# Patient Record
Sex: FEMALE | Race: WHITE | Employment: OTHER | URBAN - METROPOLITAN AREA
[De-identification: names, ages, dates, MRNs, and addresses within clinical notes are randomized per-mention and may not be internally consistent; named-entity substitution may affect disease eponyms.]

---

## 2017-07-12 ENCOUNTER — TELEPHONE (OUTPATIENT)
Dept: ORTHOPEDIC SURGERY | Age: 77
End: 2017-07-12

## 2017-07-12 ENCOUNTER — OFFICE VISIT (OUTPATIENT)
Dept: ORTHOPEDIC SURGERY | Age: 77
End: 2017-07-12

## 2017-07-12 VITALS
DIASTOLIC BLOOD PRESSURE: 63 MMHG | SYSTOLIC BLOOD PRESSURE: 109 MMHG | BODY MASS INDEX: 24.84 KG/M2 | HEIGHT: 62 IN | HEART RATE: 77 BPM | RESPIRATION RATE: 16 BRPM | WEIGHT: 135 LBS

## 2017-07-12 DIAGNOSIS — R22.31 MASS OF RIGHT FOREARM: ICD-10-CM

## 2017-07-12 PROCEDURE — 99203 OFFICE O/P NEW LOW 30 MIN: CPT | Performed by: ORTHOPAEDIC SURGERY

## 2017-07-12 PROCEDURE — 1090F PRES/ABSN URINE INCON ASSESS: CPT | Performed by: ORTHOPAEDIC SURGERY

## 2017-07-12 PROCEDURE — 4040F PNEUMOC VAC/ADMIN/RCVD: CPT | Performed by: ORTHOPAEDIC SURGERY

## 2017-07-12 PROCEDURE — G8420 CALC BMI NORM PARAMETERS: HCPCS | Performed by: ORTHOPAEDIC SURGERY

## 2017-07-12 PROCEDURE — G8427 DOCREV CUR MEDS BY ELIG CLIN: HCPCS | Performed by: ORTHOPAEDIC SURGERY

## 2017-07-12 PROCEDURE — 1036F TOBACCO NON-USER: CPT | Performed by: ORTHOPAEDIC SURGERY

## 2017-07-12 RX ORDER — OMEPRAZOLE 40 MG/1
CAPSULE, DELAYED RELEASE ORAL
COMMUNITY
Start: 2017-06-08 | End: 2018-09-06 | Stop reason: ALTCHOICE

## 2017-07-12 RX ORDER — PROPRANOLOL HYDROCHLORIDE 120 MG/1
CAPSULE, EXTENDED RELEASE ORAL
Status: ON HOLD | COMMUNITY
Start: 2017-06-01 | End: 2019-01-01

## 2017-07-25 ENCOUNTER — HOSPITAL ENCOUNTER (OUTPATIENT)
Dept: SURGERY | Age: 77
Discharge: HOME | End: 2017-07-24
Attending: ORTHOPAEDIC SURGERY | Admitting: ORTHOPAEDIC SURGERY

## 2017-08-04 ENCOUNTER — OFFICE VISIT (OUTPATIENT)
Dept: ENT CLINIC | Age: 77
End: 2017-08-04

## 2017-08-04 VITALS
BODY MASS INDEX: 25.25 KG/M2 | WEIGHT: 137.2 LBS | HEIGHT: 62 IN | DIASTOLIC BLOOD PRESSURE: 61 MMHG | SYSTOLIC BLOOD PRESSURE: 99 MMHG | HEART RATE: 94 BPM

## 2017-08-04 DIAGNOSIS — H69.83 EUSTACHIAN TUBE DYSFUNCTION, BILATERAL: ICD-10-CM

## 2017-08-04 DIAGNOSIS — H65.22 CHRONIC SEROUS OTITIS MEDIA, LEFT EAR: Primary | ICD-10-CM

## 2017-08-04 DIAGNOSIS — H91.93 BILATERAL HEARING LOSS, UNSPECIFIED HEARING LOSS TYPE: Chronic | ICD-10-CM

## 2017-08-04 PROCEDURE — 1123F ACP DISCUSS/DSCN MKR DOCD: CPT | Performed by: OTOLARYNGOLOGY

## 2017-08-04 PROCEDURE — 4040F PNEUMOC VAC/ADMIN/RCVD: CPT | Performed by: OTOLARYNGOLOGY

## 2017-08-04 PROCEDURE — 99203 OFFICE O/P NEW LOW 30 MIN: CPT | Performed by: OTOLARYNGOLOGY

## 2017-08-04 PROCEDURE — 1036F TOBACCO NON-USER: CPT | Performed by: OTOLARYNGOLOGY

## 2017-08-04 PROCEDURE — 1090F PRES/ABSN URINE INCON ASSESS: CPT | Performed by: OTOLARYNGOLOGY

## 2017-08-04 PROCEDURE — G8419 CALC BMI OUT NRM PARAM NOF/U: HCPCS | Performed by: OTOLARYNGOLOGY

## 2017-08-04 PROCEDURE — G8427 DOCREV CUR MEDS BY ELIG CLIN: HCPCS | Performed by: OTOLARYNGOLOGY

## 2017-08-04 PROCEDURE — G8400 PT W/DXA NO RESULTS DOC: HCPCS | Performed by: OTOLARYNGOLOGY

## 2017-08-04 RX ORDER — FLUTICASONE PROPIONATE 50 MCG
SPRAY, SUSPENSION (ML) NASAL
Qty: 1 BOTTLE | Refills: 2 | Status: SHIPPED | OUTPATIENT
Start: 2017-08-04 | End: 2018-09-06 | Stop reason: ALTCHOICE

## 2017-08-04 RX ORDER — METHYLPREDNISOLONE 4 MG/1
TABLET ORAL
Qty: 1 KIT | Refills: 0 | Status: SHIPPED | OUTPATIENT
Start: 2017-08-04 | End: 2017-08-10

## 2017-08-10 ENCOUNTER — OFFICE VISIT (OUTPATIENT)
Dept: ENT CLINIC | Age: 77
End: 2017-08-10

## 2017-08-10 DIAGNOSIS — H90.6 MIXED CONDUCTIVE AND SENSORINEURAL HEARING LOSS OF BOTH EARS: Primary | ICD-10-CM

## 2017-08-10 PROCEDURE — 1036F TOBACCO NON-USER: CPT | Performed by: AUDIOLOGIST

## 2017-08-10 PROCEDURE — 92557 COMPREHENSIVE HEARING TEST: CPT | Performed by: AUDIOLOGIST

## 2017-08-10 PROCEDURE — 92550 TYMPANOMETRY & REFLEX THRESH: CPT | Performed by: AUDIOLOGIST

## 2017-08-22 ENCOUNTER — OFFICE VISIT (OUTPATIENT)
Dept: ENT CLINIC | Age: 77
End: 2017-08-22

## 2017-08-22 VITALS
HEIGHT: 62 IN | BODY MASS INDEX: 25.47 KG/M2 | DIASTOLIC BLOOD PRESSURE: 83 MMHG | SYSTOLIC BLOOD PRESSURE: 139 MMHG | WEIGHT: 138.4 LBS | HEART RATE: 89 BPM

## 2017-08-22 DIAGNOSIS — H69.83 EUSTACHIAN TUBE DYSFUNCTION, BILATERAL: ICD-10-CM

## 2017-08-22 DIAGNOSIS — H90.6 MIXED CONDUCTIVE AND SENSORINEURAL HEARING LOSS OF BOTH EARS: Primary | ICD-10-CM

## 2017-08-22 DIAGNOSIS — H65.22 CHRONIC SEROUS OTITIS MEDIA, LEFT EAR: ICD-10-CM

## 2017-08-22 PROCEDURE — 69433 CREATE EARDRUM OPENING: CPT | Performed by: OTOLARYNGOLOGY

## 2017-08-22 PROCEDURE — 1036F TOBACCO NON-USER: CPT | Performed by: OTOLARYNGOLOGY

## 2017-08-22 RX ORDER — CIPROFLOXACIN AND DEXAMETHASONE 3; 1 MG/ML; MG/ML
4 SUSPENSION/ DROPS AURICULAR (OTIC) 2 TIMES DAILY
Qty: 1.5 ML | Refills: 0 | COMMUNITY
Start: 2017-08-22 | End: 2017-08-26

## 2017-11-09 ENCOUNTER — TELEPHONE (OUTPATIENT)
Dept: ENT CLINIC | Age: 77
End: 2017-11-09

## 2017-11-09 NOTE — TELEPHONE ENCOUNTER
Patient's daughter left a message on 11/7/17 requesting an appt for her. She said that both her ears are infected and she is in pain. Call back number is [phone]. Patient No Showed for appts on 9/27/17 and 10/24/17.

## 2017-11-10 NOTE — TELEPHONE ENCOUNTER
Patient has No Showed twice. 10/24/2017  09/27/2017    Last Refill: 8/4/2017    Faxed Pharmacy request back to [facility] with attached note that patient needs appointment.  Per Linda Dewitt

## 2017-11-11 RX ORDER — FLUTICASONE PROPIONATE 50 MCG
SPRAY, SUSPENSION (ML) NASAL
Qty: 1 BOTTLE | Refills: 2 | OUTPATIENT
Start: 2017-11-11

## 2017-11-11 NOTE — TELEPHONE ENCOUNTER
Since the fluticasone was prescribed for eustachian tube dysfunction and since a PE tube was inserted, she does not need this medication any longer for that indication. And since she has no-showed twice, she is no longer under my care until she makes a return appointment.

## 2017-11-13 ENCOUNTER — OFFICE VISIT (OUTPATIENT)
Dept: ENT CLINIC | Age: 77
End: 2017-11-13

## 2017-11-13 VITALS
HEART RATE: 86 BPM | SYSTOLIC BLOOD PRESSURE: 114 MMHG | WEIGHT: 141.4 LBS | DIASTOLIC BLOOD PRESSURE: 67 MMHG | HEIGHT: 62 IN | BODY MASS INDEX: 26.02 KG/M2

## 2017-11-13 DIAGNOSIS — H65.22 CHRONIC SEROUS OTITIS MEDIA, LEFT EAR: Chronic | ICD-10-CM

## 2017-11-13 DIAGNOSIS — H92.02 LEFT EAR PAIN: Primary | ICD-10-CM

## 2017-11-13 DIAGNOSIS — H90.6 MIXED CONDUCTIVE AND SENSORINEURAL HEARING LOSS OF BOTH EARS: ICD-10-CM

## 2017-11-13 DIAGNOSIS — H69.82 ETD (EUSTACHIAN TUBE DYSFUNCTION), LEFT: ICD-10-CM

## 2017-11-13 PROBLEM — H92.03 OTALGIA OF BOTH EARS: Status: ACTIVE | Noted: 2017-11-13

## 2017-11-13 PROCEDURE — 1123F ACP DISCUSS/DSCN MKR DOCD: CPT | Performed by: OTOLARYNGOLOGY

## 2017-11-13 PROCEDURE — G8427 DOCREV CUR MEDS BY ELIG CLIN: HCPCS | Performed by: OTOLARYNGOLOGY

## 2017-11-13 PROCEDURE — G8400 PT W/DXA NO RESULTS DOC: HCPCS | Performed by: OTOLARYNGOLOGY

## 2017-11-13 PROCEDURE — G8484 FLU IMMUNIZE NO ADMIN: HCPCS | Performed by: OTOLARYNGOLOGY

## 2017-11-13 PROCEDURE — 1090F PRES/ABSN URINE INCON ASSESS: CPT | Performed by: OTOLARYNGOLOGY

## 2017-11-13 PROCEDURE — 4040F PNEUMOC VAC/ADMIN/RCVD: CPT | Performed by: OTOLARYNGOLOGY

## 2017-11-13 PROCEDURE — 1036F TOBACCO NON-USER: CPT | Performed by: OTOLARYNGOLOGY

## 2017-11-13 PROCEDURE — 99214 OFFICE O/P EST MOD 30 MIN: CPT | Performed by: OTOLARYNGOLOGY

## 2017-11-13 PROCEDURE — G8417 CALC BMI ABV UP PARAM F/U: HCPCS | Performed by: OTOLARYNGOLOGY

## 2017-11-13 NOTE — PROGRESS NOTES
and all orders for this visit:    Left ear pain  Comments:  probable external otitis, resolving. Orders:  -     Internal Referral to Audiology at [facility]    Mixed conductive and sensorineural hearing loss of both ears  -     Internal Referral to Audiology at [facility]    Chronic serous otitis media, left ear  Comments:  Treated with PE tube. Orders:  -     Internal Referral to Audiology at [facility]    ETD (Eustachian tube dysfunction), left  Comments:  Treated with PE tubes. Orders:  -     Internal Referral to Audiology at [facility]         RECOMMENDATIONS / PLAN:    1. Audiogram.  The patient or her daughter will be called and informed of the results and whether FU appt is needed. 2. Right PE tube, will consider. 3. Return in about 6 months (around 5/13/2018) for recheck/follow-up, and sooner if condition worsens.

## 2017-11-20 ENCOUNTER — TELEPHONE (OUTPATIENT)
Dept: ENT CLINIC | Age: 77
End: 2017-11-20

## 2017-11-20 NOTE — TELEPHONE ENCOUNTER
Called to remind patient of appt for hearing test tomorrow. She cancelled the appt and does not want to reschedule at this time.

## 2018-01-01 ENCOUNTER — ANTI-COAG VISIT (OUTPATIENT)
Dept: PHARMACY | Age: 78
End: 2018-01-01
Payer: MEDICARE

## 2018-01-01 ENCOUNTER — TELEPHONE (OUTPATIENT)
Dept: PHARMACY | Age: 78
End: 2018-01-01

## 2018-01-01 DIAGNOSIS — I48.91 ATRIAL FIBRILLATION, UNSPECIFIED TYPE (HCC): ICD-10-CM

## 2018-01-01 LAB — INTERNATIONAL NORMALIZATION RATIO, POC: 2.3

## 2018-01-01 PROCEDURE — 85610 PROTHROMBIN TIME: CPT

## 2018-01-01 PROCEDURE — 99211 OFF/OP EST MAY X REQ PHY/QHP: CPT

## 2018-01-01 RX ORDER — WARFARIN SODIUM 4 MG/1
TABLET ORAL
Qty: 90 TABLET | Refills: 0 | Status: ON HOLD | OUTPATIENT
Start: 2018-01-01 | End: 2019-01-01

## 2018-03-14 ENCOUNTER — HOSPITAL ENCOUNTER (OUTPATIENT)
Dept: PHYSICAL THERAPY | Age: 78
Discharge: HOME | End: 2018-03-31
Admitting: INTERNAL MEDICINE

## 2018-04-01 ENCOUNTER — HOSPITAL ENCOUNTER (OUTPATIENT)
Dept: OTHER | Age: 78
Discharge: HOME | End: 2018-04-30
Attending: INTERNAL MEDICINE | Admitting: INTERNAL MEDICINE

## 2018-05-08 ENCOUNTER — HOSPITAL ENCOUNTER (OUTPATIENT)
Dept: MRI IMAGING | Age: 78
Discharge: HOME | End: 2018-05-08
Attending: INTERNAL MEDICINE | Admitting: INTERNAL MEDICINE

## 2018-05-08 DIAGNOSIS — D49.6 NEOPLASM OF BRAIN (HCC): ICD-10-CM

## 2018-05-08 DIAGNOSIS — D49.6 BRAIN NEOPLASM (HCC): ICD-10-CM

## 2018-08-17 NOTE — PROGRESS NOTES
[facility]   Electrophysiology Consultation   Date: 8/20/2018  Reason for Consultation: Atrial fibrillation   Consult Requesting Physician: Shayla Koenig    CC: Atrial fibrillation   HPI: Azar Gavin is a 69 y.o. female referred for new onset atrial fibrillation. She has PMH of HTN, COPD and dementia. EKG performed on 6/11/2018 showed atrial fibrillation with controlled ventricular rate. Pt arrives today with her daughter. She was at PCP office to have a pre operative physical prior to cataract surgery and was found to have abnormal EKG. Per pt, she was unaware that she was in an abnormal heart rhythm. Pt reports fatigue and SOB which she associates with COPD. She reports this has been progressive. She is on propranolol. CHADSVASC 4, she was recently started on coumadin for anticoagulation. EKG today shows atrial fibrillation. Pts daughter is concerned about discoloration of pts BLE, per pt she has pain in her legs at bedtime. Patient denies lightheadedness, dizziness, chest pain, palpitations, orthopnea, edema, presyncope or syncope. Per pt she gets SOB carrying her heavy cat or walking up and down stairs. Past Medical History:   Diagnosis Date    Arthritis     BACK    Asthma     Hypertension     Migraine         Past Surgical History:   Procedure Laterality Date    ECTOPIC PREGNANCY SURGERY      FOOT SURGERY Right 4/9/13    HAMMERTOE CORRECTION 2ND TOE RIGHT FOOT    KIDNEY STONE SURGERY      TUMOR REMOVAL      fatty  arm       Allergies   Allergen Reactions    Pcn [Penicillins] Hives     CAN TAKE KEFLEX    Procaine Hcl      Inc heart rate       Social History:   reports that she has quit smoking. She has never used smokeless tobacco. She reports that she does not drink alcohol. Family History:  family history includes Cancer in her father; Heart Disease in her mother. Review of System:  All other systems reviewed except for that noted above.  Pertinent negatives and

## 2018-08-20 ENCOUNTER — OFFICE VISIT (OUTPATIENT)
Dept: CARDIOLOGY CLINIC | Age: 78
End: 2018-08-20

## 2018-08-20 ENCOUNTER — TELEPHONE (OUTPATIENT)
Dept: CARDIOLOGY CLINIC | Age: 78
End: 2018-08-20

## 2018-08-20 VITALS
WEIGHT: 134.8 LBS | BODY MASS INDEX: 24.8 KG/M2 | SYSTOLIC BLOOD PRESSURE: 133 MMHG | HEART RATE: 92 BPM | DIASTOLIC BLOOD PRESSURE: 82 MMHG | HEIGHT: 62 IN

## 2018-08-20 DIAGNOSIS — F03.90 DEMENTIA WITHOUT BEHAVIORAL DISTURBANCE, UNSPECIFIED DEMENTIA TYPE: ICD-10-CM

## 2018-08-20 DIAGNOSIS — M79.605 PAIN IN BOTH LOWER EXTREMITIES: ICD-10-CM

## 2018-08-20 DIAGNOSIS — M79.604 PAIN IN BOTH LOWER EXTREMITIES: ICD-10-CM

## 2018-08-20 DIAGNOSIS — I48.91 ATRIAL FIBRILLATION, UNSPECIFIED TYPE (HCC): Primary | ICD-10-CM

## 2018-08-20 DIAGNOSIS — I73.9 CLAUDICATION OF BOTH LOWER EXTREMITIES (HCC): ICD-10-CM

## 2018-08-20 PROCEDURE — 99204 OFFICE O/P NEW MOD 45 MIN: CPT | Performed by: INTERNAL MEDICINE

## 2018-08-20 PROCEDURE — 1090F PRES/ABSN URINE INCON ASSESS: CPT | Performed by: INTERNAL MEDICINE

## 2018-08-20 PROCEDURE — G8400 PT W/DXA NO RESULTS DOC: HCPCS | Performed by: INTERNAL MEDICINE

## 2018-08-20 PROCEDURE — 1101F PT FALLS ASSESS-DOCD LE1/YR: CPT | Performed by: INTERNAL MEDICINE

## 2018-08-20 PROCEDURE — G8427 DOCREV CUR MEDS BY ELIG CLIN: HCPCS | Performed by: INTERNAL MEDICINE

## 2018-08-20 PROCEDURE — 4040F PNEUMOC VAC/ADMIN/RCVD: CPT | Performed by: INTERNAL MEDICINE

## 2018-08-20 PROCEDURE — G8420 CALC BMI NORM PARAMETERS: HCPCS | Performed by: INTERNAL MEDICINE

## 2018-08-20 PROCEDURE — 93000 ELECTROCARDIOGRAM COMPLETE: CPT | Performed by: INTERNAL MEDICINE

## 2018-08-20 PROCEDURE — 1123F ACP DISCUSS/DSCN MKR DOCD: CPT | Performed by: INTERNAL MEDICINE

## 2018-08-20 PROCEDURE — 1036F TOBACCO NON-USER: CPT | Performed by: INTERNAL MEDICINE

## 2018-08-20 RX ORDER — WARFARIN SODIUM 4 MG/1
4 TABLET ORAL
COMMUNITY
End: 2018-01-01 | Stop reason: SDUPTHER

## 2018-08-20 NOTE — LETTER
Yasmin 82  EP Procedure Sheet    [unfilled]    [facility] Ask  1940    EP Procedures     Pacemaker implant (single/dual)  EP Study    ICD implant (single/dual)  Atrial flutter ablation (AD Y/N)    Biv implant ICD  Cryoablation    Biv implant PPM  Atrial fibrillation ablation (AD Yes)    Generator Change (PPM/ICD/BiV)  SVT ablation    Lead revision (RV/LA/RA) (<1 month)  VT ablation      Lead extraction +/- upgrade (BiV/PPM/ICD)  VT Ischemic/ non-ischemic    Loop implant/ removal  VT RVOT   x Cardioversion  VT Left sided    AD  AVN ablation     Equipment     Medtronic   AURELIO Mapping System    [place]  [id number] [address] [employer]  CryoAblation    Biotronik  Laser Lead Extraction    Special Equipment       EP Procedures Scheduling Request    # hours Requested     Specific Day    Anesthesia    CT surgery backup    [facility]     Pre-Procedure Labs / Imaging     PT/INR  Type & cross    CBC  Units PRBC    BMP/Mg  Units FFP    Venogram  CXR    Echo  Cardiac CTA for Pulmonary vein mapping     RN INITIALS:ren  Patient Instructions  Do not eat or drink after midnight the night prior to procedure    Dx:atrial fib    You will be contacted by Julianne Gilliam or Hoang Chan in 7-10 business days to schedule your procedure

## 2018-08-21 NOTE — TELEPHONE ENCOUNTER
LVM for her to call me back. There was no discussion of AICD, RMM discussed cardioversion procedure. If cardioversion is not successful ablation can be considered.

## 2018-08-21 NOTE — TELEPHONE ENCOUNTER
RN spoke with both the daughter, Ms. Elsa Cano and the patient. Explained the reason behind the DCCV and what the next tx steps would potentially be if the DCCV failed. Also reviewed when an AICD is placed. Patient and daughter grateful foe the call.

## 2018-08-24 ENCOUNTER — TELEPHONE (OUTPATIENT)
Dept: CARDIOLOGY CLINIC | Age: 78
End: 2018-08-24

## 2018-08-24 ENCOUNTER — TELEPHONE (OUTPATIENT)
Dept: PHARMACY | Age: 78
End: 2018-08-24

## 2018-08-24 DIAGNOSIS — I48.91 ATRIAL FIBRILLATION, UNSPECIFIED TYPE (HCC): Primary | ICD-10-CM

## 2018-08-24 DIAGNOSIS — I73.9 PERIPHERAL VASCULAR DISEASE, UNSPECIFIED (HCC): Primary | ICD-10-CM

## 2018-08-24 NOTE — TELEPHONE ENCOUNTER
Got a call from scheduling the original order for the venous test was incorrect, was given the correct order and it was placed.

## 2018-08-24 NOTE — TELEPHONE ENCOUNTER
Delores Sheppard from the coumadin clinic calling to ask RMM what to do about this patients INR. She has been on coumadin for about 3 weeks (per daughter ) and has not had INR yet . Coumadin clinic can not get her in next week for an INR and wants to know if RMM wants her scheduled the following week or if he wants her to go to [facility] lab for INR. Please call Delores Sheppard and let her know when to schedule patient then call patients daughter Linh Bird to let her know when patient needs to come in .

## 2018-08-28 ENCOUNTER — HOSPITAL ENCOUNTER (OUTPATIENT)
Dept: VASCULAR LAB | Age: 78
Discharge: HOME | End: 2018-08-29
Attending: INTERNAL MEDICINE | Admitting: INTERNAL MEDICINE

## 2018-08-28 DIAGNOSIS — I48.91 ATRIAL FIBRILLATION (HCC): ICD-10-CM

## 2018-09-01 ENCOUNTER — HOSPITAL ENCOUNTER (OUTPATIENT)
Dept: OTHER | Age: 78
Discharge: HOME | End: 2018-09-01
Attending: INTERNAL MEDICINE | Admitting: INTERNAL MEDICINE

## 2018-09-05 ENCOUNTER — HOSPITAL ENCOUNTER (OUTPATIENT)
Dept: OTHER | Age: 78
Discharge: HOME | End: 2018-09-05
Attending: INTERNAL MEDICINE | Admitting: INTERNAL MEDICINE

## 2018-09-05 DIAGNOSIS — I48.91 ATRIAL FIBRILLATION, UNSPECIFIED TYPE (HCC): ICD-10-CM

## 2018-09-05 LAB
INR BLD: 2.52 (ref 0.86–1.14)
PROTHROMBIN TIME: 28.7 SEC (ref 9.8–13)
TSH REFLEX: 2.55 UIU/ML (ref 0.27–4.2)

## 2018-09-06 ENCOUNTER — ANTI-COAG VISIT (OUTPATIENT)
Dept: PHARMACY | Age: 78
End: 2018-09-06

## 2018-09-06 ENCOUNTER — HOSPITAL ENCOUNTER (OUTPATIENT)
Dept: NON INVASIVE DIAGNOSTICS | Age: 78
Discharge: HOME | End: 2018-09-06
Attending: INTERNAL MEDICINE | Admitting: INTERNAL MEDICINE

## 2018-09-06 DIAGNOSIS — I48.91 ATRIAL FIBRILLATION (HCC): ICD-10-CM

## 2018-09-06 DIAGNOSIS — I48.91 ATRIAL FIBRILLATION, UNSPECIFIED TYPE (HCC): ICD-10-CM

## 2018-09-06 LAB
INR BLD: 2.4
LEFT VENTRICULAR EJECTION FRACTION HIGH VALUE: 55 %
LEFT VENTRICULAR EJECTION FRACTION MODE: NORMAL
LV EF: 55 %
LVEF MODALITY: NORMAL
PROTIME: 29.2 SECONDS

## 2018-09-06 RX ORDER — OXYCODONE HYDROCHLORIDE 15 MG/1
15 TABLET ORAL 3 TIMES DAILY
Status: ON HOLD | COMMUNITY
End: 2019-01-01 | Stop reason: HOSPADM

## 2018-09-06 RX ORDER — PROPRANOLOL HCL 60 MG
60 CAPSULE, EXTENDED RELEASE 24HR ORAL EVERY MORNING
Status: ON HOLD | COMMUNITY
End: 2019-01-01 | Stop reason: HOSPADM

## 2018-09-25 ENCOUNTER — OFFICE VISIT (OUTPATIENT)
Dept: CARDIOLOGY CLINIC | Age: 78
End: 2018-09-25
Payer: MEDICARE

## 2018-09-25 ENCOUNTER — ANTI-COAG VISIT (OUTPATIENT)
Dept: PHARMACY | Age: 78
End: 2018-09-25
Payer: MEDICARE

## 2018-09-25 VITALS
HEART RATE: 75 BPM | HEIGHT: 62 IN | SYSTOLIC BLOOD PRESSURE: 118 MMHG | BODY MASS INDEX: 25.54 KG/M2 | DIASTOLIC BLOOD PRESSURE: 70 MMHG | WEIGHT: 138.8 LBS

## 2018-09-25 DIAGNOSIS — I48.91 ATRIAL FIBRILLATION, UNSPECIFIED TYPE (HCC): Primary | ICD-10-CM

## 2018-09-25 DIAGNOSIS — I48.91 ATRIAL FIBRILLATION, UNSPECIFIED TYPE (HCC): ICD-10-CM

## 2018-09-25 DIAGNOSIS — I10 ESSENTIAL HYPERTENSION: ICD-10-CM

## 2018-09-25 LAB — INTERNATIONAL NORMALIZATION RATIO, POC: 2.3

## 2018-09-25 PROCEDURE — 99211 OFF/OP EST MAY X REQ PHY/QHP: CPT

## 2018-09-25 PROCEDURE — 1123F ACP DISCUSS/DSCN MKR DOCD: CPT | Performed by: NURSE PRACTITIONER

## 2018-09-25 PROCEDURE — 93000 ELECTROCARDIOGRAM COMPLETE: CPT | Performed by: NURSE PRACTITIONER

## 2018-09-25 PROCEDURE — 4040F PNEUMOC VAC/ADMIN/RCVD: CPT | Performed by: NURSE PRACTITIONER

## 2018-09-25 PROCEDURE — 85610 PROTHROMBIN TIME: CPT

## 2018-09-25 PROCEDURE — 99214 OFFICE O/P EST MOD 30 MIN: CPT | Performed by: NURSE PRACTITIONER

## 2018-09-25 PROCEDURE — 1101F PT FALLS ASSESS-DOCD LE1/YR: CPT | Performed by: NURSE PRACTITIONER

## 2018-09-25 PROCEDURE — G8417 CALC BMI ABV UP PARAM F/U: HCPCS | Performed by: NURSE PRACTITIONER

## 2018-09-25 PROCEDURE — 1090F PRES/ABSN URINE INCON ASSESS: CPT | Performed by: NURSE PRACTITIONER

## 2018-09-25 PROCEDURE — G8400 PT W/DXA NO RESULTS DOC: HCPCS | Performed by: NURSE PRACTITIONER

## 2018-09-25 PROCEDURE — 1036F TOBACCO NON-USER: CPT | Performed by: NURSE PRACTITIONER

## 2018-09-25 PROCEDURE — G8427 DOCREV CUR MEDS BY ELIG CLIN: HCPCS | Performed by: NURSE PRACTITIONER

## 2018-10-07 PROBLEM — I10 ESSENTIAL HYPERTENSION: Status: ACTIVE | Noted: 2018-10-07

## 2018-10-23 ENCOUNTER — TELEPHONE (OUTPATIENT)
Dept: CARDIOLOGY CLINIC | Age: 78
End: 2018-10-23

## 2018-10-23 NOTE — TELEPHONE ENCOUNTER
Needs to have endoscopy and colonoscopy and needs to be off her coumadin for 5 days , is that ok . She is having bleeding and needs these test to figure out where she is bleeding . pls call daughter .
She can proceed with colonoscopy. She can stop warfarin 4 days prior to procedure.
work, treatment, procedures, and medical decision making performed by me. NOTE: This report was transcribed using voice recognition software. Every effort was made to ensure accuracy, however, inadvertent computerized transcription errors may be present.      Suni Russo MD, MPH  [place] [address]   Office: [phone]

## 2018-10-24 ENCOUNTER — TELEPHONE (OUTPATIENT)
Dept: PHARMACY | Age: 78
End: 2018-10-24

## 2018-10-25 ENCOUNTER — TELEPHONE (OUTPATIENT)
Dept: CARDIOLOGY CLINIC | Age: 78
End: 2018-10-25

## 2018-10-25 NOTE — TELEPHONE ENCOUNTER
Pts daughter left v/m asking to cancel CC appt today , she would like to r/s tomorrow. Called Leela Sosa ( daughter ) back left v/m with available appts we had and to call us back to r/s.

## 2018-10-30 ENCOUNTER — ANTI-COAG VISIT (OUTPATIENT)
Dept: PHARMACY | Age: 78
End: 2018-10-30
Payer: MEDICARE

## 2018-10-30 DIAGNOSIS — I48.91 ATRIAL FIBRILLATION, UNSPECIFIED TYPE (HCC): ICD-10-CM

## 2018-10-30 LAB — INTERNATIONAL NORMALIZATION RATIO, POC: 3.8

## 2018-10-30 PROCEDURE — 99211 OFF/OP EST MAY X REQ PHY/QHP: CPT

## 2018-10-30 PROCEDURE — 85610 PROTHROMBIN TIME: CPT

## 2018-10-30 NOTE — PROGRESS NOTES
Ms. Enedina Canales is a 69 y.o. y/o female with history of Afib   She presents today for anticoagulation monitoring and adjustment. Pertinent PMH: COPD    Patient Reported Findings:  Yes     No  [x]   []       Patient verifies current dosing regimen as listed  []   [x]       S/S bleeding/bruising/swelling/SOB  []   [x]       Blood in urine or stool  []   [x]       Procedures scheduled in the future at this time Anticipates an upper and lower GI but not scheduled yet. []   [x]       Missed Dose  [x]   []       Extra Dose Probable extra dose of warfarin recently  []   [x]       Change in medications   []   [x]       Change in health/diet/appetite Does not eat many vegetables. Daughter describes patient's diet as poor.  []   [x]       Change in alcohol use  []   [x]       Change in activity  []   [x]       Hospital admission  []   [x]       Emergency department visit  []   [x]       Other complaints  Clinical Outcomes:  Yes     No  []   [x]       Major bleeding event  []   [x]       Thromboembolic event    Duration of warfarin Therapy: indefinitely  INR Range:  2.0-3.0     Patient has some dementia so daughter must accompany her to help with Hx. INR is 3.8 today  Hold dose tomorrow only then continue same weekly dose of 4mg daily. Encouraged to maintain a consistency of vegetables/salads.   Recheck INR in 2 weeks on 11/13    Referring cardiologist is Dr. Erlinda Marquez  INR (no units)   Date Value   10/30/2018 3.8   09/25/2018 2.3   09/20/2018 2.60 (H)   09/06/2018 2.4   09/05/2018 2.52 (H)

## 2018-11-05 ENCOUNTER — TELEPHONE (OUTPATIENT)
Dept: CARDIOLOGY CLINIC | Age: 78
End: 2018-11-05

## 2018-11-05 NOTE — LETTER
[address] [place] [address] Nel Saenz  [address] [address]  Phone: [phone]  Fax: [phone]    Suni Russo MD        2018    Chuckadelso Olivarezliliana  [address] [facility]  [address] [id number]    1940  MRN # [id number]         Chuckadelso Gloria may proceed with the scheduled colonoscopy and may hold the warfarin five days. If you have any questions or concerns, please don't hesitate to call.     Sincerely,        Suni Russo MD

## 2018-11-20 NOTE — TELEPHONE ENCOUNTER
Copy of No Show letter sent to Esperanza Allen MD @  [address] [place] [address] [address], . [address]

## 2019-01-01 ENCOUNTER — ANESTHESIA EVENT (OUTPATIENT)
Dept: OPERATING ROOM | Age: 79
DRG: 004 | End: 2019-01-01
Payer: MEDICARE

## 2019-01-01 ENCOUNTER — APPOINTMENT (OUTPATIENT)
Dept: GENERAL RADIOLOGY | Age: 79
DRG: 004 | End: 2019-01-01
Payer: MEDICARE

## 2019-01-01 ENCOUNTER — APPOINTMENT (OUTPATIENT)
Dept: CT IMAGING | Age: 79
DRG: 004 | End: 2019-01-01
Payer: MEDICARE

## 2019-01-01 ENCOUNTER — APPOINTMENT (OUTPATIENT)
Dept: PHARMACY | Age: 79
End: 2019-01-01
Payer: MEDICARE

## 2019-01-01 ENCOUNTER — TELEPHONE (OUTPATIENT)
Dept: PHARMACY | Age: 79
End: 2019-01-01

## 2019-01-01 ENCOUNTER — ANESTHESIA EVENT (OUTPATIENT)
Dept: ENDOSCOPY | Age: 79
DRG: 004 | End: 2019-01-01
Payer: MEDICARE

## 2019-01-01 ENCOUNTER — ANESTHESIA (OUTPATIENT)
Dept: OPERATING ROOM | Age: 79
DRG: 004 | End: 2019-01-01
Payer: MEDICARE

## 2019-01-01 ENCOUNTER — OFFICE VISIT (OUTPATIENT)
Dept: CARDIOLOGY CLINIC | Age: 79
End: 2019-01-01
Payer: MEDICARE

## 2019-01-01 ENCOUNTER — APPOINTMENT (OUTPATIENT)
Dept: CT IMAGING | Age: 79
End: 2019-01-01
Payer: MEDICARE

## 2019-01-01 ENCOUNTER — APPOINTMENT (OUTPATIENT)
Dept: GENERAL RADIOLOGY | Age: 79
End: 2019-01-01
Payer: MEDICARE

## 2019-01-01 ENCOUNTER — ANTI-COAG VISIT (OUTPATIENT)
Dept: PHARMACY | Age: 79
End: 2019-01-01
Payer: MEDICARE

## 2019-01-01 ENCOUNTER — TELEPHONE (OUTPATIENT)
Dept: SURGERY | Age: 79
End: 2019-01-01

## 2019-01-01 ENCOUNTER — CARE COORDINATION (OUTPATIENT)
Dept: CASE MANAGEMENT | Age: 79
End: 2019-01-01

## 2019-01-01 ENCOUNTER — ANTI-COAG VISIT (OUTPATIENT)
Dept: PHARMACY | Age: 79
End: 2019-01-01

## 2019-01-01 ENCOUNTER — ANESTHESIA (OUTPATIENT)
Dept: ENDOSCOPY | Age: 79
DRG: 004 | End: 2019-01-01
Payer: MEDICARE

## 2019-01-01 ENCOUNTER — HOSPITAL ENCOUNTER (EMERGENCY)
Age: 79
Discharge: HOME | End: 2019-06-09
Attending: EMERGENCY MEDICINE
Payer: MEDICARE

## 2019-01-01 ENCOUNTER — APPOINTMENT (OUTPATIENT)
Dept: INTERVENTIONAL RADIOLOGY/VASCULAR | Age: 79
DRG: 004 | End: 2019-01-01
Payer: MEDICARE

## 2019-01-01 ENCOUNTER — HOSPITAL ENCOUNTER (INPATIENT)
Age: 79
LOS: 6 days | Discharge: HOSPICE | DRG: 309 | End: 2019-10-25
Attending: EMERGENCY MEDICINE | Admitting: INTERNAL MEDICINE
Payer: MEDICARE

## 2019-01-01 ENCOUNTER — APPOINTMENT (OUTPATIENT)
Dept: GENERAL RADIOLOGY | Age: 79
DRG: 309 | End: 2019-01-01
Payer: MEDICARE

## 2019-01-01 ENCOUNTER — TELEPHONE (OUTPATIENT)
Dept: CARDIOLOGY CLINIC | Age: 79
End: 2019-01-01

## 2019-01-01 ENCOUNTER — HOSPITAL ENCOUNTER (INPATIENT)
Age: 79
LOS: 22 days | Discharge: NURSING, REHAB OR LONG-TERM CARE | DRG: 004 | End: 2019-09-21
Attending: EMERGENCY MEDICINE | Admitting: HOSPITALIST
Payer: MEDICARE

## 2019-01-01 VITALS
SYSTOLIC BLOOD PRESSURE: 100 MMHG | DIASTOLIC BLOOD PRESSURE: 69 MMHG | OXYGEN SATURATION: 99 % | RESPIRATION RATE: 17 BRPM

## 2019-01-01 VITALS
RESPIRATION RATE: 24 BRPM | SYSTOLIC BLOOD PRESSURE: 161 MMHG | OXYGEN SATURATION: 100 % | DIASTOLIC BLOOD PRESSURE: 80 MMHG

## 2019-01-01 VITALS
RESPIRATION RATE: 13 BRPM | HEIGHT: 62 IN | TEMPERATURE: 98.3 F | OXYGEN SATURATION: 99 % | SYSTOLIC BLOOD PRESSURE: 148 MMHG | HEART RATE: 107 BPM | BODY MASS INDEX: 23.21 KG/M2 | WEIGHT: 126.1 LBS | DIASTOLIC BLOOD PRESSURE: 89 MMHG

## 2019-01-01 VITALS
TEMPERATURE: 97.1 F | DIASTOLIC BLOOD PRESSURE: 74 MMHG | SYSTOLIC BLOOD PRESSURE: 137 MMHG | HEART RATE: 73 BPM | HEIGHT: 62 IN | BODY MASS INDEX: 24.84 KG/M2 | RESPIRATION RATE: 16 BRPM | WEIGHT: 135 LBS | OXYGEN SATURATION: 95 %

## 2019-01-01 VITALS
HEIGHT: 62 IN | TEMPERATURE: 98.6 F | WEIGHT: 136.4 LBS | RESPIRATION RATE: 18 BRPM | HEART RATE: 80 BPM | OXYGEN SATURATION: 100 % | BODY MASS INDEX: 25.1 KG/M2 | DIASTOLIC BLOOD PRESSURE: 67 MMHG | SYSTOLIC BLOOD PRESSURE: 126 MMHG

## 2019-01-01 VITALS
DIASTOLIC BLOOD PRESSURE: 80 MMHG | SYSTOLIC BLOOD PRESSURE: 126 MMHG | BODY MASS INDEX: 26.31 KG/M2 | HEIGHT: 62 IN | HEART RATE: 83 BPM | RESPIRATION RATE: 18 BRPM | WEIGHT: 143 LBS

## 2019-01-01 DIAGNOSIS — I48.91 ATRIAL FIBRILLATION WITH RAPID VENTRICULAR RESPONSE (HCC): Primary | ICD-10-CM

## 2019-01-01 DIAGNOSIS — J81.0 ACUTE PULMONARY EDEMA (HCC): ICD-10-CM

## 2019-01-01 DIAGNOSIS — K83.8 COMMON BILE DUCT DILATATION: ICD-10-CM

## 2019-01-01 DIAGNOSIS — Z93.0 TRACHEOSTOMY PRESENT (HCC): ICD-10-CM

## 2019-01-01 DIAGNOSIS — I48.91 ATRIAL FIBRILLATION WITH RAPID VENTRICULAR RESPONSE (HCC): ICD-10-CM

## 2019-01-01 DIAGNOSIS — I48.0 PAROXYSMAL ATRIAL FIBRILLATION (HCC): Primary | ICD-10-CM
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
## 2019-01-01 DIAGNOSIS — J80 ARDS (ADULT RESPIRATORY DISTRESS SYNDROME) (HCC): ICD-10-CM

## 2019-01-01 DIAGNOSIS — K80.00 CALCULUS OF GALLBLADDER WITH ACUTE CHOLECYSTITIS WITHOUT OBSTRUCTION: ICD-10-CM

## 2019-01-01 DIAGNOSIS — I48.91 ATRIAL FIBRILLATION, UNSPECIFIED TYPE (HCC): ICD-10-CM

## 2019-01-01 DIAGNOSIS — R10.0 ACUTE ABDOMEN: ICD-10-CM

## 2019-01-01 DIAGNOSIS — R45.1 AGITATION: ICD-10-CM

## 2019-01-01 DIAGNOSIS — I48.0 PAROXYSMAL ATRIAL FIBRILLATION (HCC): ICD-10-CM

## 2019-01-01 DIAGNOSIS — J96.01 ACUTE RESPIRATORY FAILURE WITH HYPOXIA (HCC): ICD-10-CM

## 2019-01-01 DIAGNOSIS — R10.11 RIGHT UPPER QUADRANT ABDOMINAL PAIN: Primary | ICD-10-CM

## 2019-01-01 DIAGNOSIS — I10 ESSENTIAL HYPERTENSION: ICD-10-CM

## 2019-01-01 DIAGNOSIS — S50.01XA CONTUSION OF RIGHT ELBOW, INITIAL ENCOUNTER: ICD-10-CM

## 2019-01-01 LAB
A/G RATIO: 0.6 (ref 1.1–2.2)
A/G RATIO: 0.7 (ref 1.1–2.2)
A/G RATIO: 0.8 (ref 1.1–2.2)
A/G RATIO: 0.9 (ref 1.1–2.2)
A/G RATIO: 1 (ref 1.1–2.2)
A/G RATIO: 1.1 (ref 1.1–2.2)
A/G RATIO: 1.2 (ref 1.1–2.2)
A/G RATIO: 1.3 (ref 1.1–2.2)
A/G RATIO: 1.4 (ref 1.1–2.2)
ALBUMIN SERPL-MCNC: 2 G/DL (ref 3.4–5)
ALBUMIN SERPL-MCNC: 2.1 G/DL (ref 3.4–5)
ALBUMIN SERPL-MCNC: 2.2 G/DL (ref 3.4–5)
ALBUMIN SERPL-MCNC: 2.3 G/DL (ref 3.4–5)
ALBUMIN SERPL-MCNC: 2.4 G/DL (ref 3.4–5)
ALBUMIN SERPL-MCNC: 2.5 G/DL (ref 3.4–5)
ALBUMIN SERPL-MCNC: 2.5 G/DL (ref 3.4–5)
ALBUMIN SERPL-MCNC: 2.6 G/DL (ref 3.4–5)
ALBUMIN SERPL-MCNC: 2.8 G/DL (ref 3.4–5)
ALBUMIN SERPL-MCNC: 2.9 G/DL (ref 3.4–5)
ALBUMIN SERPL-MCNC: 2.9 G/DL (ref 3.4–5)
ALBUMIN SERPL-MCNC: 3 G/DL (ref 3.4–5)
ALBUMIN SERPL-MCNC: 3.1 G/DL (ref 3.4–5)
ALBUMIN SERPL-MCNC: 3.2 G/DL (ref 3.4–5)
ALBUMIN SERPL-MCNC: 3.2 G/DL (ref 3.4–5)
ALBUMIN SERPL-MCNC: 3.4 G/DL (ref 3.4–5)
ALBUMIN SERPL-MCNC: 4.4 G/DL (ref 3.4–5)
ALP BLD-CCNC: 105 U/L (ref 40–129)
ALP BLD-CCNC: 122 U/L (ref 40–129)
ALP BLD-CCNC: 124 U/L (ref 40–129)
ALP BLD-CCNC: 126 U/L (ref 40–129)
ALP BLD-CCNC: 128 U/L (ref 40–129)
ALP BLD-CCNC: 138 U/L (ref 40–129)
ALP BLD-CCNC: 145 U/L (ref 40–129)
ALP BLD-CCNC: 160 U/L (ref 40–129)
ALP BLD-CCNC: 162 U/L (ref 40–129)
ALP BLD-CCNC: 173 U/L (ref 40–129)
ALP BLD-CCNC: 178 U/L (ref 40–129)
ALP BLD-CCNC: 178 U/L (ref 40–129)
ALP BLD-CCNC: 182 U/L (ref 40–129)
ALP BLD-CCNC: 190 U/L (ref 40–129)
ALP BLD-CCNC: 197 U/L (ref 40–129)
ALP BLD-CCNC: 201 U/L (ref 40–129)
ALP BLD-CCNC: 204 U/L (ref 40–129)
ALP BLD-CCNC: 218 U/L (ref 40–129)
ALP BLD-CCNC: 219 U/L (ref 40–129)
ALP BLD-CCNC: 219 U/L (ref 40–129)
ALP BLD-CCNC: 224 U/L (ref 40–129)
ALP BLD-CCNC: 229 U/L (ref 40–129)
ALP BLD-CCNC: 245 U/L (ref 40–129)
ALP BLD-CCNC: 315 U/L (ref 40–129)
ALT SERPL-CCNC: 103 U/L (ref 10–40)
ALT SERPL-CCNC: 14 U/L (ref 10–40)
ALT SERPL-CCNC: 14 U/L (ref 10–40)
ALT SERPL-CCNC: 16 U/L (ref 10–40)
ALT SERPL-CCNC: 173 U/L (ref 10–40)
ALT SERPL-CCNC: 18 U/L (ref 10–40)
ALT SERPL-CCNC: 19 U/L (ref 10–40)
ALT SERPL-CCNC: 19 U/L (ref 10–40)
ALT SERPL-CCNC: 25 U/L (ref 10–40)
ALT SERPL-CCNC: 255 U/L (ref 10–40)
ALT SERPL-CCNC: 29 U/L (ref 10–40)
ALT SERPL-CCNC: 33 U/L (ref 10–40)
ALT SERPL-CCNC: 44 U/L (ref 10–40)
ALT SERPL-CCNC: 46 U/L (ref 10–40)
ALT SERPL-CCNC: 53 U/L (ref 10–40)
ALT SERPL-CCNC: 53 U/L (ref 10–40)
ALT SERPL-CCNC: 57 U/L (ref 10–40)
ALT SERPL-CCNC: 58 U/L (ref 10–40)
ALT SERPL-CCNC: 60 U/L (ref 10–40)
ALT SERPL-CCNC: 67 U/L (ref 10–40)
ALT SERPL-CCNC: 68 U/L (ref 10–40)
ALT SERPL-CCNC: 72 U/L (ref 10–40)
ALT SERPL-CCNC: 79 U/L (ref 10–40)
ALT SERPL-CCNC: 81 U/L (ref 10–40)
ANAEROBIC CULTURE: ABNORMAL
ANION GAP SERPL CALCULATED.3IONS-SCNC: 10 MMOL/L (ref 3–16)
ANION GAP SERPL CALCULATED.3IONS-SCNC: 11 MMOL/L (ref 3–16)
ANION GAP SERPL CALCULATED.3IONS-SCNC: 12 MMOL/L (ref 3–16)
ANION GAP SERPL CALCULATED.3IONS-SCNC: 13 MMOL/L (ref 3–16)
ANION GAP SERPL CALCULATED.3IONS-SCNC: 13 MMOL/L (ref 3–16)
ANION GAP SERPL CALCULATED.3IONS-SCNC: 14 MMOL/L (ref 3–16)
ANION GAP SERPL CALCULATED.3IONS-SCNC: 6 MMOL/L (ref 3–16)
ANION GAP SERPL CALCULATED.3IONS-SCNC: 7 MMOL/L (ref 3–16)
ANION GAP SERPL CALCULATED.3IONS-SCNC: 7 MMOL/L (ref 3–16)
ANION GAP SERPL CALCULATED.3IONS-SCNC: 8 MMOL/L (ref 3–16)
ANION GAP SERPL CALCULATED.3IONS-SCNC: 9 MMOL/L (ref 3–16)
ANISOCYTOSIS: ABNORMAL
APPEARANCE CSF: CLEAR
APPEARANCE CSF: CLEAR
APTT: 115.4 SEC (ref 26–36)
APTT: 131.2 SEC (ref 26–36)
APTT: 22.1 SEC (ref 26–36)
APTT: 22.3 SEC (ref 26–36)
APTT: 24.4 SEC (ref 26–36)
APTT: 24.7 SEC (ref 26–36)
APTT: 241.4 SEC (ref 26–36)
APTT: 26.4 SEC (ref 26–36)
APTT: 26.6 SEC (ref 26–36)
APTT: 28.3 SEC (ref 26–36)
APTT: 28.4 SEC (ref 26–36)
APTT: 31 SEC (ref 26–36)
APTT: 32 SEC (ref 26–36)
APTT: 34.2 SEC (ref 26–36)
APTT: 34.3 SEC (ref 26–36)
APTT: 35.4 SEC (ref 26–36)
APTT: 37.5 SEC (ref 26–36)
APTT: 39.4 SEC (ref 26–36)
APTT: 40.1 SEC (ref 26–36)
APTT: 40.8 SEC (ref 26–36)
APTT: 47.4 SEC (ref 26–36)
APTT: 48.3 SEC (ref 26–36)
APTT: 49.3 SEC (ref 26–36)
APTT: 50.7 SEC (ref 26–36)
APTT: 51.3 SEC (ref 26–36)
APTT: 55.4 SEC (ref 26–36)
APTT: 56.4 SEC (ref 26–36)
APTT: 57.7 SEC (ref 26–36)
APTT: 58.2 SEC (ref 26–36)
APTT: 58.3 SEC (ref 26–36)
APTT: 66.7 SEC (ref 26–36)
APTT: 69.7 SEC (ref 26–36)
APTT: 72.4 SEC (ref 26–36)
APTT: 73.2 SEC (ref 26–36)
APTT: 74.7 SEC (ref 26–36)
APTT: 75.8 SEC (ref 26–36)
APTT: 78.2 SEC (ref 26–36)
APTT: 86.6 SEC (ref 26–36)
APTT: 92 SEC (ref 26–36)
AST SERPL-CCNC: 118 U/L (ref 15–37)
AST SERPL-CCNC: 21 U/L (ref 15–37)
AST SERPL-CCNC: 21 U/L (ref 15–37)
AST SERPL-CCNC: 211 U/L (ref 15–37)
AST SERPL-CCNC: 22 U/L (ref 15–37)
AST SERPL-CCNC: 23 U/L (ref 15–37)
AST SERPL-CCNC: 24 U/L (ref 15–37)
AST SERPL-CCNC: 25 U/L (ref 15–37)
AST SERPL-CCNC: 27 U/L (ref 15–37)
AST SERPL-CCNC: 32 U/L (ref 15–37)
AST SERPL-CCNC: 35 U/L (ref 15–37)
AST SERPL-CCNC: 40 U/L (ref 15–37)
AST SERPL-CCNC: 41 U/L (ref 15–37)
AST SERPL-CCNC: 42 U/L (ref 15–37)
AST SERPL-CCNC: 48 U/L (ref 15–37)
AST SERPL-CCNC: 50 U/L (ref 15–37)
AST SERPL-CCNC: 50 U/L (ref 15–37)
AST SERPL-CCNC: 53 U/L (ref 15–37)
AST SERPL-CCNC: 549 U/L (ref 15–37)
AST SERPL-CCNC: 58 U/L (ref 15–37)
AST SERPL-CCNC: 63 U/L (ref 15–37)
AST SERPL-CCNC: 70 U/L (ref 15–37)
AST SERPL-CCNC: 78 U/L (ref 15–37)
AST SERPL-CCNC: 89 U/L (ref 15–37)
ATYPICAL LYMPHOCYTE RELATIVE PERCENT: 1 % (ref 0–6)
BANDED NEUTROPHILS RELATIVE PERCENT: 1 % (ref 0–7)
BANDED NEUTROPHILS RELATIVE PERCENT: 2 % (ref 0–7)
BANDED NEUTROPHILS RELATIVE PERCENT: 22 % (ref 0–7)
BANDED NEUTROPHILS RELATIVE PERCENT: 3 % (ref 0–7)
BASE EXCESS ARTERIAL: -1 (ref -3–3)
BASE EXCESS ARTERIAL: -5.1 MMOL/L (ref -3–3)
BASE EXCESS ARTERIAL: -5.6 MMOL/L (ref -3–3)
BASE EXCESS ARTERIAL: -6.3 MMOL/L (ref -3–3)
BASE EXCESS ARTERIAL: -6.9 MMOL/L (ref -3–3)
BASE EXCESS ARTERIAL: -7.7 MMOL/L (ref -3–3)
BASE EXCESS ARTERIAL: -8.3 MMOL/L (ref -3–3)
BASE EXCESS ARTERIAL: 0.7 MMOL/L (ref -3–3)
BASE EXCESS ARTERIAL: 2 MMOL/L (ref -3–3)
BASOPHILIC STIPPLING: ABNORMAL
BASOPHILS ABSOLUTE: 0 K/UL (ref 0–0.2)
BASOPHILS ABSOLUTE: 0.1 K/UL (ref 0–0.2)
BASOPHILS RELATIVE PERCENT: 0 %
BASOPHILS RELATIVE PERCENT: 0.1 %
BASOPHILS RELATIVE PERCENT: 0.2 %
BASOPHILS RELATIVE PERCENT: 0.2 %
BASOPHILS RELATIVE PERCENT: 0.3 %
BASOPHILS RELATIVE PERCENT: 0.5 %
BASOPHILS RELATIVE PERCENT: 0.7 %
BASOPHILS RELATIVE PERCENT: 0.8 %
BASOPHILS RELATIVE PERCENT: 0.9 %
BASOPHILS RELATIVE PERCENT: 0.9 %
BASOPHILS RELATIVE PERCENT: 1.1 %
BILIRUB SERPL-MCNC: 0.4 MG/DL (ref 0–1)
BILIRUB SERPL-MCNC: 0.5 MG/DL (ref 0–1)
BILIRUB SERPL-MCNC: 0.5 MG/DL (ref 0–1)
BILIRUB SERPL-MCNC: 0.6 MG/DL (ref 0–1)
BILIRUB SERPL-MCNC: 0.7 MG/DL (ref 0–1)
BILIRUB SERPL-MCNC: 0.8 MG/DL (ref 0–1)
BILIRUB SERPL-MCNC: 0.8 MG/DL (ref 0–1)
BILIRUB SERPL-MCNC: 0.9 MG/DL (ref 0–1)
BILIRUB SERPL-MCNC: 1 MG/DL (ref 0–1)
BILIRUB SERPL-MCNC: 1.2 MG/DL (ref 0–1)
BILIRUB SERPL-MCNC: 1.3 MG/DL (ref 0–1)
BILIRUB SERPL-MCNC: 1.4 MG/DL (ref 0–1)
BILIRUB SERPL-MCNC: 1.9 MG/DL (ref 0–1)
BILIRUB SERPL-MCNC: 2.5 MG/DL (ref 0–1)
BILIRUB SERPL-MCNC: 2.9 MG/DL (ref 0–1)
BILIRUB SERPL-MCNC: <0.2 MG/DL (ref 0–1)
BILIRUBIN DIRECT: 2.7 MG/DL (ref 0–0.3)
BILIRUBIN URINE: NEGATIVE
BILIRUBIN, INDIRECT: -0.2 MG/DL (ref 0–1)
BLOOD CULTURE, ROUTINE: ABNORMAL
BLOOD CULTURE, ROUTINE: ABNORMAL
BLOOD CULTURE, ROUTINE: NORMAL
BLOOD, URINE: NEGATIVE
BUN BLDV-MCNC: 11 MG/DL (ref 7–20)
BUN BLDV-MCNC: 13 MG/DL (ref 7–20)
BUN BLDV-MCNC: 15 MG/DL (ref 7–20)
BUN BLDV-MCNC: 17 MG/DL (ref 7–20)
BUN BLDV-MCNC: 18 MG/DL (ref 7–20)
BUN BLDV-MCNC: 19 MG/DL (ref 7–20)
BUN BLDV-MCNC: 20 MG/DL (ref 7–20)
BUN BLDV-MCNC: 21 MG/DL (ref 7–20)
BUN BLDV-MCNC: 22 MG/DL (ref 7–20)
BUN BLDV-MCNC: 22 MG/DL (ref 7–20)
BUN BLDV-MCNC: 23 MG/DL (ref 7–20)
BUN BLDV-MCNC: 23 MG/DL (ref 7–20)
BUN BLDV-MCNC: 24 MG/DL (ref 7–20)
BUN BLDV-MCNC: 24 MG/DL (ref 7–20)
BUN BLDV-MCNC: 25 MG/DL (ref 7–20)
BUN BLDV-MCNC: 27 MG/DL (ref 7–20)
BUN BLDV-MCNC: 28 MG/DL (ref 7–20)
BUN BLDV-MCNC: 31 MG/DL (ref 7–20)
BUN BLDV-MCNC: 41 MG/DL (ref 7–20)
BUN BLDV-MCNC: 41 MG/DL (ref 7–20)
BUN BLDV-MCNC: 42 MG/DL (ref 7–20)
BUN BLDV-MCNC: 42 MG/DL (ref 7–20)
BUN BLDV-MCNC: 6 MG/DL (ref 7–20)
BUN BLDV-MCNC: 7 MG/DL (ref 7–20)
BUN BLDV-MCNC: 8 MG/DL (ref 7–20)
CALCIUM IONIZED: 0.97 MMOL/L (ref 1.12–1.32)
CALCIUM IONIZED: 1 MMOL/L (ref 1.12–1.32)
CALCIUM IONIZED: 1 MMOL/L (ref 1.12–1.32)
CALCIUM IONIZED: 1.08 MMOL/L (ref 1.12–1.32)
CALCIUM IONIZED: 1.09 MMOL/L (ref 1.12–1.32)
CALCIUM IONIZED: 1.1 MMOL/L (ref 1.12–1.32)
CALCIUM IONIZED: 1.11 MMOL/L (ref 1.12–1.32)
CALCIUM IONIZED: 1.11 MMOL/L (ref 1.12–1.32)
CALCIUM IONIZED: 1.12 MMOL/L (ref 1.12–1.32)
CALCIUM IONIZED: 1.13 MMOL/L (ref 1.12–1.32)
CALCIUM IONIZED: 1.14 MMOL/L (ref 1.12–1.32)
CALCIUM IONIZED: 1.15 MMOL/L (ref 1.12–1.32)
CALCIUM IONIZED: 1.15 MMOL/L (ref 1.12–1.32)
CALCIUM IONIZED: 1.17 MMOL/L (ref 1.12–1.32)
CALCIUM IONIZED: 1.17 MMOL/L (ref 1.12–1.32)
CALCIUM IONIZED: 1.18 MMOL/L (ref 1.12–1.32)
CALCIUM IONIZED: 1.2 MMOL/L (ref 1.12–1.32)
CALCIUM IONIZED: 1.22 MMOL/L (ref 1.12–1.32)
CALCIUM SERPL-MCNC: 7 MG/DL (ref 8.3–10.6)
CALCIUM SERPL-MCNC: 7.2 MG/DL (ref 8.3–10.6)
CALCIUM SERPL-MCNC: 7.3 MG/DL (ref 8.3–10.6)
CALCIUM SERPL-MCNC: 7.7 MG/DL (ref 8.3–10.6)
CALCIUM SERPL-MCNC: 7.8 MG/DL (ref 8.3–10.6)
CALCIUM SERPL-MCNC: 8.1 MG/DL (ref 8.3–10.6)
CALCIUM SERPL-MCNC: 8.1 MG/DL (ref 8.3–10.6)
CALCIUM SERPL-MCNC: 8.2 MG/DL (ref 8.3–10.6)
CALCIUM SERPL-MCNC: 8.2 MG/DL (ref 8.3–10.6)
CALCIUM SERPL-MCNC: 8.3 MG/DL (ref 8.3–10.6)
CALCIUM SERPL-MCNC: 8.5 MG/DL (ref 8.3–10.6)
CALCIUM SERPL-MCNC: 8.5 MG/DL (ref 8.3–10.6)
CALCIUM SERPL-MCNC: 8.6 MG/DL (ref 8.3–10.6)
CALCIUM SERPL-MCNC: 8.6 MG/DL (ref 8.3–10.6)
CALCIUM SERPL-MCNC: 8.7 MG/DL (ref 8.3–10.6)
CALCIUM SERPL-MCNC: 8.8 MG/DL (ref 8.3–10.6)
CALCIUM SERPL-MCNC: 8.8 MG/DL (ref 8.3–10.6)
CALCIUM SERPL-MCNC: 8.9 MG/DL (ref 8.3–10.6)
CALCIUM SERPL-MCNC: 9 MG/DL (ref 8.3–10.6)
CALCIUM SERPL-MCNC: 9.1 MG/DL (ref 8.3–10.6)
CALCIUM SERPL-MCNC: 9.2 MG/DL (ref 8.3–10.6)
CALCIUM SERPL-MCNC: 9.3 MG/DL (ref 8.3–10.6)
CALCIUM SERPL-MCNC: 9.7 MG/DL (ref 8.3–10.6)
CARBOXYHEMOGLOBIN ARTERIAL: 0.6 % (ref 0–1.5)
CARBOXYHEMOGLOBIN ARTERIAL: 0.8 % (ref 0–1.5)
CARBOXYHEMOGLOBIN ARTERIAL: 0.8 % (ref 0–1.5)
CARBOXYHEMOGLOBIN ARTERIAL: 1 % (ref 0–1.5)
CARBOXYHEMOGLOBIN ARTERIAL: 1.1 % (ref 0–1.5)
CARBOXYHEMOGLOBIN ARTERIAL: 1.3 % (ref 0–1.5)
CHLORIDE BLD-SCNC: 100 MMOL/L (ref 99–110)
CHLORIDE BLD-SCNC: 102 MMOL/L (ref 99–110)
CHLORIDE BLD-SCNC: 102 MMOL/L (ref 99–110)
CHLORIDE BLD-SCNC: 103 MMOL/L (ref 99–110)
CHLORIDE BLD-SCNC: 103 MMOL/L (ref 99–110)
CHLORIDE BLD-SCNC: 104 MMOL/L (ref 99–110)
CHLORIDE BLD-SCNC: 106 MMOL/L (ref 99–110)
CHLORIDE BLD-SCNC: 106 MMOL/L (ref 99–110)
CHLORIDE BLD-SCNC: 107 MMOL/L (ref 99–110)
CHLORIDE BLD-SCNC: 108 MMOL/L (ref 99–110)
CHLORIDE BLD-SCNC: 108 MMOL/L (ref 99–110)
CHLORIDE BLD-SCNC: 109 MMOL/L (ref 99–110)
CHLORIDE BLD-SCNC: 109 MMOL/L (ref 99–110)
CHLORIDE BLD-SCNC: 110 MMOL/L (ref 99–110)
CHLORIDE BLD-SCNC: 111 MMOL/L (ref 99–110)
CHLORIDE BLD-SCNC: 111 MMOL/L (ref 99–110)
CHLORIDE BLD-SCNC: 112 MMOL/L (ref 99–110)
CHLORIDE BLD-SCNC: 114 MMOL/L (ref 99–110)
CHLORIDE BLD-SCNC: 114 MMOL/L (ref 99–110)
CHLORIDE BLD-SCNC: 115 MMOL/L (ref 99–110)
CHLORIDE BLD-SCNC: 116 MMOL/L (ref 99–110)
CHLORIDE BLD-SCNC: 118 MMOL/L (ref 99–110)
CLARITY: CLEAR
CLOT EVALUATION CSF: ABNORMAL
CO2: 19 MMOL/L (ref 21–32)
CO2: 20 MMOL/L (ref 21–32)
CO2: 21 MMOL/L (ref 21–32)
CO2: 22 MMOL/L (ref 21–32)
CO2: 23 MMOL/L (ref 21–32)
CO2: 24 MMOL/L (ref 21–32)
CO2: 25 MMOL/L (ref 21–32)
CO2: 26 MMOL/L (ref 21–32)
COLOR CSF: COLORLESS
COLOR: YELLOW
CREAT SERPL-MCNC: 0.6 MG/DL (ref 0.6–1.2)
CREAT SERPL-MCNC: 0.7 MG/DL (ref 0.6–1.2)
CREAT SERPL-MCNC: 0.8 MG/DL (ref 0.6–1.2)
CREAT SERPL-MCNC: 1 MG/DL (ref 0.6–1.2)
CREAT SERPL-MCNC: 1.1 MG/DL (ref 0.6–1.2)
CREAT SERPL-MCNC: 1.3 MG/DL (ref 0.6–1.2)
CREAT SERPL-MCNC: 1.3 MG/DL (ref 0.6–1.2)
CSF CULTURE: NORMAL
CULTURE, BLOOD 2: ABNORMAL
CULTURE, BLOOD 2: NORMAL
CULTURE, FUNGUS BLOOD: NORMAL
CULTURE, RESPIRATORY: ABNORMAL
CULTURE, RESPIRATORY: ABNORMAL
CULTURE, RESPIRATORY: NORMAL
CULTURE, RESPIRATORY: NORMAL
DIGOXIN LEVEL: 0.8 NG/ML (ref 0.8–2)
EKG ATRIAL RATE: 125 BPM
EKG ATRIAL RATE: 133 BPM
EKG DIAGNOSIS: NORMAL
EKG DIAGNOSIS: NORMAL
EKG Q-T INTERVAL: 272 MS
EKG Q-T INTERVAL: 310 MS
EKG QRS DURATION: 62 MS
EKG QRS DURATION: 74 MS
EKG QTC CALCULATION (BAZETT): 427 MS
EKG QTC CALCULATION (BAZETT): 486 MS
EKG R AXIS: -17 DEGREES
EKG R AXIS: 20 DEGREES
EKG T AXIS: 38 DEGREES
EKG T AXIS: 39 DEGREES
EKG VENTRICULAR RATE: 148 BPM
EKG VENTRICULAR RATE: 148 BPM
EOSINOPHILS ABSOLUTE: 0 K/UL (ref 0–0.6)
EOSINOPHILS ABSOLUTE: 0.1 K/UL (ref 0–0.6)
EOSINOPHILS ABSOLUTE: 0.2 K/UL (ref 0–0.6)
EOSINOPHILS RELATIVE PERCENT: 0 %
EOSINOPHILS RELATIVE PERCENT: 0.1 %
EOSINOPHILS RELATIVE PERCENT: 0.1 %
EOSINOPHILS RELATIVE PERCENT: 0.3 %
EOSINOPHILS RELATIVE PERCENT: 0.6 %
EOSINOPHILS RELATIVE PERCENT: 1 %
EOSINOPHILS RELATIVE PERCENT: 1.9 %
EOSINOPHILS RELATIVE PERCENT: 1.9 %
EOSINOPHILS RELATIVE PERCENT: 2 %
EOSINOPHILS RELATIVE PERCENT: 3 %
EPITHELIAL CELLS, UA: 4 /HPF (ref 0–5)
FUNGUS (MYCOLOGY) CULTURE: NORMAL
FUNGUS STAIN: NORMAL
GFR AFRICAN AMERICAN: 48
GFR AFRICAN AMERICAN: 48
GFR AFRICAN AMERICAN: 58
GFR AFRICAN AMERICAN: >60
GFR NON-AFRICAN AMERICAN: 40
GFR NON-AFRICAN AMERICAN: 40
GFR NON-AFRICAN AMERICAN: 48
GFR NON-AFRICAN AMERICAN: 54
GFR NON-AFRICAN AMERICAN: >60
GLOBULIN: 2.3 G/DL
GLOBULIN: 2.6 G/DL
GLOBULIN: 2.6 G/DL
GLOBULIN: 2.7 G/DL
GLOBULIN: 2.8 G/DL
GLOBULIN: 3 G/DL
GLOBULIN: 3 G/DL
GLOBULIN: 3.1 G/DL
GLOBULIN: 3.1 G/DL
GLOBULIN: 3.2 G/DL
GLOBULIN: 3.3 G/DL
GLOBULIN: 3.4 G/DL
GLOBULIN: 3.4 G/DL
GLOBULIN: 3.5 G/DL
GLOBULIN: 3.5 G/DL
GLUCOSE BLD-MCNC: 101 MG/DL (ref 70–99)
GLUCOSE BLD-MCNC: 102 MG/DL (ref 70–99)
GLUCOSE BLD-MCNC: 102 MG/DL (ref 70–99)
GLUCOSE BLD-MCNC: 103 MG/DL (ref 70–99)
GLUCOSE BLD-MCNC: 104 MG/DL (ref 70–99)
GLUCOSE BLD-MCNC: 104 MG/DL (ref 70–99)
GLUCOSE BLD-MCNC: 105 MG/DL (ref 70–99)
GLUCOSE BLD-MCNC: 106 MG/DL (ref 70–99)
GLUCOSE BLD-MCNC: 107 MG/DL (ref 70–99)
GLUCOSE BLD-MCNC: 109 MG/DL (ref 70–99)
GLUCOSE BLD-MCNC: 110 MG/DL (ref 70–99)
GLUCOSE BLD-MCNC: 111 MG/DL (ref 70–99)
GLUCOSE BLD-MCNC: 111 MG/DL (ref 70–99)
GLUCOSE BLD-MCNC: 113 MG/DL (ref 70–99)
GLUCOSE BLD-MCNC: 115 MG/DL (ref 70–99)
GLUCOSE BLD-MCNC: 115 MG/DL (ref 70–99)
GLUCOSE BLD-MCNC: 116 MG/DL (ref 70–99)
GLUCOSE BLD-MCNC: 117 MG/DL (ref 70–99)
GLUCOSE BLD-MCNC: 118 MG/DL (ref 70–99)
GLUCOSE BLD-MCNC: 118 MG/DL (ref 70–99)
GLUCOSE BLD-MCNC: 119 MG/DL (ref 70–99)
GLUCOSE BLD-MCNC: 119 MG/DL (ref 70–99)
GLUCOSE BLD-MCNC: 120 MG/DL (ref 70–99)
GLUCOSE BLD-MCNC: 121 MG/DL (ref 70–99)
GLUCOSE BLD-MCNC: 122 MG/DL (ref 70–99)
GLUCOSE BLD-MCNC: 125 MG/DL (ref 70–99)
GLUCOSE BLD-MCNC: 125 MG/DL (ref 70–99)
GLUCOSE BLD-MCNC: 126 MG/DL (ref 70–99)
GLUCOSE BLD-MCNC: 128 MG/DL (ref 70–99)
GLUCOSE BLD-MCNC: 131 MG/DL (ref 70–99)
GLUCOSE BLD-MCNC: 132 MG/DL (ref 70–99)
GLUCOSE BLD-MCNC: 133 MG/DL (ref 70–99)
GLUCOSE BLD-MCNC: 134 MG/DL (ref 70–99)
GLUCOSE BLD-MCNC: 136 MG/DL (ref 70–99)
GLUCOSE BLD-MCNC: 137 MG/DL (ref 70–99)
GLUCOSE BLD-MCNC: 138 MG/DL (ref 70–99)
GLUCOSE BLD-MCNC: 138 MG/DL (ref 70–99)
GLUCOSE BLD-MCNC: 141 MG/DL (ref 70–99)
GLUCOSE BLD-MCNC: 142 MG/DL (ref 70–99)
GLUCOSE BLD-MCNC: 143 MG/DL (ref 70–99)
GLUCOSE BLD-MCNC: 145 MG/DL (ref 70–99)
GLUCOSE BLD-MCNC: 146 MG/DL (ref 70–99)
GLUCOSE BLD-MCNC: 147 MG/DL (ref 70–99)
GLUCOSE BLD-MCNC: 150 MG/DL (ref 70–99)
GLUCOSE BLD-MCNC: 152 MG/DL (ref 70–99)
GLUCOSE BLD-MCNC: 155 MG/DL (ref 70–99)
GLUCOSE BLD-MCNC: 163 MG/DL (ref 70–99)
GLUCOSE BLD-MCNC: 164 MG/DL (ref 70–99)
GLUCOSE BLD-MCNC: 165 MG/DL (ref 70–99)
GLUCOSE BLD-MCNC: 191 MG/DL (ref 70–99)
GLUCOSE BLD-MCNC: 193 MG/DL (ref 70–99)
GLUCOSE BLD-MCNC: 60 MG/DL (ref 70–99)
GLUCOSE BLD-MCNC: 64 MG/DL (ref 70–99)
GLUCOSE BLD-MCNC: 75 MG/DL (ref 70–99)
GLUCOSE BLD-MCNC: 77 MG/DL (ref 70–99)
GLUCOSE BLD-MCNC: 78 MG/DL (ref 70–99)
GLUCOSE BLD-MCNC: 80 MG/DL (ref 70–99)
GLUCOSE BLD-MCNC: 81 MG/DL (ref 70–99)
GLUCOSE BLD-MCNC: 81 MG/DL (ref 70–99)
GLUCOSE BLD-MCNC: 82 MG/DL (ref 70–99)
GLUCOSE BLD-MCNC: 82 MG/DL (ref 70–99)
GLUCOSE BLD-MCNC: 86 MG/DL (ref 70–99)
GLUCOSE BLD-MCNC: 86 MG/DL (ref 70–99)
GLUCOSE BLD-MCNC: 89 MG/DL (ref 70–99)
GLUCOSE BLD-MCNC: 90 MG/DL (ref 70–99)
GLUCOSE BLD-MCNC: 91 MG/DL (ref 70–99)
GLUCOSE BLD-MCNC: 92 MG/DL (ref 70–99)
GLUCOSE BLD-MCNC: 93 MG/DL (ref 70–99)
GLUCOSE BLD-MCNC: 95 MG/DL (ref 70–99)
GLUCOSE BLD-MCNC: 96 MG/DL (ref 70–99)
GLUCOSE BLD-MCNC: 98 MG/DL (ref 70–99)
GLUCOSE URINE: NEGATIVE MG/DL
GLUCOSE, CSF: 57 MG/DL (ref 40–80)
GRAM STAIN RESULT: ABNORMAL
GRAM STAIN RESULT: ABNORMAL
GRAM STAIN RESULT: NORMAL
HCO3 ARTERIAL: 17.9 MMOL/L (ref 21–29)
HCO3 ARTERIAL: 18.2 MMOL/L (ref 21–29)
HCO3 ARTERIAL: 19.4 MMOL/L (ref 21–29)
HCO3 ARTERIAL: 20 MMOL/L (ref 21–29)
HCO3 ARTERIAL: 20.8 MMOL/L (ref 21–29)
HCO3 ARTERIAL: 20.9 MMOL/L (ref 21–29)
HCO3 ARTERIAL: 23 MMOL/L (ref 21–29)
HCO3 ARTERIAL: 24.2 MMOL/L (ref 21–29)
HCO3 ARTERIAL: 26.2 MMOL/L (ref 21–29)
HCT VFR BLD CALC: 22.1 % (ref 36–48)
HCT VFR BLD CALC: 24.1 % (ref 36–48)
HCT VFR BLD CALC: 24.5 % (ref 36–48)
HCT VFR BLD CALC: 24.5 % (ref 36–48)
HCT VFR BLD CALC: 24.8 % (ref 36–48)
HCT VFR BLD CALC: 25 % (ref 36–48)
HCT VFR BLD CALC: 25.2 % (ref 36–48)
HCT VFR BLD CALC: 25.8 % (ref 36–48)
HCT VFR BLD CALC: 27.3 % (ref 36–48)
HCT VFR BLD CALC: 27.4 % (ref 36–48)
HCT VFR BLD CALC: 27.5 % (ref 36–48)
HCT VFR BLD CALC: 27.6 % (ref 36–48)
HCT VFR BLD CALC: 28.2 % (ref 36–48)
HCT VFR BLD CALC: 28.3 % (ref 36–48)
HCT VFR BLD CALC: 28.7 % (ref 36–48)
HCT VFR BLD CALC: 28.8 % (ref 36–48)
HCT VFR BLD CALC: 28.8 % (ref 36–48)
HCT VFR BLD CALC: 28.9 % (ref 36–48)
HCT VFR BLD CALC: 28.9 % (ref 36–48)
HCT VFR BLD CALC: 29 % (ref 36–48)
HCT VFR BLD CALC: 29.1 % (ref 36–48)
HCT VFR BLD CALC: 29.1 % (ref 36–48)
HCT VFR BLD CALC: 29.4 % (ref 36–48)
HCT VFR BLD CALC: 30.4 % (ref 36–48)
HCT VFR BLD CALC: 30.4 % (ref 36–48)
HCT VFR BLD CALC: 30.5 % (ref 36–48)
HCT VFR BLD CALC: 30.6 % (ref 36–48)
HCT VFR BLD CALC: 30.9 % (ref 36–48)
HCT VFR BLD CALC: 31.1 % (ref 36–48)
HCT VFR BLD CALC: 31.1 % (ref 36–48)
HCT VFR BLD CALC: 31.4 % (ref 36–48)
HCT VFR BLD CALC: 31.6 % (ref 36–48)
HCT VFR BLD CALC: 34.7 % (ref 36–48)
HCT VFR BLD CALC: 37.4 % (ref 36–48)
HEMATOLOGY PATH CONSULT: NO
HEMATOLOGY PATH CONSULT: NORMAL
HEMATOLOGY PATH CONSULT: YES
HEMOGLOBIN, ART, EXTENDED: 10.1 G/DL (ref 12–16)
HEMOGLOBIN, ART, EXTENDED: 10.2 G/DL (ref 12–16)
HEMOGLOBIN, ART, EXTENDED: 7.9 G/DL (ref 12–16)
HEMOGLOBIN, ART, EXTENDED: 9 G/DL (ref 12–16)
HEMOGLOBIN, ART, EXTENDED: 9.4 G/DL (ref 12–16)
HEMOGLOBIN, ART, EXTENDED: 9.4 G/DL (ref 12–16)
HEMOGLOBIN, ART, EXTENDED: 9.7 G/DL (ref 12–16)
HEMOGLOBIN, ART, EXTENDED: 9.9 G/DL (ref 12–16)
HEMOGLOBIN: 10 G/DL (ref 12–16)
HEMOGLOBIN: 10.3 G/DL (ref 12–16)
HEMOGLOBIN: 10.3 G/DL (ref 12–16)
HEMOGLOBIN: 11.3 G/DL (ref 12–16)
HEMOGLOBIN: 12.2 G/DL (ref 12–16)
HEMOGLOBIN: 7.2 G/DL (ref 12–16)
HEMOGLOBIN: 8 G/DL (ref 12–16)
HEMOGLOBIN: 8.1 G/DL (ref 12–16)
HEMOGLOBIN: 8.2 G/DL (ref 12–16)
HEMOGLOBIN: 8.2 G/DL (ref 12–16)
HEMOGLOBIN: 8.5 G/DL (ref 12–16)
HEMOGLOBIN: 8.7 G/DL (ref 12–16)
HEMOGLOBIN: 8.8 G/DL (ref 12–16)
HEMOGLOBIN: 8.9 G/DL (ref 12–16)
HEMOGLOBIN: 8.9 G/DL (ref 12–16)
HEMOGLOBIN: 9 G/DL (ref 12–16)
HEMOGLOBIN: 9.2 G/DL (ref 12–16)
HEMOGLOBIN: 9.3 G/DL (ref 12–16)
HEMOGLOBIN: 9.4 G/DL (ref 12–16)
HEMOGLOBIN: 9.5 G/DL (ref 12–16)
HEMOGLOBIN: 9.5 G/DL (ref 12–16)
HEMOGLOBIN: 9.6 G/DL (ref 12–16)
HEMOGLOBIN: 9.7 G/DL (ref 12–16)
HEMOGLOBIN: 9.8 G/DL (ref 12–16)
HEMOGLOBIN: 9.8 G/DL (ref 12–16)
HEMOGLOBIN: 9.9 G/DL (ref 12–16)
HERPES SIMPLEX VIRUS BY PCR: NOT DETECTED
HSV SOURCE: NORMAL
HYALINE CASTS: 5 /LPF (ref 0–8)
HYPOCHROMIA: ABNORMAL
INR BLD: 0.93 (ref 0.86–1.14)
INR BLD: 0.97 (ref 0.86–1.14)
INR BLD: 0.98 (ref 0.86–1.14)
INR BLD: 0.98 (ref 0.86–1.14)
INR BLD: 1.05 (ref 0.86–1.14)
INR BLD: 1.06 (ref 0.86–1.14)
INR BLD: 1.07 (ref 0.86–1.14)
INR BLD: 1.11 (ref 0.86–1.14)
INR BLD: 1.18 (ref 0.86–1.14)
INR BLD: 1.27 (ref 0.86–1.14)
INR BLD: 1.28 (ref 0.86–1.14)
INR BLD: 1.48 (ref 0.86–1.14)
INR BLD: 1.61 (ref 0.86–1.14)
INR BLD: 1.68 (ref 0.86–1.14)
INR BLD: 4.07 (ref 0.86–1.14)
INTERNATIONAL NORMALIZATION RATIO, POC: 1.7
INTERNATIONAL NORMALIZATION RATIO, POC: 2.3
INTERNATIONAL NORMALIZATION RATIO, POC: 2.3
INTERNATIONAL NORMALIZATION RATIO, POC: 2.6
INTERNATIONAL NORMALIZATION RATIO, POC: 2.7
INTERNATIONAL NORMALIZATION RATIO, POC: 3
INTERNATIONAL NORMALIZATION RATIO, POC: 3
INTERNATIONAL NORMALIZATION RATIO, POC: 3.1
INTERNATIONAL NORMALIZATION RATIO, POC: 4
KETONES, URINE: ABNORMAL MG/DL
KETONES, URINE: NEGATIVE MG/DL
KETONES, URINE: NEGATIVE MG/DL
L. PNEUMOPHILA SEROGP 1 UR AG: NORMAL
L. PNEUMOPHILA SEROGP 1 UR AG: NORMAL
LACTIC ACID: 0.8 MMOL/L (ref 0.4–2)
LACTIC ACID: 0.9 MMOL/L (ref 0.4–2)
LACTIC ACID: 1.8 MMOL/L (ref 0.4–2)
LACTIC ACID: 2.1 MMOL/L (ref 0.4–2)
LACTIC ACID: 2.4 MMOL/L (ref 0.4–2)
LEFT VENTRICULAR EJECTION FRACTION HIGH VALUE: 60 %
LEFT VENTRICULAR EJECTION FRACTION MODE: NORMAL
LEUKOCYTE ESTERASE, URINE: ABNORMAL
LEUKOCYTE ESTERASE, URINE: NEGATIVE
LEUKOCYTE ESTERASE, URINE: NEGATIVE
LIPASE: 103 U/L (ref 13–60)
LIPASE: 145 U/L (ref 13–60)
LIPASE: 157 U/L (ref 13–60)
LIPASE: 209 U/L (ref 13–60)
LIPASE: 35 U/L (ref 13–60)
LIPASE: 97 U/L (ref 13–60)
LV EF: 55 %
LV EF: 58 %
LVEF MODALITY: NORMAL
LYMPHOCYTES ABSOLUTE: 0 K/UL (ref 1–5.1)
LYMPHOCYTES ABSOLUTE: 0.2 K/UL (ref 1–5.1)
LYMPHOCYTES ABSOLUTE: 0.4 K/UL (ref 1–5.1)
LYMPHOCYTES ABSOLUTE: 0.5 K/UL (ref 1–5.1)
LYMPHOCYTES ABSOLUTE: 0.5 K/UL (ref 1–5.1)
LYMPHOCYTES ABSOLUTE: 0.6 K/UL (ref 1–5.1)
LYMPHOCYTES ABSOLUTE: 0.7 K/UL (ref 1–5.1)
LYMPHOCYTES ABSOLUTE: 0.9 K/UL (ref 1–5.1)
LYMPHOCYTES ABSOLUTE: 1.1 K/UL (ref 1–5.1)
LYMPHOCYTES ABSOLUTE: 1.2 K/UL (ref 1–5.1)
LYMPHOCYTES ABSOLUTE: 1.3 K/UL (ref 1–5.1)
LYMPHOCYTES ABSOLUTE: 1.5 K/UL (ref 1–5.1)
LYMPHOCYTES ABSOLUTE: 1.6 K/UL (ref 1–5.1)
LYMPHOCYTES ABSOLUTE: 1.8 K/UL (ref 1–5.1)
LYMPHOCYTES RELATIVE PERCENT: 0 %
LYMPHOCYTES RELATIVE PERCENT: 10 %
LYMPHOCYTES RELATIVE PERCENT: 11.2 %
LYMPHOCYTES RELATIVE PERCENT: 13 %
LYMPHOCYTES RELATIVE PERCENT: 14 %
LYMPHOCYTES RELATIVE PERCENT: 14 %
LYMPHOCYTES RELATIVE PERCENT: 15.9 %
LYMPHOCYTES RELATIVE PERCENT: 16.7 %
LYMPHOCYTES RELATIVE PERCENT: 2.8 %
LYMPHOCYTES RELATIVE PERCENT: 3.4 %
LYMPHOCYTES RELATIVE PERCENT: 3.9 %
LYMPHOCYTES RELATIVE PERCENT: 4 %
LYMPHOCYTES RELATIVE PERCENT: 4.9 %
LYMPHOCYTES RELATIVE PERCENT: 5 %
LYMPHOCYTES RELATIVE PERCENT: 8 %
LYMPHOCYTES RELATIVE PERCENT: 9 %
LYMPHOCYTES RELATIVE PERCENT: 9.3 %
LYMPHOCYTES RELATIVE PERCENT: 9.4 %
MAGNESIUM: 1.5 MG/DL (ref 1.8–2.4)
MAGNESIUM: 1.6 MG/DL (ref 1.8–2.4)
MAGNESIUM: 1.6 MG/DL (ref 1.8–2.4)
MAGNESIUM: 1.7 MG/DL (ref 1.8–2.4)
MAGNESIUM: 1.8 MG/DL (ref 1.8–2.4)
MAGNESIUM: 1.9 MG/DL (ref 1.8–2.4)
MAGNESIUM: 2 MG/DL (ref 1.8–2.4)
MAGNESIUM: 2 MG/DL (ref 1.8–2.4)
MAGNESIUM: 2.1 MG/DL (ref 1.8–2.4)
MAGNESIUM: 2.2 MG/DL (ref 1.8–2.4)
MAGNESIUM: 2.2 MG/DL (ref 1.8–2.4)
MAGNESIUM: 2.3 MG/DL (ref 1.8–2.4)
MAGNESIUM: 2.4 MG/DL (ref 1.8–2.4)
MCH RBC QN AUTO: 25.5 PG (ref 26–34)
MCH RBC QN AUTO: 25.9 PG (ref 26–34)
MCH RBC QN AUTO: 25.9 PG (ref 26–34)
MCH RBC QN AUTO: 26 PG (ref 26–34)
MCH RBC QN AUTO: 26.1 PG (ref 26–34)
MCH RBC QN AUTO: 27.9 PG (ref 26–34)
MCH RBC QN AUTO: 27.9 PG (ref 26–34)
MCH RBC QN AUTO: 28.6 PG (ref 26–34)
MCH RBC QN AUTO: 28.7 PG (ref 26–34)
MCH RBC QN AUTO: 28.7 PG (ref 26–34)
MCH RBC QN AUTO: 28.8 PG (ref 26–34)
MCH RBC QN AUTO: 28.9 PG (ref 26–34)
MCH RBC QN AUTO: 29 PG (ref 26–34)
MCH RBC QN AUTO: 29.1 PG (ref 26–34)
MCH RBC QN AUTO: 29.2 PG (ref 26–34)
MCH RBC QN AUTO: 29.2 PG (ref 26–34)
MCH RBC QN AUTO: 29.3 PG (ref 26–34)
MCHC RBC AUTO-ENTMCNC: 31.4 G/DL (ref 31–36)
MCHC RBC AUTO-ENTMCNC: 31.7 G/DL (ref 31–36)
MCHC RBC AUTO-ENTMCNC: 31.8 G/DL (ref 31–36)
MCHC RBC AUTO-ENTMCNC: 31.9 G/DL (ref 31–36)
MCHC RBC AUTO-ENTMCNC: 32 G/DL (ref 31–36)
MCHC RBC AUTO-ENTMCNC: 32 G/DL (ref 31–36)
MCHC RBC AUTO-ENTMCNC: 32.1 G/DL (ref 31–36)
MCHC RBC AUTO-ENTMCNC: 32.1 G/DL (ref 31–36)
MCHC RBC AUTO-ENTMCNC: 32.2 G/DL (ref 31–36)
MCHC RBC AUTO-ENTMCNC: 32.3 G/DL (ref 31–36)
MCHC RBC AUTO-ENTMCNC: 32.3 G/DL (ref 31–36)
MCHC RBC AUTO-ENTMCNC: 32.4 G/DL (ref 31–36)
MCHC RBC AUTO-ENTMCNC: 32.4 G/DL (ref 31–36)
MCHC RBC AUTO-ENTMCNC: 32.5 G/DL (ref 31–36)
MCHC RBC AUTO-ENTMCNC: 32.6 G/DL (ref 31–36)
MCHC RBC AUTO-ENTMCNC: 32.7 G/DL (ref 31–36)
MCHC RBC AUTO-ENTMCNC: 32.8 G/DL (ref 31–36)
MCHC RBC AUTO-ENTMCNC: 32.9 G/DL (ref 31–36)
MCHC RBC AUTO-ENTMCNC: 33 G/DL (ref 31–36)
MCHC RBC AUTO-ENTMCNC: 33.1 G/DL (ref 31–36)
MCV RBC AUTO: 78.5 FL (ref 80–100)
MCV RBC AUTO: 79.2 FL (ref 80–100)
MCV RBC AUTO: 79.3 FL (ref 80–100)
MCV RBC AUTO: 79.4 FL (ref 80–100)
MCV RBC AUTO: 79.5 FL (ref 80–100)
MCV RBC AUTO: 79.5 FL (ref 80–100)
MCV RBC AUTO: 82.7 FL (ref 80–100)
MCV RBC AUTO: 85.8 FL (ref 80–100)
MCV RBC AUTO: 87.2 FL (ref 80–100)
MCV RBC AUTO: 87.7 FL (ref 80–100)
MCV RBC AUTO: 87.7 FL (ref 80–100)
MCV RBC AUTO: 87.9 FL (ref 80–100)
MCV RBC AUTO: 88.1 FL (ref 80–100)
MCV RBC AUTO: 88.2 FL (ref 80–100)
MCV RBC AUTO: 88.3 FL (ref 80–100)
MCV RBC AUTO: 88.7 FL (ref 80–100)
MCV RBC AUTO: 88.7 FL (ref 80–100)
MCV RBC AUTO: 88.8 FL (ref 80–100)
MCV RBC AUTO: 89.1 FL (ref 80–100)
MCV RBC AUTO: 89.2 FL (ref 80–100)
MCV RBC AUTO: 89.2 FL (ref 80–100)
MCV RBC AUTO: 89.4 FL (ref 80–100)
MCV RBC AUTO: 89.6 FL (ref 80–100)
MCV RBC AUTO: 89.9 FL (ref 80–100)
MCV RBC AUTO: 89.9 FL (ref 80–100)
MCV RBC AUTO: 90.2 FL (ref 80–100)
MCV RBC AUTO: 90.4 FL (ref 80–100)
MCV RBC AUTO: 90.4 FL (ref 80–100)
MCV RBC AUTO: 90.6 FL (ref 80–100)
MENINGITIS ENCEPHALITIS PANEL: NORMAL
METAMYELOCYTES RELATIVE PERCENT: 3 %
METHEMOGLOBIN ARTERIAL: 0.2 %
METHEMOGLOBIN ARTERIAL: 0.2 %
METHEMOGLOBIN ARTERIAL: 0.3 %
METHEMOGLOBIN ARTERIAL: 0.3 %
METHEMOGLOBIN ARTERIAL: 0.4 %
METHEMOGLOBIN ARTERIAL: 0.5 %
METHEMOGLOBIN ARTERIAL: 0.7 %
METHEMOGLOBIN ARTERIAL: 0.7 %
MICROCYTES: ABNORMAL
MICROCYTES: ABNORMAL
MICROSCOPIC EXAMINATION: NORMAL
MICROSCOPIC EXAMINATION: NORMAL
MICROSCOPIC EXAMINATION: YES
MONOCYTES ABSOLUTE: 0 K/UL (ref 0–1.3)
MONOCYTES ABSOLUTE: 0.2 K/UL (ref 0–1.3)
MONOCYTES ABSOLUTE: 0.4 K/UL (ref 0–1.3)
MONOCYTES ABSOLUTE: 0.4 K/UL (ref 0–1.3)
MONOCYTES ABSOLUTE: 0.5 K/UL (ref 0–1.3)
MONOCYTES ABSOLUTE: 0.5 K/UL (ref 0–1.3)
MONOCYTES ABSOLUTE: 0.6 K/UL (ref 0–1.3)
MONOCYTES ABSOLUTE: 0.7 K/UL (ref 0–1.3)
MONOCYTES ABSOLUTE: 0.8 K/UL (ref 0–1.3)
MONOCYTES ABSOLUTE: 0.8 K/UL (ref 0–1.3)
MONOCYTES ABSOLUTE: 1 K/UL (ref 0–1.3)
MONOCYTES ABSOLUTE: 1.1 K/UL (ref 0–1.3)
MONOCYTES ABSOLUTE: 1.2 K/UL (ref 0–1.3)
MONOCYTES ABSOLUTE: 1.5 K/UL (ref 0–1.3)
MONOCYTES ABSOLUTE: 1.5 K/UL (ref 0–1.3)
MONOCYTES ABSOLUTE: 2.4 K/UL (ref 0–1.3)
MONOCYTES RELATIVE PERCENT: 0.5 %
MONOCYTES RELATIVE PERCENT: 10 %
MONOCYTES RELATIVE PERCENT: 10.2 %
MONOCYTES RELATIVE PERCENT: 11.6 %
MONOCYTES RELATIVE PERCENT: 11.7 %
MONOCYTES RELATIVE PERCENT: 14 %
MONOCYTES RELATIVE PERCENT: 16 %
MONOCYTES RELATIVE PERCENT: 20 %
MONOCYTES RELATIVE PERCENT: 3.8 %
MONOCYTES RELATIVE PERCENT: 3.8 %
MONOCYTES RELATIVE PERCENT: 4 %
MONOCYTES RELATIVE PERCENT: 4 %
MONOCYTES RELATIVE PERCENT: 4.7 %
MONOCYTES RELATIVE PERCENT: 6 %
MONOCYTES RELATIVE PERCENT: 6.4 %
MONOCYTES RELATIVE PERCENT: 6.4 %
MONOCYTES RELATIVE PERCENT: 8 %
MONOCYTES RELATIVE PERCENT: 8.4 %
MYELOCYTE PERCENT: 3 %
MYELOCYTE PERCENT: 5 %
NEUTROPHILS ABSOLUTE: 11.8 K/UL (ref 1.7–7.7)
NEUTROPHILS ABSOLUTE: 15.6 K/UL (ref 1.7–7.7)
NEUTROPHILS ABSOLUTE: 16.5 K/UL (ref 1.7–7.7)
NEUTROPHILS ABSOLUTE: 3.6 K/UL (ref 1.7–7.7)
NEUTROPHILS ABSOLUTE: 4.3 K/UL (ref 1.7–7.7)
NEUTROPHILS ABSOLUTE: 5.6 K/UL (ref 1.7–7.7)
NEUTROPHILS ABSOLUTE: 6.2 K/UL (ref 1.7–7.7)
NEUTROPHILS ABSOLUTE: 6.4 K/UL (ref 1.7–7.7)
NEUTROPHILS ABSOLUTE: 6.5 K/UL (ref 1.7–7.7)
NEUTROPHILS ABSOLUTE: 6.7 K/UL (ref 1.7–7.7)
NEUTROPHILS ABSOLUTE: 7.2 K/UL (ref 1.7–7.7)
NEUTROPHILS ABSOLUTE: 7.2 K/UL (ref 1.7–7.7)
NEUTROPHILS ABSOLUTE: 7.4 K/UL (ref 1.7–7.7)
NEUTROPHILS ABSOLUTE: 7.8 K/UL (ref 1.7–7.7)
NEUTROPHILS ABSOLUTE: 8.4 K/UL (ref 1.7–7.7)
NEUTROPHILS ABSOLUTE: 9 K/UL (ref 1.7–7.7)
NEUTROPHILS ABSOLUTE: 9.1 K/UL (ref 1.7–7.7)
NEUTROPHILS ABSOLUTE: 9.7 K/UL (ref 1.7–7.7)
NEUTROPHILS RELATIVE PERCENT: 68.9 %
NEUTROPHILS RELATIVE PERCENT: 69 %
NEUTROPHILS RELATIVE PERCENT: 70 %
NEUTROPHILS RELATIVE PERCENT: 71 %
NEUTROPHILS RELATIVE PERCENT: 71.2 %
NEUTROPHILS RELATIVE PERCENT: 72 %
NEUTROPHILS RELATIVE PERCENT: 74 %
NEUTROPHILS RELATIVE PERCENT: 74 %
NEUTROPHILS RELATIVE PERCENT: 75.4 %
NEUTROPHILS RELATIVE PERCENT: 80 %
NEUTROPHILS RELATIVE PERCENT: 81.4 %
NEUTROPHILS RELATIVE PERCENT: 86.4 %
NEUTROPHILS RELATIVE PERCENT: 90 %
NEUTROPHILS RELATIVE PERCENT: 90.1 %
NEUTROPHILS RELATIVE PERCENT: 90.2 %
NEUTROPHILS RELATIVE PERCENT: 90.3 %
NEUTROPHILS RELATIVE PERCENT: 91.2 %
NEUTROPHILS RELATIVE PERCENT: 93.3 %
NITRITE, URINE: NEGATIVE
NO DIFFERENTIAL CSF: ABNORMAL
O2 CONTENT ARTERIAL: 11 ML/DL
O2 CONTENT ARTERIAL: 12 ML/DL
O2 CONTENT ARTERIAL: 13 ML/DL
O2 CONTENT ARTERIAL: 14 ML/DL
O2 SAT, ARTERIAL: 97.4 %
O2 SAT, ARTERIAL: 98 %
O2 SAT, ARTERIAL: 98.4 %
O2 SAT, ARTERIAL: 98.6 %
O2 SAT, ARTERIAL: 98.9 %
O2 SAT, ARTERIAL: 99 % (ref 93–100)
O2 SAT, ARTERIAL: 99.2 %
O2 SAT, ARTERIAL: 99.7 %
O2 SAT, ARTERIAL: 99.7 %
O2 THERAPY: ABNORMAL
ORGANISM: ABNORMAL
OSMOLALITY URINE: 154 MOSM/KG (ref 390–1070)
OVALOCYTES: ABNORMAL
OVALOCYTES: ABNORMAL
PCO2 ARTERIAL: 31.3 MM HG (ref 35–45)
PCO2 ARTERIAL: 33.8 MMHG (ref 35–45)
PCO2 ARTERIAL: 35.7 MMHG (ref 35–45)
PCO2 ARTERIAL: 38.1 MMHG (ref 35–45)
PCO2 ARTERIAL: 40.5 MMHG (ref 35–45)
PCO2 ARTERIAL: 41.8 MMHG (ref 35–45)
PCO2 ARTERIAL: 41.8 MMHG (ref 35–45)
PCO2 ARTERIAL: 42.1 MMHG (ref 35–45)
PCO2 ARTERIAL: 44.1 MMHG (ref 35–45)
PDW BLD-RTO: 14.5 % (ref 12.4–15.4)
PDW BLD-RTO: 14.7 % (ref 12.4–15.4)
PDW BLD-RTO: 14.8 % (ref 12.4–15.4)
PDW BLD-RTO: 14.9 % (ref 12.4–15.4)
PDW BLD-RTO: 14.9 % (ref 12.4–15.4)
PDW BLD-RTO: 15.1 % (ref 12.4–15.4)
PDW BLD-RTO: 15.2 % (ref 12.4–15.4)
PDW BLD-RTO: 15.2 % (ref 12.4–15.4)
PDW BLD-RTO: 15.3 % (ref 12.4–15.4)
PDW BLD-RTO: 15.3 % (ref 12.4–15.4)
PDW BLD-RTO: 15.4 % (ref 12.4–15.4)
PDW BLD-RTO: 15.4 % (ref 12.4–15.4)
PDW BLD-RTO: 15.5 % (ref 12.4–15.4)
PDW BLD-RTO: 15.6 % (ref 12.4–15.4)
PDW BLD-RTO: 15.7 % (ref 12.4–15.4)
PDW BLD-RTO: 15.8 % (ref 12.4–15.4)
PDW BLD-RTO: 15.9 % (ref 12.4–15.4)
PDW BLD-RTO: 15.9 % (ref 12.4–15.4)
PDW BLD-RTO: 16 % (ref 12.4–15.4)
PDW BLD-RTO: 16 % (ref 12.4–15.4)
PDW BLD-RTO: 16.1 % (ref 12.4–15.4)
PDW BLD-RTO: 16.2 % (ref 12.4–15.4)
PDW BLD-RTO: 16.3 % (ref 12.4–15.4)
PDW BLD-RTO: 16.5 % (ref 12.4–15.4)
PERFORMED ON: ABNORMAL
PERFORMED ON: NORMAL
PH ARTERIAL: 7.26 (ref 7.35–7.45)
PH ARTERIAL: 7.28 (ref 7.35–7.45)
PH ARTERIAL: 7.31 (ref 7.35–7.45)
PH ARTERIAL: 7.31 (ref 7.35–7.45)
PH ARTERIAL: 7.45 (ref 7.35–7.45)
PH ARTERIAL: 7.46 (ref 7.35–7.45)
PH ARTERIAL: 7.47 (ref 7.35–7.45)
PH UA: 6 (ref 5–8)
PH UA: 7 (ref 5–8)
PH UA: 7 (ref 5–8)
PH VENOUS: 7.24 (ref 7.35–7.45)
PH VENOUS: 7.29 (ref 7.35–7.45)
PH VENOUS: 7.3 (ref 7.35–7.45)
PH VENOUS: 7.32 (ref 7.35–7.45)
PH VENOUS: 7.32 (ref 7.35–7.45)
PH VENOUS: 7.35 (ref 7.35–7.45)
PH VENOUS: 7.36 (ref 7.35–7.45)
PH VENOUS: 7.36 (ref 7.35–7.45)
PH VENOUS: 7.39 (ref 7.35–7.45)
PH VENOUS: 7.4 (ref 7.35–7.45)
PH VENOUS: 7.41 (ref 7.35–7.45)
PH VENOUS: 7.42 (ref 7.35–7.45)
PH VENOUS: 7.43 (ref 7.35–7.45)
PH VENOUS: 7.43 (ref 7.35–7.45)
PH VENOUS: 7.44 (ref 7.35–7.45)
PH VENOUS: 7.45 (ref 7.35–7.45)
PH VENOUS: 7.45 (ref 7.35–7.45)
PHOSPHORUS: 1.2 MG/DL (ref 2.5–4.9)
PHOSPHORUS: 2 MG/DL (ref 2.5–4.9)
PHOSPHORUS: 2.2 MG/DL (ref 2.5–4.9)
PHOSPHORUS: 2.2 MG/DL (ref 2.5–4.9)
PHOSPHORUS: 2.8 MG/DL (ref 2.5–4.9)
PHOSPHORUS: 2.9 MG/DL (ref 2.5–4.9)
PHOSPHORUS: 3 MG/DL (ref 2.5–4.9)
PHOSPHORUS: 3.2 MG/DL (ref 2.5–4.9)
PHOSPHORUS: 3.2 MG/DL (ref 2.5–4.9)
PHOSPHORUS: 3.3 MG/DL (ref 2.5–4.9)
PHOSPHORUS: 3.4 MG/DL (ref 2.5–4.9)
PHOSPHORUS: 3.4 MG/DL (ref 2.5–4.9)
PHOSPHORUS: 3.6 MG/DL (ref 2.5–4.9)
PHOSPHORUS: 3.6 MG/DL (ref 2.5–4.9)
PHOSPHORUS: 4.1 MG/DL (ref 2.5–4.9)
PLATELET # BLD: 131 K/UL (ref 135–450)
PLATELET # BLD: 133 K/UL (ref 135–450)
PLATELET # BLD: 134 K/UL (ref 135–450)
PLATELET # BLD: 139 K/UL (ref 135–450)
PLATELET # BLD: 156 K/UL (ref 135–450)
PLATELET # BLD: 161 K/UL (ref 135–450)
PLATELET # BLD: 163 K/UL (ref 135–450)
PLATELET # BLD: 170 K/UL (ref 135–450)
PLATELET # BLD: 171 K/UL (ref 135–450)
PLATELET # BLD: 171 K/UL (ref 135–450)
PLATELET # BLD: 215 K/UL (ref 135–450)
PLATELET # BLD: 226 K/UL (ref 135–450)
PLATELET # BLD: 261 K/UL (ref 135–450)
PLATELET # BLD: 264 K/UL (ref 135–450)
PLATELET # BLD: 293 K/UL (ref 135–450)
PLATELET # BLD: 302 K/UL (ref 135–450)
PLATELET # BLD: 305 K/UL (ref 135–450)
PLATELET # BLD: 312 K/UL (ref 135–450)
PLATELET # BLD: 327 K/UL (ref 135–450)
PLATELET # BLD: 327 K/UL (ref 135–450)
PLATELET # BLD: 329 K/UL (ref 135–450)
PLATELET # BLD: 339 K/UL (ref 135–450)
PLATELET # BLD: 347 K/UL (ref 135–450)
PLATELET # BLD: 351 K/UL (ref 135–450)
PLATELET # BLD: 370 K/UL (ref 135–450)
PLATELET # BLD: 371 K/UL (ref 135–450)
PLATELET # BLD: 403 K/UL (ref 135–450)
PLATELET # BLD: 453 K/UL (ref 135–450)
PLATELET # BLD: 491 K/UL (ref 135–450)
PLATELET # BLD: 494 K/UL (ref 135–450)
PLATELET # BLD: 529 K/UL (ref 135–450)
PLATELET # BLD: 602 K/UL (ref 135–450)
PLATELET # BLD: 616 K/UL (ref 135–450)
PLATELET SLIDE REVIEW: ABNORMAL
PLATELET SLIDE REVIEW: ADEQUATE
PMV BLD AUTO: 7.5 FL (ref 5–10.5)
PMV BLD AUTO: 7.5 FL (ref 5–10.5)
PMV BLD AUTO: 7.6 FL (ref 5–10.5)
PMV BLD AUTO: 7.8 FL (ref 5–10.5)
PMV BLD AUTO: 7.8 FL (ref 5–10.5)
PMV BLD AUTO: 7.9 FL (ref 5–10.5)
PMV BLD AUTO: 8 FL (ref 5–10.5)
PMV BLD AUTO: 8 FL (ref 5–10.5)
PMV BLD AUTO: 8.4 FL (ref 5–10.5)
PMV BLD AUTO: 8.5 FL (ref 5–10.5)
PMV BLD AUTO: 8.6 FL (ref 5–10.5)
PMV BLD AUTO: 8.7 FL (ref 5–10.5)
PMV BLD AUTO: 8.8 FL (ref 5–10.5)
PMV BLD AUTO: 8.9 FL (ref 5–10.5)
PO2 ARTERIAL: 101 MMHG (ref 75–108)
PO2 ARTERIAL: 105 MMHG (ref 75–108)
PO2 ARTERIAL: 107.7 MM HG (ref 75–108)
PO2 ARTERIAL: 118 MMHG (ref 75–108)
PO2 ARTERIAL: 118 MMHG (ref 75–108)
PO2 ARTERIAL: 136 MMHG (ref 75–108)
PO2 ARTERIAL: 162 MMHG (ref 75–108)
PO2 ARTERIAL: 86 MMHG (ref 75–108)
PO2 ARTERIAL: 89.5 MMHG (ref 75–108)
POC SAMPLE TYPE: ABNORMAL
POLYCHROMASIA: ABNORMAL
POTASSIUM REFLEX MAGNESIUM: 3.2 MMOL/L (ref 3.5–5.1)
POTASSIUM REFLEX MAGNESIUM: 3.2 MMOL/L (ref 3.5–5.1)
POTASSIUM REFLEX MAGNESIUM: 3.6 MMOL/L (ref 3.5–5.1)
POTASSIUM REFLEX MAGNESIUM: 3.9 MMOL/L (ref 3.5–5.1)
POTASSIUM REFLEX MAGNESIUM: 4 MMOL/L (ref 3.5–5.1)
POTASSIUM REFLEX MAGNESIUM: 4 MMOL/L (ref 3.5–5.1)
POTASSIUM REFLEX MAGNESIUM: 4.1 MMOL/L (ref 3.5–5.1)
POTASSIUM REFLEX MAGNESIUM: 4.2 MMOL/L (ref 3.5–5.1)
POTASSIUM REFLEX MAGNESIUM: 4.3 MMOL/L (ref 3.5–5.1)
POTASSIUM REFLEX MAGNESIUM: 4.3 MMOL/L (ref 3.5–5.1)
POTASSIUM REFLEX MAGNESIUM: 4.4 MMOL/L (ref 3.5–5.1)
POTASSIUM REFLEX MAGNESIUM: 4.4 MMOL/L (ref 3.5–5.1)
POTASSIUM REFLEX MAGNESIUM: 4.5 MMOL/L (ref 3.5–5.1)
POTASSIUM REFLEX MAGNESIUM: 4.5 MMOL/L (ref 3.5–5.1)
POTASSIUM REFLEX MAGNESIUM: 4.6 MMOL/L (ref 3.5–5.1)
POTASSIUM REFLEX MAGNESIUM: 4.6 MMOL/L (ref 3.5–5.1)
POTASSIUM REFLEX MAGNESIUM: 4.9 MMOL/L (ref 3.5–5.1)
POTASSIUM SERPL-SCNC: 3.7 MMOL/L (ref 3.5–5.1)
POTASSIUM SERPL-SCNC: 3.8 MMOL/L (ref 3.5–5.1)
POTASSIUM SERPL-SCNC: 3.8 MMOL/L (ref 3.5–5.1)
POTASSIUM SERPL-SCNC: 3.9 MMOL/L (ref 3.5–5.1)
POTASSIUM SERPL-SCNC: 4 MMOL/L (ref 3.5–5.1)
POTASSIUM SERPL-SCNC: 4.1 MMOL/L (ref 3.5–5.1)
POTASSIUM SERPL-SCNC: 4.2 MMOL/L (ref 3.5–5.1)
POTASSIUM SERPL-SCNC: 4.5 MMOL/L (ref 3.5–5.1)
POTASSIUM, UR: 3.9 MMOL/L
PRO-BNP: 1786 PG/ML (ref 0–449)
PRO-BNP: 4131 PG/ML (ref 0–449)
PROCALCITONIN: 0.12 NG/ML (ref 0–0.15)
PROMYELOCYTES PERCENT: 1 %
PROTEIN CSF: 72 MG/DL (ref 15–45)
PROTEIN UA: NEGATIVE MG/DL
PROTHROMBIN TIME: 10.6 SEC (ref 9.8–13)
PROTHROMBIN TIME: 11.1 SEC (ref 9.8–13)
PROTHROMBIN TIME: 11.2 SEC (ref 9.8–13)
PROTHROMBIN TIME: 11.2 SEC (ref 9.8–13)
PROTHROMBIN TIME: 12 SEC (ref 9.8–13)
PROTHROMBIN TIME: 12.1 SEC (ref 9.8–13)
PROTHROMBIN TIME: 12.2 SEC (ref 9.8–13)
PROTHROMBIN TIME: 12.6 SEC (ref 9.8–13)
PROTHROMBIN TIME: 12.7 SEC (ref 9.8–13)
PROTHROMBIN TIME: 12.7 SEC (ref 9.8–13)
PROTHROMBIN TIME: 13.5 SEC (ref 9.8–13)
PROTHROMBIN TIME: 14.5 SEC (ref 9.8–13)
PROTHROMBIN TIME: 14.6 SEC (ref 9.8–13)
PROTHROMBIN TIME: 16.9 SEC (ref 9.8–13)
PROTHROMBIN TIME: 18.4 SEC (ref 9.8–13)
PROTHROMBIN TIME: 19.1 SEC (ref 9.8–13)
PROTHROMBIN TIME: 46.4 SEC (ref 9.8–13)
RBC # BLD: 2.51 M/UL (ref 4–5.2)
RBC # BLD: 2.74 M/UL (ref 4–5.2)
RBC # BLD: 2.77 M/UL (ref 4–5.2)
RBC # BLD: 2.79 M/UL (ref 4–5.2)
RBC # BLD: 3.04 M/UL (ref 4–5.2)
RBC # BLD: 3.08 M/UL (ref 4–5.2)
RBC # BLD: 3.12 M/UL (ref 4–5.2)
RBC # BLD: 3.13 M/UL (ref 4–5.2)
RBC # BLD: 3.14 M/UL (ref 4–5.2)
RBC # BLD: 3.15 M/UL (ref 4–5.2)
RBC # BLD: 3.18 M/UL (ref 4–5.2)
RBC # BLD: 3.24 M/UL (ref 4–5.2)
RBC # BLD: 3.25 M/UL (ref 4–5.2)
RBC # BLD: 3.26 M/UL (ref 4–5.2)
RBC # BLD: 3.28 M/UL (ref 4–5.2)
RBC # BLD: 3.3 M/UL (ref 4–5.2)
RBC # BLD: 3.31 M/UL (ref 4–5.2)
RBC # BLD: 3.34 M/UL (ref 4–5.2)
RBC # BLD: 3.37 M/UL (ref 4–5.2)
RBC # BLD: 3.41 M/UL (ref 4–5.2)
RBC # BLD: 3.44 M/UL (ref 4–5.2)
RBC # BLD: 3.44 M/UL (ref 4–5.2)
RBC # BLD: 3.45 M/UL (ref 4–5.2)
RBC # BLD: 3.48 M/UL (ref 4–5.2)
RBC # BLD: 3.51 M/UL (ref 4–5.2)
RBC # BLD: 3.56 M/UL (ref 4–5.2)
RBC # BLD: 3.63 M/UL (ref 4–5.2)
RBC # BLD: 3.7 M/UL (ref 4–5.2)
RBC # BLD: 4.05 M/UL (ref 4–5.2)
RBC # BLD: 4.18 M/UL (ref 4–5.2)
RBC # BLD: NORMAL 10*6/UL
RBC # BLD: NORMAL 10*6/UL
RBC CSF: 1 /CUMM
RBC UA: 4 /HPF (ref 0–4)
REPORT: NORMAL
SLIDE REVIEW: ABNORMAL
SODIUM BLD-SCNC: 135 MMOL/L (ref 136–145)
SODIUM BLD-SCNC: 137 MMOL/L (ref 136–145)
SODIUM BLD-SCNC: 138 MMOL/L (ref 136–145)
SODIUM BLD-SCNC: 138 MMOL/L (ref 136–145)
SODIUM BLD-SCNC: 139 MMOL/L (ref 136–145)
SODIUM BLD-SCNC: 140 MMOL/L (ref 136–145)
SODIUM BLD-SCNC: 141 MMOL/L (ref 136–145)
SODIUM BLD-SCNC: 141 MMOL/L (ref 136–145)
SODIUM BLD-SCNC: 142 MMOL/L (ref 136–145)
SODIUM BLD-SCNC: 143 MMOL/L (ref 136–145)
SODIUM BLD-SCNC: 144 MMOL/L (ref 136–145)
SODIUM BLD-SCNC: 145 MMOL/L (ref 136–145)
SODIUM BLD-SCNC: 146 MMOL/L (ref 136–145)
SODIUM BLD-SCNC: 147 MMOL/L (ref 136–145)
SODIUM BLD-SCNC: 148 MMOL/L (ref 136–145)
SODIUM URINE: 58 MMOL/L
SPECIFIC GRAVITY UA: 1 (ref 1–1.03)
SPECIFIC GRAVITY UA: 1.01 (ref 1–1.03)
SPECIFIC GRAVITY UA: 1.01 (ref 1–1.03)
STOMATOCYTES: ABNORMAL
STREP PNEUMONIAE ANTIGEN, URINE: NORMAL
TCO2 ARTERIAL: 24 MMOL/L
TCO2 ARTERIAL: 42.5 MMOL/L
TCO2 ARTERIAL: 43.6 MMOL/L
TCO2 ARTERIAL: 46.5 MMOL/L
TCO2 ARTERIAL: 47.7 MMOL/L
TCO2 ARTERIAL: 49.6 MMOL/L
TCO2 ARTERIAL: 49.6 MMOL/L
TCO2 ARTERIAL: 56.6 MMOL/L
TCO2 ARTERIAL: 61.3 MMOL/L
TOTAL PROTEIN: 4.6 G/DL (ref 6.4–8.2)
TOTAL PROTEIN: 4.8 G/DL (ref 6.4–8.2)
TOTAL PROTEIN: 5 G/DL (ref 6.4–8.2)
TOTAL PROTEIN: 5.3 G/DL (ref 6.4–8.2)
TOTAL PROTEIN: 5.4 G/DL (ref 6.4–8.2)
TOTAL PROTEIN: 5.5 G/DL (ref 6.4–8.2)
TOTAL PROTEIN: 5.6 G/DL (ref 6.4–8.2)
TOTAL PROTEIN: 5.7 G/DL (ref 6.4–8.2)
TOTAL PROTEIN: 5.8 G/DL (ref 6.4–8.2)
TOTAL PROTEIN: 5.9 G/DL (ref 6.4–8.2)
TOTAL PROTEIN: 5.9 G/DL (ref 6.4–8.2)
TOTAL PROTEIN: 6.1 G/DL (ref 6.4–8.2)
TOTAL PROTEIN: 6.1 G/DL (ref 6.4–8.2)
TOTAL PROTEIN: 6.2 G/DL (ref 6.4–8.2)
TOTAL PROTEIN: 6.2 G/DL (ref 6.4–8.2)
TOTAL PROTEIN: 6.3 G/DL (ref 6.4–8.2)
TOTAL PROTEIN: 6.3 G/DL (ref 6.4–8.2)
TOTAL PROTEIN: 6.4 G/DL (ref 6.4–8.2)
TOTAL PROTEIN: 6.6 G/DL (ref 6.4–8.2)
TOTAL PROTEIN: 7.6 G/DL (ref 6.4–8.2)
TRIGL SERPL-MCNC: 107 MG/DL (ref 0–150)
TRIGL SERPL-MCNC: 182 MG/DL (ref 0–150)
TROPONIN: 0.01 NG/ML
TROPONIN: <0.01 NG/ML
TSH REFLEX: 2.43 UIU/ML (ref 0.27–4.2)
TUBE NUMBER CSF: ABNORMAL
TUBE NUMBER CSF: NORMAL
URINE CULTURE, ROUTINE: ABNORMAL
URINE CULTURE, ROUTINE: NORMAL
URINE CULTURE, ROUTINE: NORMAL
URINE REFLEX TO CULTURE: NORMAL
URINE REFLEX TO CULTURE: YES
URINE TYPE: ABNORMAL
URINE TYPE: NORMAL
URINE TYPE: NORMAL
UROBILINOGEN, URINE: 0.2 E.U./DL
VACUOLATED NEUTROPHILS: PRESENT
VANCOMYCIN TROUGH: 18 UG/ML (ref 10–20)
VARICELLA-ZOSTER, PCR: NOT DETECTED
VDRL CSF SCREEN: NON REACTIVE
VZ SOURCE: NORMAL
WBC # BLD: 10 K/UL (ref 4–11)
WBC # BLD: 10.1 K/UL (ref 4–11)
WBC # BLD: 10.7 K/UL (ref 4–11)
WBC # BLD: 10.7 K/UL (ref 4–11)
WBC # BLD: 10.8 K/UL (ref 4–11)
WBC # BLD: 11.4 K/UL (ref 4–11)
WBC # BLD: 11.8 K/UL (ref 4–11)
WBC # BLD: 13.1 K/UL (ref 4–11)
WBC # BLD: 13.8 K/UL (ref 4–11)
WBC # BLD: 17.2 K/UL (ref 4–11)
WBC # BLD: 17.3 K/UL (ref 4–11)
WBC # BLD: 19.8 K/UL (ref 4–11)
WBC # BLD: 3.9 K/UL (ref 4–11)
WBC # BLD: 5.7 K/UL (ref 4–11)
WBC # BLD: 5.8 K/UL (ref 4–11)
WBC # BLD: 6 K/UL (ref 4–11)
WBC # BLD: 6.1 K/UL (ref 4–11)
WBC # BLD: 6.5 K/UL (ref 4–11)
WBC # BLD: 7.5 K/UL (ref 4–11)
WBC # BLD: 7.9 K/UL (ref 4–11)
WBC # BLD: 8.1 K/UL (ref 4–11)
WBC # BLD: 8.1 K/UL (ref 4–11)
WBC # BLD: 8.3 K/UL (ref 4–11)
WBC # BLD: 8.3 K/UL (ref 4–11)
WBC # BLD: 8.4 K/UL (ref 4–11)
WBC # BLD: 8.7 K/UL (ref 4–11)
WBC # BLD: 9 K/UL (ref 4–11)
WBC # BLD: 9 K/UL (ref 4–11)
WBC # BLD: 9.1 K/UL (ref 4–11)
WBC # BLD: 9.3 K/UL (ref 4–11)
WBC # BLD: 9.8 K/UL (ref 4–11)
WBC # BLD: 9.9 K/UL (ref 4–11)
WBC CSF: 0 /CUMM (ref 0–5)
WBC UA: 9 /HPF (ref 0–5)
WOUND/ABSCESS: ABNORMAL
YEAST: PRESENT /HPF

## 2019-01-01 PROCEDURE — 6360000002 HC RX W HCPCS: Performed by: INTERNAL MEDICINE

## 2019-01-01 PROCEDURE — 80053 COMPREHEN METABOLIC PANEL: CPT

## 2019-01-01 PROCEDURE — 94761 N-INVAS EAR/PLS OXIMETRY MLT: CPT

## 2019-01-01 PROCEDURE — 99233 SBSQ HOSP IP/OBS HIGH 50: CPT | Performed by: INTERNAL MEDICINE

## 2019-01-01 PROCEDURE — 94750 HC PULMONARY COMPLIANCE STUDY: CPT

## 2019-01-01 PROCEDURE — 99222 1ST HOSP IP/OBS MODERATE 55: CPT | Performed by: SURGERY

## 2019-01-01 PROCEDURE — 6370000000 HC RX 637 (ALT 250 FOR IP): Performed by: SURGERY

## 2019-01-01 PROCEDURE — 84145 PROCALCITONIN (PCT): CPT

## 2019-01-01 PROCEDURE — 2580000003 HC RX 258: Performed by: SURGERY

## 2019-01-01 PROCEDURE — 99232 SBSQ HOSP IP/OBS MODERATE 35: CPT | Performed by: SURGERY

## 2019-01-01 PROCEDURE — 82330 ASSAY OF CALCIUM: CPT

## 2019-01-01 PROCEDURE — 2500000003 HC RX 250 WO HCPCS: Performed by: HOSPITALIST

## 2019-01-01 PROCEDURE — 2580000003 HC RX 258: Performed by: INTERNAL MEDICINE

## 2019-01-01 PROCEDURE — 71045 X-RAY EXAM CHEST 1 VIEW: CPT

## 2019-01-01 PROCEDURE — 85610 PROTHROMBIN TIME: CPT

## 2019-01-01 PROCEDURE — 99231 SBSQ HOSP IP/OBS SF/LOW 25: CPT | Performed by: SURGERY

## 2019-01-01 PROCEDURE — 6360000002 HC RX W HCPCS: Performed by: EMERGENCY MEDICINE

## 2019-01-01 PROCEDURE — 2700000000 HC OXYGEN THERAPY PER DAY

## 2019-01-01 PROCEDURE — 83735 ASSAY OF MAGNESIUM: CPT

## 2019-01-01 PROCEDURE — 84484 ASSAY OF TROPONIN QUANT: CPT

## 2019-01-01 PROCEDURE — 4500000026 HC ED CRITICAL CARE PROCEDURE

## 2019-01-01 PROCEDURE — 84478 ASSAY OF TRIGLYCERIDES: CPT

## 2019-01-01 PROCEDURE — 2580000003 HC RX 258: Performed by: NURSE ANESTHETIST, CERTIFIED REGISTERED

## 2019-01-01 PROCEDURE — 36592 COLLECT BLOOD FROM PICC: CPT

## 2019-01-01 PROCEDURE — 99291 CRITICAL CARE FIRST HOUR: CPT | Performed by: INTERNAL MEDICINE

## 2019-01-01 PROCEDURE — 2500000003 HC RX 250 WO HCPCS: Performed by: SURGERY

## 2019-01-01 PROCEDURE — 87186 SC STD MICRODIL/AGAR DIL: CPT

## 2019-01-01 PROCEDURE — 85730 THROMBOPLASTIN TIME PARTIAL: CPT

## 2019-01-01 PROCEDURE — 2500000003 HC RX 250 WO HCPCS: Performed by: INTERNAL MEDICINE

## 2019-01-01 PROCEDURE — 84100 ASSAY OF PHOSPHORUS: CPT

## 2019-01-01 PROCEDURE — 94003 VENT MGMT INPAT SUBQ DAY: CPT

## 2019-01-01 PROCEDURE — 2580000003 HC RX 258: Performed by: HOSPITALIST

## 2019-01-01 PROCEDURE — 84295 ASSAY OF SERUM SODIUM: CPT

## 2019-01-01 PROCEDURE — 6370000000 HC RX 637 (ALT 250 FOR IP): Performed by: HOSPITALIST

## 2019-01-01 PROCEDURE — 94770 HC ETCO2 MONITOR DAILY: CPT

## 2019-01-01 PROCEDURE — 99231 SBSQ HOSP IP/OBS SF/LOW 25: CPT | Performed by: INTERNAL MEDICINE

## 2019-01-01 PROCEDURE — G8417 CALC BMI ABV UP PARAM F/U: HCPCS | Performed by: INTERNAL MEDICINE

## 2019-01-01 PROCEDURE — 85027 COMPLETE CBC AUTOMATED: CPT

## 2019-01-01 PROCEDURE — 83690 ASSAY OF LIPASE: CPT

## 2019-01-01 PROCEDURE — 2500000003 HC RX 250 WO HCPCS: Performed by: EMERGENCY MEDICINE

## 2019-01-01 PROCEDURE — 6360000002 HC RX W HCPCS: Performed by: SURGERY

## 2019-01-01 PROCEDURE — 87449 NOS EACH ORGANISM AG IA: CPT

## 2019-01-01 PROCEDURE — 82945 GLUCOSE OTHER FLUID: CPT

## 2019-01-01 PROCEDURE — 6370000000 HC RX 637 (ALT 250 FOR IP): Performed by: NURSE PRACTITIONER

## 2019-01-01 PROCEDURE — 2000000000 HC ICU R&B

## 2019-01-01 PROCEDURE — APPSS15 APP SPLIT SHARED TIME 0-15 MINUTES: Performed by: NURSE PRACTITIONER

## 2019-01-01 PROCEDURE — 94640 AIRWAY INHALATION TREATMENT: CPT

## 2019-01-01 PROCEDURE — 99232 SBSQ HOSP IP/OBS MODERATE 35: CPT | Performed by: NURSE PRACTITIONER

## 2019-01-01 PROCEDURE — 6360000002 HC RX W HCPCS: Performed by: NURSE PRACTITIONER

## 2019-01-01 PROCEDURE — 2709999900 HC NON-CHARGEABLE SUPPLY: Performed by: INTERNAL MEDICINE

## 2019-01-01 PROCEDURE — 99292 CRITICAL CARE ADDL 30 MIN: CPT

## 2019-01-01 PROCEDURE — 83935 ASSAY OF URINE OSMOLALITY: CPT

## 2019-01-01 PROCEDURE — 70450 CT HEAD/BRAIN W/O DYE: CPT

## 2019-01-01 PROCEDURE — 83880 ASSAY OF NATRIURETIC PEPTIDE: CPT

## 2019-01-01 PROCEDURE — 2060000000 HC ICU INTERMEDIATE R&B

## 2019-01-01 PROCEDURE — 6370000000 HC RX 637 (ALT 250 FOR IP): Performed by: INTERNAL MEDICINE

## 2019-01-01 PROCEDURE — APPNB30 APP NON BILLABLE TIME 0-30 MINS: Performed by: NURSE PRACTITIONER

## 2019-01-01 PROCEDURE — 49424 ASSESS CYST CONTRAST INJECT: CPT

## 2019-01-01 PROCEDURE — 0BH18EZ INSERTION OF ENDOTRACHEAL AIRWAY INTO TRACHEA, VIA NATURAL OR ARTIFICIAL OPENING ENDOSCOPIC: ICD-10-PCS | Performed by: EMERGENCY MEDICINE

## 2019-01-01 PROCEDURE — 96361 HYDRATE IV INFUSION ADD-ON: CPT

## 2019-01-01 PROCEDURE — 85025 COMPLETE CBC W/AUTO DIFF WBC: CPT

## 2019-01-01 PROCEDURE — 2580000003 HC RX 258: Performed by: EMERGENCY MEDICINE

## 2019-01-01 PROCEDURE — 74177 CT ABD & PELVIS W/CONTRAST: CPT

## 2019-01-01 PROCEDURE — 99291 CRITICAL CARE FIRST HOUR: CPT

## 2019-01-01 PROCEDURE — 1036F TOBACCO NON-USER: CPT | Performed by: INTERNAL MEDICINE

## 2019-01-01 PROCEDURE — 1200000000 HC SEMI PRIVATE

## 2019-01-01 PROCEDURE — 87798 DETECT AGENT NOS DNA AMP: CPT

## 2019-01-01 PROCEDURE — 96365 THER/PROPH/DIAG IV INF INIT: CPT

## 2019-01-01 PROCEDURE — 72125 CT NECK SPINE W/O DYE: CPT

## 2019-01-01 PROCEDURE — 86592 SYPHILIS TEST NON-TREP QUAL: CPT

## 2019-01-01 PROCEDURE — 2580000003 HC RX 258: Performed by: NURSE PRACTITIONER

## 2019-01-01 PROCEDURE — 2580000003 HC RX 258

## 2019-01-01 PROCEDURE — 85014 HEMATOCRIT: CPT

## 2019-01-01 PROCEDURE — 77003 FLUOROGUIDE FOR SPINE INJECT: CPT

## 2019-01-01 PROCEDURE — 95819 EEG AWAKE AND ASLEEP: CPT | Performed by: PSYCHIATRY & NEUROLOGY

## 2019-01-01 PROCEDURE — 36415 COLL VENOUS BLD VENIPUNCTURE: CPT

## 2019-01-01 PROCEDURE — 2500000003 HC RX 250 WO HCPCS

## 2019-01-01 PROCEDURE — 6370000000 HC RX 637 (ALT 250 FOR IP): Performed by: PHYSICIAN ASSISTANT

## 2019-01-01 PROCEDURE — 81001 URINALYSIS AUTO W/SCOPE: CPT

## 2019-01-01 PROCEDURE — 82803 BLOOD GASES ANY COMBINATION: CPT

## 2019-01-01 PROCEDURE — 99233 SBSQ HOSP IP/OBS HIGH 50: CPT | Performed by: NURSE PRACTITIONER

## 2019-01-01 PROCEDURE — 99211 OFF/OP EST MAY X REQ PHY/QHP: CPT

## 2019-01-01 PROCEDURE — 7100000000 HC PACU RECOVERY - FIRST 15 MIN: Performed by: SURGERY

## 2019-01-01 PROCEDURE — 2709999900 HC NON-CHARGEABLE SUPPLY: Performed by: SURGERY

## 2019-01-01 PROCEDURE — 85018 HEMOGLOBIN: CPT

## 2019-01-01 PROCEDURE — 99223 1ST HOSP IP/OBS HIGH 75: CPT | Performed by: INTERNAL MEDICINE

## 2019-01-01 PROCEDURE — 36569 INSJ PICC 5 YR+ W/O IMAGING: CPT

## 2019-01-01 PROCEDURE — 87070 CULTURE OTHR SPECIMN AEROBIC: CPT

## 2019-01-01 PROCEDURE — 1123F ACP DISCUSS/DSCN MKR DOCD: CPT | Performed by: INTERNAL MEDICINE

## 2019-01-01 PROCEDURE — C1751 CATH, INF, PER/CENT/MIDLINE: HCPCS

## 2019-01-01 PROCEDURE — 1090F PRES/ABSN URINE INCON ASSESS: CPT | Performed by: INTERNAL MEDICINE

## 2019-01-01 PROCEDURE — 99232 SBSQ HOSP IP/OBS MODERATE 35: CPT | Performed by: INTERNAL MEDICINE

## 2019-01-01 PROCEDURE — 73080 X-RAY EXAM OF ELBOW: CPT

## 2019-01-01 PROCEDURE — 6360000002 HC RX W HCPCS: Performed by: HOSPITALIST

## 2019-01-01 PROCEDURE — 3700000000 HC ANESTHESIA ATTENDED CARE: Performed by: SURGERY

## 2019-01-01 PROCEDURE — 81003 URINALYSIS AUTO W/O SCOPE: CPT

## 2019-01-01 PROCEDURE — 3600000002 HC SURGERY LEVEL 2 BASE: Performed by: SURGERY

## 2019-01-01 PROCEDURE — 87483 CNS DNA AMP PROBE TYPE 12-25: CPT

## 2019-01-01 PROCEDURE — BF130ZZ FLUOROSCOPY OF GALLBLADDER AND BILE DUCTS USING HIGH OSMOLAR CONTRAST: ICD-10-PCS | Performed by: RADIOLOGY

## 2019-01-01 PROCEDURE — 71260 CT THORAX DX C+: CPT

## 2019-01-01 PROCEDURE — 96375 TX/PRO/DX INJ NEW DRUG ADDON: CPT

## 2019-01-01 PROCEDURE — 99222 1ST HOSP IP/OBS MODERATE 55: CPT | Performed by: INTERNAL MEDICINE

## 2019-01-01 PROCEDURE — 6370000000 HC RX 637 (ALT 250 FOR IP): Performed by: PSYCHIATRY & NEUROLOGY

## 2019-01-01 PROCEDURE — 3700000001 HC ADD 15 MINUTES (ANESTHESIA): Performed by: INTERNAL MEDICINE

## 2019-01-01 PROCEDURE — 93010 ELECTROCARDIOGRAM REPORT: CPT | Performed by: INTERNAL MEDICINE

## 2019-01-01 PROCEDURE — 80048 BASIC METABOLIC PNL TOTAL CA: CPT

## 2019-01-01 PROCEDURE — 3700000000 HC ANESTHESIA ATTENDED CARE: Performed by: INTERNAL MEDICINE

## 2019-01-01 PROCEDURE — 80202 ASSAY OF VANCOMYCIN: CPT

## 2019-01-01 PROCEDURE — 83605 ASSAY OF LACTIC ACID: CPT

## 2019-01-01 PROCEDURE — 02HV33Z INSERTION OF INFUSION DEVICE INTO SUPERIOR VENA CAVA, PERCUTANEOUS APPROACH: ICD-10-PCS | Performed by: INTERNAL MEDICINE

## 2019-01-01 PROCEDURE — 89050 BODY FLUID CELL COUNT: CPT

## 2019-01-01 PROCEDURE — 93005 ELECTROCARDIOGRAM TRACING: CPT | Performed by: EMERGENCY MEDICINE

## 2019-01-01 PROCEDURE — 99285 EMERGENCY DEPT VISIT HI MDM: CPT

## 2019-01-01 PROCEDURE — 87040 BLOOD CULTURE FOR BACTERIA: CPT

## 2019-01-01 PROCEDURE — 99233 SBSQ HOSP IP/OBS HIGH 50: CPT | Performed by: PSYCHIATRY & NEUROLOGY

## 2019-01-01 PROCEDURE — 95819 EEG AWAKE AND ASLEEP: CPT

## 2019-01-01 PROCEDURE — 88112 CYTOPATH CELL ENHANCE TECH: CPT

## 2019-01-01 PROCEDURE — 93306 TTE W/DOPPLER COMPLETE: CPT

## 2019-01-01 PROCEDURE — 94002 VENT MGMT INPAT INIT DAY: CPT

## 2019-01-01 PROCEDURE — 6360000004 HC RX CONTRAST MEDICATION: Performed by: SURGERY

## 2019-01-01 PROCEDURE — 99024 POSTOP FOLLOW-UP VISIT: CPT | Performed by: SURGERY

## 2019-01-01 PROCEDURE — G8427 DOCREV CUR MEDS BY ELIG CLIN: HCPCS | Performed by: INTERNAL MEDICINE

## 2019-01-01 PROCEDURE — 75989 ABSCESS DRAINAGE UNDER X-RAY: CPT

## 2019-01-01 PROCEDURE — 86341 ISLET CELL ANTIBODY: CPT

## 2019-01-01 PROCEDURE — 87075 CULTR BACTERIA EXCEPT BLOOD: CPT

## 2019-01-01 PROCEDURE — 87103 BLOOD FUNGUS CULTURE: CPT

## 2019-01-01 PROCEDURE — 87205 SMEAR GRAM STAIN: CPT

## 2019-01-01 PROCEDURE — 36592 COLLECT BLOOD FROM PICC: CPT | Performed by: NURSE PRACTITIONER

## 2019-01-01 PROCEDURE — 93005 ELECTROCARDIOGRAM TRACING: CPT | Performed by: NURSE PRACTITIONER

## 2019-01-01 PROCEDURE — 84300 ASSAY OF URINE SODIUM: CPT

## 2019-01-01 PROCEDURE — 0DH63UZ INSERTION OF FEEDING DEVICE INTO STOMACH, PERCUTANEOUS APPROACH: ICD-10-PCS | Performed by: INTERNAL MEDICINE

## 2019-01-01 PROCEDURE — 5A1955Z RESPIRATORY VENTILATION, GREATER THAN 96 CONSECUTIVE HOURS: ICD-10-PCS | Performed by: EMERGENCY MEDICINE

## 2019-01-01 PROCEDURE — 85049 AUTOMATED PLATELET COUNT: CPT

## 2019-01-01 PROCEDURE — 3609013300 HC EGD TUBE PLACEMENT: Performed by: INTERNAL MEDICINE

## 2019-01-01 PROCEDURE — 0F9430Z DRAINAGE OF GALLBLADDER WITH DRAINAGE DEVICE, PERCUTANEOUS APPROACH: ICD-10-PCS | Performed by: RADIOLOGY

## 2019-01-01 PROCEDURE — 43762 RPLC GTUBE NO REVJ TRC: CPT

## 2019-01-01 PROCEDURE — 99214 OFFICE O/P EST MOD 30 MIN: CPT | Performed by: INTERNAL MEDICINE

## 2019-01-01 PROCEDURE — 84133 ASSAY OF URINE POTASSIUM: CPT

## 2019-01-01 PROCEDURE — 009U3ZX DRAINAGE OF SPINAL CANAL, PERCUTANEOUS APPROACH, DIAGNOSTIC: ICD-10-PCS | Performed by: RADIOLOGY

## 2019-01-01 PROCEDURE — 76080 X-RAY EXAM OF FISTULA: CPT

## 2019-01-01 PROCEDURE — 2500000003 HC RX 250 WO HCPCS: Performed by: NURSE ANESTHETIST, CERTIFIED REGISTERED

## 2019-01-01 PROCEDURE — G8400 PT W/DXA NO RESULTS DOC: HCPCS | Performed by: INTERNAL MEDICINE

## 2019-01-01 PROCEDURE — 93000 ELECTROCARDIOGRAM COMPLETE: CPT | Performed by: INTERNAL MEDICINE

## 2019-01-01 PROCEDURE — 99212 OFFICE O/P EST SF 10 MIN: CPT

## 2019-01-01 PROCEDURE — 99284 EMERGENCY DEPT VISIT MOD MDM: CPT

## 2019-01-01 PROCEDURE — 7100000001 HC PACU RECOVERY - ADDTL 15 MIN: Performed by: SURGERY

## 2019-01-01 PROCEDURE — 6360000002 HC RX W HCPCS

## 2019-01-01 PROCEDURE — APPSS45 APP SPLIT SHARED TIME 31-45 MINUTES: Performed by: NURSE PRACTITIONER

## 2019-01-01 PROCEDURE — 02HV33Z INSERTION OF INFUSION DEVICE INTO SUPERIOR VENA CAVA, PERCUTANEOUS APPROACH: ICD-10-PCS | Performed by: EMERGENCY MEDICINE

## 2019-01-01 PROCEDURE — 6360000004 HC RX CONTRAST MEDICATION

## 2019-01-01 PROCEDURE — 84157 ASSAY OF PROTEIN OTHER: CPT

## 2019-01-01 PROCEDURE — 93922 UPR/L XTREMITY ART 2 LEVELS: CPT

## 2019-01-01 PROCEDURE — 3600000012 HC SURGERY LEVEL 2 ADDTL 15MIN: Performed by: SURGERY

## 2019-01-01 PROCEDURE — 0B110F4 BYPASS TRACHEA TO CUTANEOUS WITH TRACHEOSTOMY DEVICE, OPEN APPROACH: ICD-10-PCS | Performed by: SURGERY

## 2019-01-01 PROCEDURE — 87077 CULTURE AEROBIC IDENTIFY: CPT

## 2019-01-01 PROCEDURE — 51701 INSERT BLADDER CATHETER: CPT

## 2019-01-01 PROCEDURE — 6360000002 HC RX W HCPCS: Performed by: NURSE ANESTHETIST, CERTIFIED REGISTERED

## 2019-01-01 PROCEDURE — 80162 ASSAY OF DIGOXIN TOTAL: CPT

## 2019-01-01 PROCEDURE — 94003 VENT MGMT INPAT SUBQ DAY: CPT | Performed by: INTERNAL MEDICINE

## 2019-01-01 PROCEDURE — 84443 ASSAY THYROID STIM HORMONE: CPT

## 2019-01-01 PROCEDURE — 87086 URINE CULTURE/COLONY COUNT: CPT

## 2019-01-01 PROCEDURE — 86255 FLUORESCENT ANTIBODY SCREEN: CPT

## 2019-01-01 PROCEDURE — 6360000004 HC RX CONTRAST MEDICATION: Performed by: INTERNAL MEDICINE

## 2019-01-01 PROCEDURE — 87150 DNA/RNA AMPLIFIED PROBE: CPT

## 2019-01-01 PROCEDURE — 2720000010 HC SURG SUPPLY STERILE: Performed by: SURGERY

## 2019-01-01 PROCEDURE — 87102 FUNGUS ISOLATION CULTURE: CPT

## 2019-01-01 PROCEDURE — 84132 ASSAY OF SERUM POTASSIUM: CPT

## 2019-01-01 PROCEDURE — 87529 HSV DNA AMP PROBE: CPT

## 2019-01-01 PROCEDURE — 99223 1ST HOSP IP/OBS HIGH 75: CPT | Performed by: PSYCHIATRY & NEUROLOGY

## 2019-01-01 PROCEDURE — 51702 INSERT TEMP BLADDER CATH: CPT

## 2019-01-01 PROCEDURE — 6360000004 HC RX CONTRAST MEDICATION: Performed by: NURSE PRACTITIONER

## 2019-01-01 PROCEDURE — 62270 DX LMBR SPI PNXR: CPT

## 2019-01-01 PROCEDURE — 3700000001 HC ADD 15 MINUTES (ANESTHESIA): Performed by: SURGERY

## 2019-01-01 PROCEDURE — 94760 N-INVAS EAR/PLS OXIMETRY 1: CPT

## 2019-01-01 PROCEDURE — 6360000004 HC RX CONTRAST MEDICATION: Performed by: HOSPITALIST

## 2019-01-01 PROCEDURE — 31600 PLANNED TRACHEOSTOMY: CPT | Performed by: SURGERY

## 2019-01-01 PROCEDURE — 4040F PNEUMOC VAC/ADMIN/RCVD: CPT | Performed by: INTERNAL MEDICINE

## 2019-01-01 PROCEDURE — 74176 CT ABD & PELVIS W/O CONTRAST: CPT

## 2019-01-01 RX ORDER — HALOPERIDOL 5 MG/ML
3 INJECTION INTRAMUSCULAR EVERY 4 HOURS PRN
Status: DISCONTINUED | OUTPATIENT
Start: 2019-01-01 | End: 2019-01-01 | Stop reason: HOSPADM

## 2019-01-01 RX ORDER — OXYCODONE HYDROCHLORIDE 15 MG/1
7.5 TABLET ORAL NIGHTLY
Status: ON HOLD | COMMUNITY
End: 2019-01-01 | Stop reason: HOSPADM

## 2019-01-01 RX ORDER — 0.9 % SODIUM CHLORIDE 0.9 %
1000 INTRAVENOUS SOLUTION INTRAVENOUS ONCE
Status: DISCONTINUED | OUTPATIENT
Start: 2019-01-01 | End: 2019-01-01

## 2019-01-01 RX ORDER — POLYETHYLENE GLYCOL 3350 17 G/17G
17 POWDER, FOR SOLUTION ORAL DAILY PRN
COMMUNITY

## 2019-01-01 RX ORDER — OXYCODONE HYDROCHLORIDE AND ACETAMINOPHEN 5; 325 MG/1; MG/1
1 TABLET ORAL EVERY 8 HOURS PRN
Status: ON HOLD | COMMUNITY
End: 2019-01-01

## 2019-01-01 RX ORDER — SODIUM CHLORIDE 9 MG/ML
INJECTION, SOLUTION INTRAVENOUS
Status: COMPLETED
Start: 2019-01-01 | End: 2019-01-01

## 2019-01-01 RX ORDER — RISPERIDONE 0.5 MG/1
0.25 TABLET, FILM COATED ORAL 2 TIMES DAILY
Status: DISCONTINUED | OUTPATIENT
Start: 2019-01-01 | End: 2019-01-01

## 2019-01-01 RX ORDER — SCOLOPAMINE TRANSDERMAL SYSTEM 1 MG/1
1 PATCH, EXTENDED RELEASE TRANSDERMAL
Status: DISCONTINUED | OUTPATIENT
Start: 2019-01-01 | End: 2019-01-01 | Stop reason: HOSPADM

## 2019-01-01 RX ORDER — LEVOFLOXACIN 5 MG/ML
500 INJECTION, SOLUTION INTRAVENOUS EVERY 24 HOURS
Status: DISCONTINUED | OUTPATIENT
Start: 2019-01-01 | End: 2019-01-01

## 2019-01-01 RX ORDER — ETOMIDATE 2 MG/ML
INJECTION INTRAVENOUS DAILY PRN
Status: COMPLETED | OUTPATIENT
Start: 2019-01-01 | End: 2019-01-01

## 2019-01-01 RX ORDER — SODIUM CHLORIDE 0.9 % (FLUSH) 0.9 %
10 SYRINGE (ML) INJECTION PRN
Status: DISCONTINUED | OUTPATIENT
Start: 2019-01-01 | End: 2019-01-01

## 2019-01-01 RX ORDER — NALOXONE HYDROCHLORIDE 1 MG/ML
1 INJECTION INTRAMUSCULAR; INTRAVENOUS; SUBCUTANEOUS ONCE
Status: COMPLETED | OUTPATIENT
Start: 2019-01-01 | End: 2019-01-01

## 2019-01-01 RX ORDER — POTASSIUM CHLORIDE 29.8 MG/ML
20 INJECTION INTRAVENOUS PRN
Status: DISCONTINUED | OUTPATIENT
Start: 2019-01-01 | End: 2019-01-01 | Stop reason: HOSPADM

## 2019-01-01 RX ORDER — SODIUM CHLORIDE 0.9 % (FLUSH) 0.9 %
10 SYRINGE (ML) INJECTION EVERY 12 HOURS SCHEDULED
Status: DISCONTINUED | OUTPATIENT
Start: 2019-01-01 | End: 2019-01-01 | Stop reason: HOSPADM

## 2019-01-01 RX ORDER — VENLAFAXINE HYDROCHLORIDE 150 MG/1
150 CAPSULE, EXTENDED RELEASE ORAL
Status: DISCONTINUED | OUTPATIENT
Start: 2019-01-01 | End: 2019-01-01 | Stop reason: HOSPADM

## 2019-01-01 RX ORDER — CIPROFLOXACIN HYDROCHLORIDE 3.5 MG/ML
1 SOLUTION/ DROPS TOPICAL EVERY 6 HOURS SCHEDULED
Status: DISCONTINUED | OUTPATIENT
Start: 2019-01-01 | End: 2019-01-01

## 2019-01-01 RX ORDER — SODIUM CHLORIDE 0.9 % (FLUSH) 0.9 %
10 SYRINGE (ML) INJECTION EVERY 12 HOURS SCHEDULED
Status: DISCONTINUED | OUTPATIENT
Start: 2019-01-01 | End: 2019-01-01 | Stop reason: SDUPTHER

## 2019-01-01 RX ORDER — HEPARIN SODIUM 1000 [USP'U]/ML
40 INJECTION, SOLUTION INTRAVENOUS; SUBCUTANEOUS PRN
Status: DISCONTINUED | OUTPATIENT
Start: 2019-01-01 | End: 2019-01-01 | Stop reason: SDUPTHER

## 2019-01-01 RX ORDER — LORAZEPAM 0.5 MG/1
0.5 TABLET ORAL EVERY 6 HOURS PRN
Qty: 20 TABLET | Refills: 0 | Status: SHIPPED | OUTPATIENT
Start: 2019-01-01 | End: 2019-01-01

## 2019-01-01 RX ORDER — ALBUTEROL SULFATE 90 UG/1
6 AEROSOL, METERED RESPIRATORY (INHALATION) EVERY 4 HOURS PRN
Status: DISCONTINUED | OUTPATIENT
Start: 2019-01-01 | End: 2019-01-01 | Stop reason: HOSPADM

## 2019-01-01 RX ORDER — DEXTROSE MONOHYDRATE 50 MG/ML
100 INJECTION, SOLUTION INTRAVENOUS PRN
Status: DISCONTINUED | OUTPATIENT
Start: 2019-01-01 | End: 2019-01-01 | Stop reason: HOSPADM

## 2019-01-01 RX ORDER — FENTANYL CITRATE 50 UG/ML
25 INJECTION, SOLUTION INTRAMUSCULAR; INTRAVENOUS ONCE
Status: COMPLETED | OUTPATIENT
Start: 2019-01-01 | End: 2019-01-01

## 2019-01-01 RX ORDER — LIDOCAINE HYDROCHLORIDE 10 MG/ML
5 INJECTION, SOLUTION EPIDURAL; INFILTRATION; INTRACAUDAL; PERINEURAL ONCE
Status: DISCONTINUED | OUTPATIENT
Start: 2019-01-01 | End: 2019-01-01

## 2019-01-01 RX ORDER — KETAMINE HCL IN NACL, ISO-OSM 100MG/10ML
5 SYRINGE (ML) INJECTION ONCE
Status: COMPLETED | OUTPATIENT
Start: 2019-01-01 | End: 2019-01-01

## 2019-01-01 RX ORDER — PROPRANOLOL HCL 60 MG
120 CAPSULE, EXTENDED RELEASE 24HR ORAL DAILY
Status: DISCONTINUED | OUTPATIENT
Start: 2019-01-01 | End: 2019-01-01

## 2019-01-01 RX ORDER — MAGNESIUM SULFATE 1 G/100ML
1 INJECTION INTRAVENOUS PRN
Status: DISCONTINUED | OUTPATIENT
Start: 2019-01-01 | End: 2019-01-01 | Stop reason: HOSPADM

## 2019-01-01 RX ORDER — SODIUM CHLORIDE 0.9 % (FLUSH) 0.9 %
10 SYRINGE (ML) INJECTION PRN
Status: DISCONTINUED | OUTPATIENT
Start: 2019-01-01 | End: 2019-01-01 | Stop reason: HOSPADM

## 2019-01-01 RX ORDER — QUETIAPINE FUMARATE 25 MG/1
12.5 TABLET, FILM COATED ORAL EVERY 6 HOURS PRN
Status: ON HOLD | COMMUNITY
End: 2019-01-01 | Stop reason: HOSPADM

## 2019-01-01 RX ORDER — 0.9 % SODIUM CHLORIDE 0.9 %
10 VIAL (ML) INJECTION PRN
Status: DISCONTINUED | OUTPATIENT
Start: 2019-01-01 | End: 2019-01-01 | Stop reason: SDUPTHER

## 2019-01-01 RX ORDER — OXYCODONE HYDROCHLORIDE 5 MG/1
5 TABLET ORAL 3 TIMES DAILY
Status: DISCONTINUED | OUTPATIENT
Start: 2019-01-01 | End: 2019-01-01 | Stop reason: HOSPADM

## 2019-01-01 RX ORDER — DEXTROSE MONOHYDRATE 25 G/50ML
12.5 INJECTION, SOLUTION INTRAVENOUS ONCE
Status: COMPLETED | OUTPATIENT
Start: 2019-01-01 | End: 2019-01-01

## 2019-01-01 RX ORDER — WARFARIN SODIUM 2 MG/1
2 TABLET ORAL
Status: DISCONTINUED | OUTPATIENT
Start: 2019-01-01 | End: 2019-01-01 | Stop reason: HOSPADM

## 2019-01-01 RX ORDER — VENLAFAXINE HYDROCHLORIDE 150 MG/1
150 CAPSULE, EXTENDED RELEASE ORAL DAILY
Qty: 30 CAPSULE | Refills: 0 | Status: SHIPPED | OUTPATIENT
Start: 2019-01-01

## 2019-01-01 RX ORDER — TOPIRAMATE 25 MG/1
50 TABLET ORAL 2 TIMES DAILY
Status: DISCONTINUED | OUTPATIENT
Start: 2019-01-01 | End: 2019-01-01 | Stop reason: HOSPADM

## 2019-01-01 RX ORDER — FUROSEMIDE 10 MG/ML
20 INJECTION INTRAMUSCULAR; INTRAVENOUS ONCE
Status: DISCONTINUED | OUTPATIENT
Start: 2019-01-01 | End: 2019-01-01

## 2019-01-01 RX ORDER — HEPARIN SODIUM 1000 [USP'U]/ML
40 INJECTION, SOLUTION INTRAVENOUS; SUBCUTANEOUS PRN
Status: DISCONTINUED | OUTPATIENT
Start: 2019-01-01 | End: 2019-01-01

## 2019-01-01 RX ORDER — ONDANSETRON 4 MG/1
4 TABLET, ORALLY DISINTEGRATING ORAL EVERY 8 HOURS PRN
Qty: 30 TABLET | Refills: 0 | Status: SHIPPED | OUTPATIENT
Start: 2019-01-01

## 2019-01-01 RX ORDER — OXYCODONE HYDROCHLORIDE 5 MG/1
10 TABLET ORAL 3 TIMES DAILY
Status: DISCONTINUED | OUTPATIENT
Start: 2019-01-01 | End: 2019-01-01 | Stop reason: HOSPADM

## 2019-01-01 RX ORDER — MAGNESIUM SULFATE IN WATER 40 MG/ML
2 INJECTION, SOLUTION INTRAVENOUS ONCE
Status: COMPLETED | OUTPATIENT
Start: 2019-01-01 | End: 2019-01-01

## 2019-01-01 RX ORDER — MAGNESIUM HYDROXIDE 1200 MG/15ML
LIQUID ORAL CONTINUOUS PRN
Status: COMPLETED | OUTPATIENT
Start: 2019-01-01 | End: 2019-01-01

## 2019-01-01 RX ORDER — LORAZEPAM 0.5 MG/1
0.5 TABLET ORAL EVERY 6 HOURS PRN
Status: DISCONTINUED | OUTPATIENT
Start: 2019-01-01 | End: 2019-01-01 | Stop reason: HOSPADM

## 2019-01-01 RX ORDER — ATORVASTATIN CALCIUM 40 MG/1
40 TABLET, FILM COATED ORAL DAILY
Status: DISCONTINUED | OUTPATIENT
Start: 2019-01-01 | End: 2019-01-01

## 2019-01-01 RX ORDER — HYDROMORPHONE HYDROCHLORIDE 1 MG/ML
0.5 INJECTION, SOLUTION INTRAMUSCULAR; INTRAVENOUS; SUBCUTANEOUS ONCE
Status: DISCONTINUED | OUTPATIENT
Start: 2019-01-01 | End: 2019-01-01

## 2019-01-01 RX ORDER — DIGOXIN 250 MCG
125 TABLET ORAL DAILY
COMMUNITY

## 2019-01-01 RX ORDER — SODIUM CHLORIDE 9 MG/ML
INJECTION, SOLUTION INTRAVENOUS CONTINUOUS
Status: DISCONTINUED | OUTPATIENT
Start: 2019-01-01 | End: 2019-01-01

## 2019-01-01 RX ORDER — SODIUM CHLORIDE, SODIUM LACTATE, POTASSIUM CHLORIDE, CALCIUM CHLORIDE 600; 310; 30; 20 MG/100ML; MG/100ML; MG/100ML; MG/100ML
500 INJECTION, SOLUTION INTRAVENOUS ONCE
Status: COMPLETED | OUTPATIENT
Start: 2019-01-01 | End: 2019-01-01

## 2019-01-01 RX ORDER — ALBUTEROL SULFATE 90 UG/1
4 AEROSOL, METERED RESPIRATORY (INHALATION) 4 TIMES DAILY
Status: DISCONTINUED | OUTPATIENT
Start: 2019-01-01 | End: 2019-01-01

## 2019-01-01 RX ORDER — PROPOFOL 10 MG/ML
INJECTION, EMULSION INTRAVENOUS
Status: DISCONTINUED
Start: 2019-01-01 | End: 2019-01-01

## 2019-01-01 RX ORDER — LIDOCAINE HYDROCHLORIDE 10 MG/ML
5 INJECTION, SOLUTION EPIDURAL; INFILTRATION; INTRACAUDAL; PERINEURAL ONCE
Status: COMPLETED | OUTPATIENT
Start: 2019-01-01 | End: 2019-01-01

## 2019-01-01 RX ORDER — PROPOFOL 10 MG/ML
10 INJECTION, EMULSION INTRAVENOUS
Status: DISCONTINUED | OUTPATIENT
Start: 2019-01-01 | End: 2019-01-01 | Stop reason: SDUPTHER

## 2019-01-01 RX ORDER — IPRATROPIUM BROMIDE AND ALBUTEROL SULFATE 2.5; .5 MG/3ML; MG/3ML
1 SOLUTION RESPIRATORY (INHALATION) EVERY 4 HOURS PRN
Status: DISCONTINUED | OUTPATIENT
Start: 2019-01-01 | End: 2019-01-01 | Stop reason: HOSPADM

## 2019-01-01 RX ORDER — WARFARIN SODIUM 2 MG/1
4 TABLET ORAL
Status: DISCONTINUED | OUTPATIENT
Start: 2019-01-01 | End: 2019-01-01 | Stop reason: ALTCHOICE

## 2019-01-01 RX ORDER — HYDROCODONE BITARTRATE AND ACETAMINOPHEN 5; 325 MG/1; MG/1
1 TABLET ORAL ONCE
Status: COMPLETED | OUTPATIENT
Start: 2019-01-01 | End: 2019-01-01

## 2019-01-01 RX ORDER — PROPOFOL 10 MG/ML
INJECTION, EMULSION INTRAVENOUS PRN
Status: DISCONTINUED | OUTPATIENT
Start: 2019-01-01 | End: 2019-01-01 | Stop reason: SDUPTHER

## 2019-01-01 RX ORDER — METOPROLOL TARTRATE 50 MG/1
100 TABLET, FILM COATED ORAL 2 TIMES DAILY
Status: DISCONTINUED | OUTPATIENT
Start: 2019-01-01 | End: 2019-01-01

## 2019-01-01 RX ORDER — AMITRIPTYLINE HYDROCHLORIDE 10 MG/1
10 TABLET, FILM COATED ORAL NIGHTLY
Status: ON HOLD | COMMUNITY
End: 2019-01-01

## 2019-01-01 RX ORDER — OXYCODONE HYDROCHLORIDE 5 MG/1
5 TABLET ORAL 3 TIMES DAILY
Status: DISCONTINUED | OUTPATIENT
Start: 2019-01-01 | End: 2019-01-01 | Stop reason: SDUPTHER

## 2019-01-01 RX ORDER — HEPARIN SODIUM 10000 [USP'U]/100ML
18 INJECTION, SOLUTION INTRAVENOUS CONTINUOUS
Status: DISCONTINUED | OUTPATIENT
Start: 2019-01-01 | End: 2019-01-01

## 2019-01-01 RX ORDER — LACTOBACILLUS RHAMNOSUS GG 10B CELL
1 CAPSULE ORAL 2 TIMES DAILY WITH MEALS
Status: DISCONTINUED | OUTPATIENT
Start: 2019-01-01 | End: 2019-01-01 | Stop reason: HOSPADM

## 2019-01-01 RX ORDER — ONDANSETRON 2 MG/ML
4 INJECTION INTRAMUSCULAR; INTRAVENOUS EVERY 6 HOURS PRN
Status: DISCONTINUED | OUTPATIENT
Start: 2019-01-01 | End: 2019-01-01 | Stop reason: HOSPADM

## 2019-01-01 RX ORDER — KETAMINE HCL IN NACL, ISO-OSM 100MG/10ML
SYRINGE (ML) INJECTION
Status: COMPLETED
Start: 2019-01-01 | End: 2019-01-01

## 2019-01-01 RX ORDER — CIPROFLOXACIN 2 MG/ML
400 INJECTION, SOLUTION INTRAVENOUS EVERY 12 HOURS
Status: DISCONTINUED | OUTPATIENT
Start: 2019-01-01 | End: 2019-01-01

## 2019-01-01 RX ORDER — PROPOFOL 10 MG/ML
10 INJECTION, EMULSION INTRAVENOUS CONTINUOUS
Status: DISCONTINUED | OUTPATIENT
Start: 2019-01-01 | End: 2019-01-01

## 2019-01-01 RX ORDER — LORAZEPAM 0.5 MG/1
0.5 TABLET ORAL EVERY 6 HOURS PRN
Status: ON HOLD | COMMUNITY
End: 2019-01-01

## 2019-01-01 RX ORDER — IPRATROPIUM BROMIDE AND ALBUTEROL SULFATE 2.5; .5 MG/3ML; MG/3ML
1 SOLUTION RESPIRATORY (INHALATION) 4 TIMES DAILY
Status: DISCONTINUED | OUTPATIENT
Start: 2019-01-01 | End: 2019-01-01

## 2019-01-01 RX ORDER — HEPARIN SODIUM 1000 [USP'U]/ML
80 INJECTION, SOLUTION INTRAVENOUS; SUBCUTANEOUS PRN
Status: DISCONTINUED | OUTPATIENT
Start: 2019-01-01 | End: 2019-01-01 | Stop reason: SDUPTHER

## 2019-01-01 RX ORDER — DILTIAZEM HYDROCHLORIDE 5 MG/ML
INJECTION INTRAVENOUS
Status: COMPLETED
Start: 2019-01-01 | End: 2019-01-01

## 2019-01-01 RX ORDER — CHLORHEXIDINE GLUCONATE 0.12 MG/ML
15 RINSE ORAL 2 TIMES DAILY
Status: DISCONTINUED | OUTPATIENT
Start: 2019-01-01 | End: 2019-01-01

## 2019-01-01 RX ORDER — SENNA AND DOCUSATE SODIUM 50; 8.6 MG/1; MG/1
1 TABLET, FILM COATED ORAL EVERY EVENING
COMMUNITY

## 2019-01-01 RX ORDER — VANCOMYCIN HYDROCHLORIDE 1 G/200ML
1000 INJECTION, SOLUTION INTRAVENOUS
Status: COMPLETED | OUTPATIENT
Start: 2019-01-01 | End: 2019-01-01

## 2019-01-01 RX ORDER — ETOMIDATE 2 MG/ML
INJECTION INTRAVENOUS
Status: COMPLETED
Start: 2019-01-01 | End: 2019-01-01

## 2019-01-01 RX ORDER — FENTANYL CITRATE 50 UG/ML
INJECTION, SOLUTION INTRAMUSCULAR; INTRAVENOUS PRN
Status: DISCONTINUED | OUTPATIENT
Start: 2019-01-01 | End: 2019-01-01 | Stop reason: SDUPTHER

## 2019-01-01 RX ORDER — PROPRANOLOL HYDROCHLORIDE 20 MG/1
20 TABLET ORAL 3 TIMES DAILY
Status: DISCONTINUED | OUTPATIENT
Start: 2019-01-01 | End: 2019-01-01

## 2019-01-01 RX ORDER — LORAZEPAM 2 MG/ML
0.5 INJECTION INTRAMUSCULAR EVERY 4 HOURS
Status: DISCONTINUED | OUTPATIENT
Start: 2019-01-01 | End: 2019-01-01 | Stop reason: HOSPADM

## 2019-01-01 RX ORDER — OXYCODONE HYDROCHLORIDE 5 MG/1
5 TABLET ORAL EVERY 8 HOURS
Qty: 15 TABLET | Refills: 0 | Status: SHIPPED | OUTPATIENT
Start: 2019-01-01 | End: 2019-01-01

## 2019-01-01 RX ORDER — FAMOTIDINE 20 MG/1
10 TABLET, FILM COATED ORAL 2 TIMES DAILY
Status: DISCONTINUED | OUTPATIENT
Start: 2019-01-01 | End: 2019-01-01 | Stop reason: HOSPADM

## 2019-01-01 RX ORDER — POTASSIUM CHLORIDE 7.45 MG/ML
10 INJECTION INTRAVENOUS PRN
Status: DISCONTINUED | OUTPATIENT
Start: 2019-01-01 | End: 2019-01-01

## 2019-01-01 RX ORDER — SODIUM CHLORIDE 0.9 % (FLUSH) 0.9 %
10 SYRINGE (ML) INJECTION PRN
Status: DISCONTINUED | OUTPATIENT
Start: 2019-01-01 | End: 2019-01-01 | Stop reason: SDUPTHER

## 2019-01-01 RX ORDER — OXYCODONE HYDROCHLORIDE 5 MG/1
5 TABLET ORAL EVERY 8 HOURS
Status: ON HOLD | COMMUNITY
End: 2019-01-01 | Stop reason: SDUPTHER

## 2019-01-01 RX ORDER — METHYLPREDNISOLONE SODIUM SUCCINATE 40 MG/ML
40 INJECTION, POWDER, LYOPHILIZED, FOR SOLUTION INTRAMUSCULAR; INTRAVENOUS EVERY 6 HOURS
Status: DISCONTINUED | OUTPATIENT
Start: 2019-01-01 | End: 2019-01-01

## 2019-01-01 RX ORDER — WARFARIN SODIUM 2 MG/1
2 TABLET ORAL
Status: DISCONTINUED | OUTPATIENT
Start: 2019-01-01 | End: 2019-01-01

## 2019-01-01 RX ORDER — SODIUM CHLORIDE 450 MG/100ML
INJECTION, SOLUTION INTRAVENOUS CONTINUOUS
Status: DISCONTINUED | OUTPATIENT
Start: 2019-01-01 | End: 2019-01-01 | Stop reason: HOSPADM

## 2019-01-01 RX ORDER — VANCOMYCIN HYDROCHLORIDE 1 G/200ML
1000 INJECTION, SOLUTION INTRAVENOUS EVERY 24 HOURS
Status: DISCONTINUED | OUTPATIENT
Start: 2019-01-01 | End: 2019-01-01

## 2019-01-01 RX ORDER — DESMOPRESSIN ACETATE 4 UG/ML
1 INJECTION, SOLUTION INTRAVENOUS; SUBCUTANEOUS ONCE
Status: COMPLETED | OUTPATIENT
Start: 2019-01-01 | End: 2019-01-01

## 2019-01-01 RX ORDER — PROPRANOLOL HYDROCHLORIDE 20 MG/1
10 TABLET ORAL 3 TIMES DAILY
Status: DISCONTINUED | OUTPATIENT
Start: 2019-01-01 | End: 2019-01-01

## 2019-01-01 RX ORDER — ACETAMINOPHEN 160 MG/5ML
650 SOLUTION ORAL EVERY 6 HOURS PRN
Status: DISCONTINUED | OUTPATIENT
Start: 2019-01-01 | End: 2019-01-01 | Stop reason: HOSPADM

## 2019-01-01 RX ORDER — PROPOFOL 10 MG/ML
10 INJECTION, EMULSION INTRAVENOUS
Status: DISCONTINUED | OUTPATIENT
Start: 2019-01-01 | End: 2019-01-01

## 2019-01-01 RX ORDER — 0.9 % SODIUM CHLORIDE 0.9 %
1000 INTRAVENOUS SOLUTION INTRAVENOUS ONCE
Status: COMPLETED | OUTPATIENT
Start: 2019-01-01 | End: 2019-01-01

## 2019-01-01 RX ORDER — LEVOFLOXACIN 5 MG/ML
750 INJECTION, SOLUTION INTRAVENOUS
Status: DISCONTINUED | OUTPATIENT
Start: 2019-01-01 | End: 2019-01-01

## 2019-01-01 RX ORDER — DEXTROSE MONOHYDRATE 50 MG/ML
INJECTION, SOLUTION INTRAVENOUS CONTINUOUS
Status: DISCONTINUED | OUTPATIENT
Start: 2019-01-01 | End: 2019-01-01 | Stop reason: CLARIF

## 2019-01-01 RX ORDER — FAMOTIDINE 20 MG/1
20 TABLET, FILM COATED ORAL DAILY
Status: DISCONTINUED | OUTPATIENT
Start: 2019-01-01 | End: 2019-01-01

## 2019-01-01 RX ORDER — HEPARIN SODIUM 1000 [USP'U]/ML
80 INJECTION, SOLUTION INTRAVENOUS; SUBCUTANEOUS ONCE
Status: DISCONTINUED | OUTPATIENT
Start: 2019-01-01 | End: 2019-01-01 | Stop reason: ALTCHOICE

## 2019-01-01 RX ORDER — SUCCINYLCHOLINE CHLORIDE 20 MG/ML
INJECTION INTRAMUSCULAR; INTRAVENOUS DAILY PRN
Status: COMPLETED | OUTPATIENT
Start: 2019-01-01 | End: 2019-01-01

## 2019-01-01 RX ORDER — QUETIAPINE FUMARATE 25 MG/1
12.5 TABLET, FILM COATED ORAL EVERY 6 HOURS PRN
Status: DISCONTINUED | OUTPATIENT
Start: 2019-01-01 | End: 2019-01-01 | Stop reason: HOSPADM

## 2019-01-01 RX ORDER — DILTIAZEM HYDROCHLORIDE 5 MG/ML
10 INJECTION INTRAVENOUS ONCE
Status: COMPLETED | OUTPATIENT
Start: 2019-01-01 | End: 2019-01-01

## 2019-01-01 RX ORDER — ONDANSETRON 2 MG/ML
4 INJECTION INTRAMUSCULAR; INTRAVENOUS EVERY 30 MIN PRN
Status: DISCONTINUED | OUTPATIENT
Start: 2019-01-01 | End: 2019-01-01

## 2019-01-01 RX ORDER — METOPROLOL TARTRATE 5 MG/5ML
5 INJECTION INTRAVENOUS EVERY 6 HOURS PRN
Status: DISCONTINUED | OUTPATIENT
Start: 2019-01-01 | End: 2019-01-01 | Stop reason: HOSPADM

## 2019-01-01 RX ORDER — IPRATROPIUM BROMIDE AND ALBUTEROL SULFATE 2.5; .5 MG/3ML; MG/3ML
1 SOLUTION RESPIRATORY (INHALATION) EVERY 4 HOURS PRN
Status: DISCONTINUED | OUTPATIENT
Start: 2019-01-01 | End: 2019-01-01

## 2019-01-01 RX ORDER — AMITRIPTYLINE HYDROCHLORIDE 10 MG/1
10 TABLET, FILM COATED ORAL NIGHTLY
Status: DISCONTINUED | OUTPATIENT
Start: 2019-01-01 | End: 2019-01-01

## 2019-01-01 RX ORDER — DEXTROSE MONOHYDRATE 25 G/50ML
12.5 INJECTION, SOLUTION INTRAVENOUS PRN
Status: DISCONTINUED | OUTPATIENT
Start: 2019-01-01 | End: 2019-01-01 | Stop reason: HOSPADM

## 2019-01-01 RX ORDER — MORPHINE SULFATE 4 MG/ML
5 INJECTION, SOLUTION INTRAMUSCULAR; INTRAVENOUS EVERY 4 HOURS PRN
Status: DISCONTINUED | OUTPATIENT
Start: 2019-01-01 | End: 2019-01-01 | Stop reason: HOSPADM

## 2019-01-01 RX ORDER — ATORVASTATIN CALCIUM 40 MG/1
40 TABLET, FILM COATED ORAL NIGHTLY
Status: DISCONTINUED | OUTPATIENT
Start: 2019-01-01 | End: 2019-01-01 | Stop reason: HOSPADM

## 2019-01-01 RX ORDER — WARFARIN SODIUM 4 MG/1
1 TABLET ORAL DAILY
Status: DISCONTINUED | OUTPATIENT
Start: 2019-01-01 | End: 2019-01-01 | Stop reason: SDUPTHER

## 2019-01-01 RX ORDER — ALBUTEROL SULFATE 90 UG/1
4 AEROSOL, METERED RESPIRATORY (INHALATION) EVERY 4 HOURS PRN
Status: DISCONTINUED | OUTPATIENT
Start: 2019-01-01 | End: 2019-01-01

## 2019-01-01 RX ORDER — PROPOFOL 10 MG/ML
INJECTION, EMULSION INTRAVENOUS
Status: COMPLETED
Start: 2019-01-01 | End: 2019-01-01

## 2019-01-01 RX ORDER — FAMOTIDINE 10 MG
20 TABLET ORAL 2 TIMES DAILY
COMMUNITY

## 2019-01-01 RX ORDER — SODIUM CHLORIDE 450 MG/100ML
INJECTION, SOLUTION INTRAVENOUS CONTINUOUS
Status: DISCONTINUED | OUTPATIENT
Start: 2019-01-01 | End: 2019-01-01

## 2019-01-01 RX ORDER — 0.9 % SODIUM CHLORIDE 0.9 %
30 INTRAVENOUS SOLUTION INTRAVENOUS ONCE
Status: COMPLETED | OUTPATIENT
Start: 2019-01-01 | End: 2019-01-01

## 2019-01-01 RX ORDER — HEPARIN SODIUM 1000 [USP'U]/ML
80 INJECTION, SOLUTION INTRAVENOUS; SUBCUTANEOUS ONCE
Status: COMPLETED | OUTPATIENT
Start: 2019-01-01 | End: 2019-01-01

## 2019-01-01 RX ORDER — METHYLPREDNISOLONE SODIUM SUCCINATE 40 MG/ML
40 INJECTION, POWDER, LYOPHILIZED, FOR SOLUTION INTRAMUSCULAR; INTRAVENOUS DAILY
Status: DISCONTINUED | OUTPATIENT
Start: 2019-01-01 | End: 2019-01-01

## 2019-01-01 RX ORDER — OXYCODONE HYDROCHLORIDE 5 MG/1
10 TABLET ORAL 3 TIMES DAILY
Status: DISCONTINUED | OUTPATIENT
Start: 2019-01-01 | End: 2019-01-01

## 2019-01-01 RX ORDER — PROPOFOL 10 MG/ML
INJECTION, EMULSION INTRAVENOUS CONTINUOUS PRN
Status: DISCONTINUED | OUTPATIENT
Start: 2019-01-01 | End: 2019-01-01 | Stop reason: SDUPTHER

## 2019-01-01 RX ORDER — OXYCODONE HYDROCHLORIDE AND ACETAMINOPHEN 5; 325 MG/1; MG/1
2 TABLET ORAL 3 TIMES DAILY
Status: DISCONTINUED | OUTPATIENT
Start: 2019-01-01 | End: 2019-01-01

## 2019-01-01 RX ORDER — METOPROLOL TARTRATE 50 MG/1
100 TABLET, FILM COATED ORAL 2 TIMES DAILY
Status: DISCONTINUED | OUTPATIENT
Start: 2019-01-01 | End: 2019-01-01 | Stop reason: HOSPADM

## 2019-01-01 RX ORDER — IPRATROPIUM BROMIDE AND ALBUTEROL SULFATE 2.5; .5 MG/3ML; MG/3ML
1 SOLUTION RESPIRATORY (INHALATION) 4 TIMES DAILY
COMMUNITY

## 2019-01-01 RX ORDER — LIDOCAINE HYDROCHLORIDE AND EPINEPHRINE 10; 10 MG/ML; UG/ML
INJECTION, SOLUTION INFILTRATION; PERINEURAL
Status: COMPLETED | OUTPATIENT
Start: 2019-01-01 | End: 2019-01-01

## 2019-01-01 RX ORDER — WARFARIN SODIUM 4 MG/1
TABLET ORAL
Qty: 90 TABLET | Refills: 0 | OUTPATIENT
Start: 2019-01-01

## 2019-01-01 RX ORDER — SODIUM CHLORIDE 9 MG/ML
INJECTION, SOLUTION INTRAVENOUS CONTINUOUS PRN
Status: DISCONTINUED | OUTPATIENT
Start: 2019-01-01 | End: 2019-01-01 | Stop reason: SDUPTHER

## 2019-01-01 RX ORDER — POLYETHYLENE GLYCOL 3350 17 G/17G
17 POWDER, FOR SOLUTION ORAL DAILY PRN
Status: DISCONTINUED | OUTPATIENT
Start: 2019-01-01 | End: 2019-01-01 | Stop reason: HOSPADM

## 2019-01-01 RX ORDER — LIDOCAINE HYDROCHLORIDE 10 MG/ML
5 INJECTION, SOLUTION EPIDURAL; INFILTRATION; INTRACAUDAL; PERINEURAL ONCE
Status: DISCONTINUED | OUTPATIENT
Start: 2019-01-01 | End: 2019-01-01 | Stop reason: HOSPADM

## 2019-01-01 RX ORDER — ATORVASTATIN CALCIUM 40 MG/1
40 TABLET, FILM COATED ORAL EVERY EVENING
COMMUNITY

## 2019-01-01 RX ORDER — PROPOFOL 10 MG/ML
10 INJECTION, EMULSION INTRAVENOUS
Status: DISCONTINUED | OUTPATIENT
Start: 2019-01-01 | End: 2019-01-01 | Stop reason: HOSPADM

## 2019-01-01 RX ORDER — QUETIAPINE FUMARATE 25 MG/1
12.5 TABLET, FILM COATED ORAL EVERY 6 HOURS PRN
Status: DISCONTINUED | OUTPATIENT
Start: 2019-01-01 | End: 2019-01-01 | Stop reason: SDUPTHER

## 2019-01-01 RX ORDER — DIGOXIN 125 MCG
0.12 TABLET ORAL DAILY
Status: DISCONTINUED | OUTPATIENT
Start: 2019-01-01 | End: 2019-01-01 | Stop reason: HOSPADM

## 2019-01-01 RX ORDER — MORPHINE SULFATE 4 MG/ML
4 INJECTION, SOLUTION INTRAMUSCULAR; INTRAVENOUS EVERY 30 MIN PRN
Status: COMPLETED | OUTPATIENT
Start: 2019-01-01 | End: 2019-01-01

## 2019-01-01 RX ORDER — DIGOXIN 0.25 MG/ML
125 INJECTION INTRAMUSCULAR; INTRAVENOUS DAILY
Status: DISCONTINUED | OUTPATIENT
Start: 2019-01-01 | End: 2019-01-01

## 2019-01-01 RX ORDER — BISACODYL 10 MG
10 SUPPOSITORY, RECTAL RECTAL DAILY PRN
COMMUNITY

## 2019-01-01 RX ORDER — HEPARIN SODIUM 1000 [USP'U]/ML
80 INJECTION, SOLUTION INTRAVENOUS; SUBCUTANEOUS PRN
Status: DISCONTINUED | OUTPATIENT
Start: 2019-01-01 | End: 2019-01-01

## 2019-01-01 RX ORDER — OXYCODONE HYDROCHLORIDE 5 MG/1
5 TABLET ORAL EVERY 8 HOURS PRN
Status: DISCONTINUED | OUTPATIENT
Start: 2019-01-01 | End: 2019-01-01

## 2019-01-01 RX ORDER — ACETAMINOPHEN 325 MG/1
650 TABLET ORAL EVERY 4 HOURS PRN
Status: DISCONTINUED | OUTPATIENT
Start: 2019-01-01 | End: 2019-01-01 | Stop reason: HOSPADM

## 2019-01-01 RX ORDER — LORAZEPAM 2 MG/ML
1 INJECTION INTRAMUSCULAR ONCE
Status: COMPLETED | OUTPATIENT
Start: 2019-01-01 | End: 2019-01-01

## 2019-01-01 RX ORDER — ONDANSETRON 4 MG/1
4 TABLET, ORALLY DISINTEGRATING ORAL EVERY 8 HOURS PRN
Status: ON HOLD | COMMUNITY
End: 2019-01-01 | Stop reason: SDUPTHER

## 2019-01-01 RX ORDER — SODIUM PHOSPHATE, DIBASIC AND SODIUM PHOSPHATE, MONOBASIC 7; 19 G/133ML; G/133ML
1 ENEMA RECTAL ONCE
Status: COMPLETED | OUTPATIENT
Start: 2019-01-01 | End: 2019-01-01

## 2019-01-01 RX ORDER — ALBUTEROL SULFATE 90 UG/1
2 AEROSOL, METERED RESPIRATORY (INHALATION) EVERY 4 HOURS PRN
Status: DISCONTINUED | OUTPATIENT
Start: 2019-01-01 | End: 2019-01-01 | Stop reason: HOSPADM

## 2019-01-01 RX ORDER — VENLAFAXINE HYDROCHLORIDE 37.5 MG/1
150 CAPSULE, EXTENDED RELEASE ORAL DAILY
Status: DISCONTINUED | OUTPATIENT
Start: 2019-01-01 | End: 2019-01-01

## 2019-01-01 RX ORDER — DILTIAZEM HYDROCHLORIDE 5 MG/ML
15 INJECTION INTRAVENOUS ONCE
Status: COMPLETED | OUTPATIENT
Start: 2019-01-01 | End: 2019-01-01

## 2019-01-01 RX ORDER — POTASSIUM CHLORIDE 20 MEQ/1
40 TABLET, EXTENDED RELEASE ORAL PRN
Status: DISCONTINUED | OUTPATIENT
Start: 2019-01-01 | End: 2019-01-01

## 2019-01-01 RX ORDER — WARFARIN SODIUM 2 MG/1
4 TABLET ORAL
Status: DISCONTINUED | OUTPATIENT
Start: 2019-01-01 | End: 2019-01-01

## 2019-01-01 RX ORDER — WARFARIN SODIUM 2 MG/1
4 TABLET ORAL
Status: DISCONTINUED | OUTPATIENT
Start: 2019-01-01 | End: 2019-01-01 | Stop reason: HOSPADM

## 2019-01-01 RX ORDER — SUCCINYLCHOLINE/SOD CL,ISO/PF 200MG/10ML
SYRINGE (ML) INTRAVENOUS
Status: COMPLETED
Start: 2019-01-01 | End: 2019-01-01

## 2019-01-01 RX ORDER — DEXTROSE MONOHYDRATE 50 MG/ML
INJECTION, SOLUTION INTRAVENOUS CONTINUOUS
Status: DISCONTINUED | OUTPATIENT
Start: 2019-01-01 | End: 2019-01-01

## 2019-01-01 RX ORDER — NICOTINE POLACRILEX 4 MG
15 LOZENGE BUCCAL PRN
Status: DISCONTINUED | OUTPATIENT
Start: 2019-01-01 | End: 2019-01-01 | Stop reason: HOSPADM

## 2019-01-01 RX ORDER — OXYCODONE HYDROCHLORIDE AND ACETAMINOPHEN 5; 325 MG/1; MG/1
1 TABLET ORAL EVERY 8 HOURS PRN
Status: DISCONTINUED | OUTPATIENT
Start: 2019-01-01 | End: 2019-01-01

## 2019-01-01 RX ORDER — ONDANSETRON 2 MG/ML
4 INJECTION INTRAMUSCULAR; INTRAVENOUS ONCE
Status: COMPLETED | OUTPATIENT
Start: 2019-01-01 | End: 2019-01-01

## 2019-01-01 RX ORDER — ACETAMINOPHEN 325 MG/1
650 TABLET ORAL EVERY 6 HOURS PRN
COMMUNITY

## 2019-01-01 RX ORDER — ROCURONIUM BROMIDE 10 MG/ML
INJECTION, SOLUTION INTRAVENOUS PRN
Status: DISCONTINUED | OUTPATIENT
Start: 2019-01-01 | End: 2019-01-01 | Stop reason: SDUPTHER

## 2019-01-01 RX ORDER — METOPROLOL TARTRATE 50 MG/1
50 TABLET, FILM COATED ORAL 2 TIMES DAILY
Status: DISCONTINUED | OUTPATIENT
Start: 2019-01-01 | End: 2019-01-01 | Stop reason: HOSPADM

## 2019-01-01 RX ORDER — LEVOFLOXACIN 5 MG/ML
750 INJECTION, SOLUTION INTRAVENOUS ONCE
Status: COMPLETED | OUTPATIENT
Start: 2019-01-01 | End: 2019-01-01

## 2019-01-01 RX ORDER — HYDROMORPHONE HYDROCHLORIDE 1 MG/ML
0.5 INJECTION, SOLUTION INTRAMUSCULAR; INTRAVENOUS; SUBCUTANEOUS ONCE
Status: COMPLETED | OUTPATIENT
Start: 2019-01-01 | End: 2019-01-01

## 2019-01-01 RX ORDER — SENNA AND DOCUSATE SODIUM 50; 8.6 MG/1; MG/1
1 TABLET, FILM COATED ORAL DAILY
Status: DISCONTINUED | OUTPATIENT
Start: 2019-01-01 | End: 2019-01-01 | Stop reason: HOSPADM

## 2019-01-01 RX ORDER — ESMOLOL HYDROCHLORIDE 10 MG/ML
INJECTION INTRAVENOUS PRN
Status: DISCONTINUED | OUTPATIENT
Start: 2019-01-01 | End: 2019-01-01 | Stop reason: SDUPTHER

## 2019-01-01 RX ADMIN — MEROPENEM 1000 MG: 1 INJECTION, POWDER, FOR SOLUTION INTRAVENOUS at 04:21

## 2019-01-01 RX ADMIN — PROPOFOL 50 MCG/KG/MIN: 10 INJECTION, EMULSION INTRAVENOUS at 01:56

## 2019-01-01 RX ADMIN — DESMOPRESSIN ACETATE 40 MG: 0.2 TABLET ORAL at 23:00

## 2019-01-01 RX ADMIN — METOPROLOL TARTRATE 50 MG: 50 TABLET, FILM COATED ORAL at 08:06

## 2019-01-01 RX ADMIN — Medication 10 ML: at 20:19

## 2019-01-01 RX ADMIN — FAMOTIDINE 20 MG: 10 INJECTION, SOLUTION INTRAVENOUS at 20:07

## 2019-01-01 RX ADMIN — SODIUM CHLORIDE: 9 INJECTION, SOLUTION INTRAVENOUS at 03:16

## 2019-01-01 RX ADMIN — METHYLPREDNISOLONE SODIUM SUCCINATE 40 MG: 40 INJECTION, POWDER, FOR SOLUTION INTRAMUSCULAR; INTRAVENOUS at 01:48

## 2019-01-01 RX ADMIN — CIPROFLOXACIN 1 DROP: 3 SOLUTION OPHTHALMIC at 18:13

## 2019-01-01 RX ADMIN — PROPOFOL 20 MCG/KG/MIN: 10 INJECTION, EMULSION INTRAVENOUS at 22:04

## 2019-01-01 RX ADMIN — PROPOFOL 30 MCG/KG/MIN: 10 INJECTION, EMULSION INTRAVENOUS at 22:01

## 2019-01-01 RX ADMIN — FAMOTIDINE 20 MG: 10 INJECTION, SOLUTION INTRAVENOUS at 20:59

## 2019-01-01 RX ADMIN — PROPRANOLOL HYDROCHLORIDE 10 MG: 20 TABLET ORAL at 08:21

## 2019-01-01 RX ADMIN — Medication 6 PUFF: at 08:08

## 2019-01-01 RX ADMIN — CIPROFLOXACIN 1 DROP: 3 SOLUTION OPHTHALMIC at 05:56

## 2019-01-01 RX ADMIN — MEROPENEM 1 G: 1 INJECTION, POWDER, FOR SOLUTION INTRAVENOUS at 11:58

## 2019-01-01 RX ADMIN — ACETAMINOPHEN 650 MG: 325 TABLET, FILM COATED ORAL at 04:19

## 2019-01-01 RX ADMIN — Medication 10 ML: at 20:11

## 2019-01-01 RX ADMIN — ETOMIDATE: 20 INJECTION, SOLUTION INTRAVENOUS at 16:04

## 2019-01-01 RX ADMIN — METOPROLOL TARTRATE 100 MG: 50 TABLET, FILM COATED ORAL at 09:47

## 2019-01-01 RX ADMIN — PROPOFOL 45 MCG/KG/MIN: 10 INJECTION, EMULSION INTRAVENOUS at 01:13

## 2019-01-01 RX ADMIN — Medication 10 ML: at 09:01

## 2019-01-01 RX ADMIN — APIXABAN 5 MG: 5 TABLET, FILM COATED ORAL at 09:43

## 2019-01-01 RX ADMIN — DEXMEDETOMIDINE 0.7 MCG/KG/HR: 100 INJECTION, SOLUTION, CONCENTRATE INTRAVENOUS at 05:55

## 2019-01-01 RX ADMIN — CHLORHEXIDINE GLUCONATE 15 ML: 1.2 RINSE ORAL at 23:09

## 2019-01-01 RX ADMIN — QUETIAPINE FUMARATE 12.5 MG: 25 TABLET ORAL at 22:59

## 2019-01-01 RX ADMIN — OXYCODONE HYDROCHLORIDE 5 MG: 5 TABLET ORAL at 21:30

## 2019-01-01 RX ADMIN — PROPOFOL 45 MCG/KG/MIN: 10 INJECTION, EMULSION INTRAVENOUS at 20:27

## 2019-01-01 RX ADMIN — PROPOFOL 40 MCG/KG/MIN: 10 INJECTION, EMULSION INTRAVENOUS at 08:17

## 2019-01-01 RX ADMIN — Medication 10 ML: at 20:30

## 2019-01-01 RX ADMIN — HEPARIN SODIUM 16 UNITS/KG/HR: 10000 INJECTION, SOLUTION INTRAVENOUS at 08:41

## 2019-01-01 RX ADMIN — Medication 10 ML: at 10:28

## 2019-01-01 RX ADMIN — TIOTROPIUM BROMIDE INHALATION SPRAY 2 PUFF: 3.12 SPRAY, METERED RESPIRATORY (INHALATION) at 08:06

## 2019-01-01 RX ADMIN — OXYCODONE HYDROCHLORIDE 5 MG: 5 TABLET ORAL at 09:43

## 2019-01-01 RX ADMIN — TOPIRAMATE 50 MG: 25 TABLET, FILM COATED ORAL at 21:26

## 2019-01-01 RX ADMIN — TOPIRAMATE 50 MG: 25 TABLET, FILM COATED ORAL at 20:43

## 2019-01-01 RX ADMIN — PROPOFOL 30 MCG/KG/MIN: 10 INJECTION, EMULSION INTRAVENOUS at 13:44

## 2019-01-01 RX ADMIN — WARFARIN SODIUM 2 MG: 2 TABLET ORAL at 17:57

## 2019-01-01 RX ADMIN — CIPROFLOXACIN 400 MG: 2 INJECTION, SOLUTION INTRAVENOUS at 05:13

## 2019-01-01 RX ADMIN — HEPARIN SODIUM 12 UNITS/KG/HR: 10000 INJECTION, SOLUTION INTRAVENOUS at 23:16

## 2019-01-01 RX ADMIN — DILTIAZEM HYDROCHLORIDE 5 MG/HR: 5 INJECTION INTRAVENOUS at 21:41

## 2019-01-01 RX ADMIN — TOPIRAMATE 50 MG: 25 TABLET, FILM COATED ORAL at 21:50

## 2019-01-01 RX ADMIN — FENTANYL CITRATE 1.13 MCG/KG/HR: 50 INJECTION INTRAVENOUS at 08:21

## 2019-01-01 RX ADMIN — CIPROFLOXACIN 400 MG: 2 INJECTION, SOLUTION INTRAVENOUS at 04:19

## 2019-01-01 RX ADMIN — LEUCINE, PHENYLALANINE, LYSINE, METHIONINE, ISOLEUCINE, VALINE, HISTIDINE, THREONINE, TRYPTOPHAN, ALANINE, GLYCINE, ARGININE, PROLINE, SERINE, TYROSINE, SODIUM ACETATE, DIBASIC POTASSIUM PHOSPHATE, MAGNESIUM CHLORIDE, SODIUM CHLORIDE, CALCIUM CHLORIDE, DEXTROSE
365; 280; 290; 200; 300; 290; 240; 210; 90; 1035; 515; 575; 340; 250; 20; 340; 261; 51; 59; 33; 20 INJECTION INTRAVENOUS at 18:09

## 2019-01-01 RX ADMIN — PROPOFOL 20 MCG/KG/MIN: 10 INJECTION, EMULSION INTRAVENOUS at 12:57

## 2019-01-01 RX ADMIN — PROPOFOL 45 MCG/KG/MIN: 10 INJECTION, EMULSION INTRAVENOUS at 22:32

## 2019-01-01 RX ADMIN — Medication 10 ML: at 08:27

## 2019-01-01 RX ADMIN — Medication 10 ML: at 08:41

## 2019-01-01 RX ADMIN — TIOTROPIUM BROMIDE INHALATION SPRAY 2 PUFF: 3.12 SPRAY, METERED RESPIRATORY (INHALATION) at 07:42

## 2019-01-01 RX ADMIN — APIXABAN 5 MG: 5 TABLET, FILM COATED ORAL at 20:39

## 2019-01-01 RX ADMIN — Medication 4 PUFF: at 08:20

## 2019-01-01 RX ADMIN — Medication 10 ML: at 20:29

## 2019-01-01 RX ADMIN — FAMOTIDINE 10 MG: 20 TABLET ORAL at 09:02

## 2019-01-01 RX ADMIN — FENTANYL CITRATE 1.5 MCG/KG/HR: 50 INJECTION INTRAVENOUS at 09:57

## 2019-01-01 RX ADMIN — MEROPENEM 1 G: 1 INJECTION, POWDER, FOR SOLUTION INTRAVENOUS at 22:00

## 2019-01-01 RX ADMIN — METOPROLOL TARTRATE 5 MG: 5 INJECTION INTRAVENOUS at 06:06

## 2019-01-01 RX ADMIN — HEPARIN SODIUM 2630 UNITS: 1000 INJECTION INTRAVENOUS; SUBCUTANEOUS at 02:07

## 2019-01-01 RX ADMIN — FAMOTIDINE 20 MG: 10 INJECTION, SOLUTION INTRAVENOUS at 08:25

## 2019-01-01 RX ADMIN — HEPARIN SODIUM 18 UNITS/KG/HR: 10000 INJECTION, SOLUTION INTRAVENOUS at 08:22

## 2019-01-01 RX ADMIN — PROPRANOLOL HYDROCHLORIDE 20 MG: 20 TABLET ORAL at 08:20

## 2019-01-01 RX ADMIN — CIPROFLOXACIN 1 DROP: 3 SOLUTION OPHTHALMIC at 06:44

## 2019-01-01 RX ADMIN — PROPOFOL 55 MCG/KG/MIN: 10 INJECTION, EMULSION INTRAVENOUS at 02:02

## 2019-01-01 RX ADMIN — ONDANSETRON 4 MG: 2 INJECTION INTRAMUSCULAR; INTRAVENOUS at 17:02

## 2019-01-01 RX ADMIN — FENTANYL CITRATE 25 MCG: 50 INJECTION INTRAMUSCULAR; INTRAVENOUS at 22:26

## 2019-01-01 RX ADMIN — PROPRANOLOL HYDROCHLORIDE 20 MG: 20 TABLET ORAL at 20:51

## 2019-01-01 RX ADMIN — CIPROFLOXACIN 1 DROP: 3 SOLUTION OPHTHALMIC at 19:33

## 2019-01-01 RX ADMIN — SENNOSIDES AND DOCUSATE SODIUM 1 TABLET: 8.6; 5 TABLET ORAL at 09:03

## 2019-01-01 RX ADMIN — METHYLPREDNISOLONE SODIUM SUCCINATE 40 MG: 40 INJECTION, POWDER, FOR SOLUTION INTRAMUSCULAR; INTRAVENOUS at 06:06

## 2019-01-01 RX ADMIN — MAGNESIUM SULFATE HEPTAHYDRATE 2 G: 40 INJECTION, SOLUTION INTRAVENOUS at 06:50

## 2019-01-01 RX ADMIN — MEROPENEM 1 G: 1 INJECTION, POWDER, FOR SOLUTION INTRAVENOUS at 13:00

## 2019-01-01 RX ADMIN — CIPROFLOXACIN 1 DROP: 3 SOLUTION OPHTHALMIC at 13:21

## 2019-01-01 RX ADMIN — SODIUM CHLORIDE: 9 INJECTION, SOLUTION INTRAVENOUS at 22:57

## 2019-01-01 RX ADMIN — METHYLPREDNISOLONE SODIUM SUCCINATE 40 MG: 40 INJECTION, POWDER, FOR SOLUTION INTRAMUSCULAR; INTRAVENOUS at 12:16

## 2019-01-01 RX ADMIN — Medication 10 ML: at 20:39

## 2019-01-01 RX ADMIN — METRONIDAZOLE 500 MG: 500 INJECTION, SOLUTION INTRAVENOUS at 10:57

## 2019-01-01 RX ADMIN — RISPERIDONE 0.25 MG: 0.5 TABLET ORAL at 19:52

## 2019-01-01 RX ADMIN — Medication 1 G: at 16:29

## 2019-01-01 RX ADMIN — ALTEPLASE 2 MG: 2.2 INJECTION, POWDER, LYOPHILIZED, FOR SOLUTION INTRAVENOUS at 11:00

## 2019-01-01 RX ADMIN — CALCIUM GLUCONATE 1 G: 98 INJECTION, SOLUTION INTRAVENOUS at 06:11

## 2019-01-01 RX ADMIN — FAMOTIDINE 20 MG: 10 INJECTION, SOLUTION INTRAVENOUS at 10:26

## 2019-01-01 RX ADMIN — TOPIRAMATE 50 MG: 25 TABLET, FILM COATED ORAL at 08:45

## 2019-01-01 RX ADMIN — LORAZEPAM 0.5 MG: 2 INJECTION INTRAMUSCULAR; INTRAVENOUS at 09:43

## 2019-01-01 RX ADMIN — Medication 1 CAPSULE: at 08:53

## 2019-01-01 RX ADMIN — VENLAFAXINE HYDROCHLORIDE 150 MG: 150 CAPSULE, EXTENDED RELEASE ORAL at 12:25

## 2019-01-01 RX ADMIN — MEROPENEM 1 G: 1 INJECTION, POWDER, FOR SOLUTION INTRAVENOUS at 20:43

## 2019-01-01 RX ADMIN — Medication 10 ML: at 08:52

## 2019-01-01 RX ADMIN — LORAZEPAM 0.5 MG: 2 INJECTION INTRAMUSCULAR; INTRAVENOUS at 21:48

## 2019-01-01 RX ADMIN — METRONIDAZOLE 500 MG: 500 INJECTION, SOLUTION INTRAVENOUS at 16:55

## 2019-01-01 RX ADMIN — IOPAMIDOL 75 ML: 755 INJECTION, SOLUTION INTRAVENOUS at 16:52

## 2019-01-01 RX ADMIN — SODIUM CHLORIDE 1000 ML: 9 INJECTION, SOLUTION INTRAVENOUS at 17:02

## 2019-01-01 RX ADMIN — FAMOTIDINE 20 MG: 10 INJECTION, SOLUTION INTRAVENOUS at 21:26

## 2019-01-01 RX ADMIN — Medication 10 ML: at 23:00

## 2019-01-01 RX ADMIN — Medication 10 ML: at 20:40

## 2019-01-01 RX ADMIN — PROPOFOL 50 MCG/KG/MIN: 10 INJECTION, EMULSION INTRAVENOUS at 04:35

## 2019-01-01 RX ADMIN — VANCOMYCIN HYDROCHLORIDE 750 MG: 750 INJECTION, POWDER, LYOPHILIZED, FOR SOLUTION INTRAVENOUS at 22:58

## 2019-01-01 RX ADMIN — Medication 10 ML: at 21:13

## 2019-01-01 RX ADMIN — LORAZEPAM 0.5 MG: 2 INJECTION INTRAMUSCULAR; INTRAVENOUS at 10:53

## 2019-01-01 RX ADMIN — ENOXAPARIN SODIUM 70 MG: 80 INJECTION SUBCUTANEOUS at 08:40

## 2019-01-01 RX ADMIN — DILTIAZEM HYDROCHLORIDE 15 MG/HR: 5 INJECTION INTRAVENOUS at 06:31

## 2019-01-01 RX ADMIN — ACETAMINOPHEN 650 MG: 325 TABLET, FILM COATED ORAL at 05:53

## 2019-01-01 RX ADMIN — METHYLPREDNISOLONE SODIUM SUCCINATE 40 MG: 40 INJECTION, POWDER, FOR SOLUTION INTRAMUSCULAR; INTRAVENOUS at 08:25

## 2019-01-01 RX ADMIN — FAMOTIDINE 10 MG: 20 TABLET ORAL at 09:03

## 2019-01-01 RX ADMIN — TOPIRAMATE 50 MG: 25 TABLET, FILM COATED ORAL at 21:09

## 2019-01-01 RX ADMIN — FAMOTIDINE 20 MG: 10 INJECTION, SOLUTION INTRAVENOUS at 09:50

## 2019-01-01 RX ADMIN — OXYCODONE HYDROCHLORIDE 5 MG: 5 TABLET ORAL at 14:02

## 2019-01-01 RX ADMIN — Medication 10 ML: at 21:04

## 2019-01-01 RX ADMIN — HEPARIN SODIUM 8 UNITS/KG/HR: 10000 INJECTION, SOLUTION INTRAVENOUS at 13:53

## 2019-01-01 RX ADMIN — HEPARIN SODIUM 2630 UNITS: 1000 INJECTION INTRAVENOUS; SUBCUTANEOUS at 13:53

## 2019-01-01 RX ADMIN — PROPRANOLOL HYDROCHLORIDE 10 MG: 20 TABLET ORAL at 21:26

## 2019-01-01 RX ADMIN — METOPROLOL TARTRATE 50 MG: 50 TABLET, FILM COATED ORAL at 08:45

## 2019-01-01 RX ADMIN — ENOXAPARIN SODIUM 70 MG: 80 INJECTION SUBCUTANEOUS at 20:43

## 2019-01-01 RX ADMIN — CIPROFLOXACIN 1 DROP: 3 SOLUTION OPHTHALMIC at 23:27

## 2019-01-01 RX ADMIN — CIPROFLOXACIN 1 DROP: 3 SOLUTION OPHTHALMIC at 11:55

## 2019-01-01 RX ADMIN — PROPRANOLOL HYDROCHLORIDE 10 MG: 20 TABLET ORAL at 21:00

## 2019-01-01 RX ADMIN — VANCOMYCIN HYDROCHLORIDE 750 MG: 750 INJECTION, POWDER, LYOPHILIZED, FOR SOLUTION INTRAVENOUS at 08:17

## 2019-01-01 RX ADMIN — Medication 10 ML: at 20:18

## 2019-01-01 RX ADMIN — OXYCODONE HYDROCHLORIDE 5 MG: 5 TABLET ORAL at 00:12

## 2019-01-01 RX ADMIN — PROPRANOLOL HYDROCHLORIDE 10 MG: 20 TABLET ORAL at 13:58

## 2019-01-01 RX ADMIN — LORAZEPAM 0.5 MG: 0.5 TABLET ORAL at 20:38

## 2019-01-01 RX ADMIN — TOPIRAMATE 50 MG: 25 TABLET, FILM COATED ORAL at 19:51

## 2019-01-01 RX ADMIN — METOPROLOL TARTRATE 50 MG: 50 TABLET, FILM COATED ORAL at 20:18

## 2019-01-01 RX ADMIN — FAMOTIDINE 20 MG: 10 INJECTION, SOLUTION INTRAVENOUS at 20:30

## 2019-01-01 RX ADMIN — Medication 1 G: at 14:49

## 2019-01-01 RX ADMIN — Medication 10 ML: at 20:07

## 2019-01-01 RX ADMIN — FAMOTIDINE 20 MG: 10 INJECTION, SOLUTION INTRAVENOUS at 08:53

## 2019-01-01 RX ADMIN — POTASSIUM CHLORIDE 20 MEQ: 29.8 INJECTION, SOLUTION INTRAVENOUS at 06:44

## 2019-01-01 RX ADMIN — CIPROFLOXACIN 1 DROP: 3 SOLUTION OPHTHALMIC at 06:00

## 2019-01-01 RX ADMIN — INSULIN LISPRO 1 UNITS: 100 INJECTION, SOLUTION INTRAVENOUS; SUBCUTANEOUS at 18:34

## 2019-01-01 RX ADMIN — HEPARIN SODIUM 18 UNITS/KG/HR: 10000 INJECTION, SOLUTION INTRAVENOUS at 08:15

## 2019-01-01 RX ADMIN — METHYLPREDNISOLONE SODIUM SUCCINATE 40 MG: 40 INJECTION, POWDER, FOR SOLUTION INTRAMUSCULAR; INTRAVENOUS at 06:00

## 2019-01-01 RX ADMIN — OXYCODONE HYDROCHLORIDE 5 MG: 5 TABLET ORAL at 13:25

## 2019-01-01 RX ADMIN — METRONIDAZOLE 500 MG: 500 INJECTION, SOLUTION INTRAVENOUS at 13:06

## 2019-01-01 RX ADMIN — MEROPENEM 1 G: 1 INJECTION, POWDER, FOR SOLUTION INTRAVENOUS at 13:03

## 2019-01-01 RX ADMIN — VANCOMYCIN HYDROCHLORIDE 1000 MG: 1 INJECTION, SOLUTION INTRAVENOUS at 06:32

## 2019-01-01 RX ADMIN — DEXTROSE MONOHYDRATE: 50 INJECTION, SOLUTION INTRAVENOUS at 08:32

## 2019-01-01 RX ADMIN — Medication 10 ML: at 09:49

## 2019-01-01 RX ADMIN — APIXABAN 5 MG: 5 TABLET, FILM COATED ORAL at 09:02

## 2019-01-01 RX ADMIN — METHYLPREDNISOLONE SODIUM SUCCINATE 40 MG: 40 INJECTION, POWDER, FOR SOLUTION INTRAMUSCULAR; INTRAVENOUS at 00:20

## 2019-01-01 RX ADMIN — Medication 1 G: at 22:30

## 2019-01-01 RX ADMIN — METHYLPREDNISOLONE SODIUM SUCCINATE 40 MG: 40 INJECTION, POWDER, FOR SOLUTION INTRAMUSCULAR; INTRAVENOUS at 06:42

## 2019-01-01 RX ADMIN — MEROPENEM 1 G: 1 INJECTION, POWDER, FOR SOLUTION INTRAVENOUS at 20:16

## 2019-01-01 RX ADMIN — QUETIAPINE FUMARATE 12.5 MG: 25 TABLET ORAL at 20:45

## 2019-01-01 RX ADMIN — OXYCODONE HYDROCHLORIDE 5 MG: 5 TABLET ORAL at 13:59

## 2019-01-01 RX ADMIN — DEXTROSE MONOHYDRATE 12.5 G: 500 INJECTION PARENTERAL at 18:08

## 2019-01-01 RX ADMIN — Medication 10 ML: at 09:00

## 2019-01-01 RX ADMIN — METOPROLOL TARTRATE 5 MG: 5 INJECTION INTRAVENOUS at 05:02

## 2019-01-01 RX ADMIN — FENTANYL CITRATE 1 MCG/KG/HR: 50 INJECTION INTRAVENOUS at 11:41

## 2019-01-01 RX ADMIN — METOPROLOL TARTRATE 50 MG: 50 TABLET, FILM COATED ORAL at 08:47

## 2019-01-01 RX ADMIN — HEPARIN SODIUM 16 UNITS/KG/HR: 10000 INJECTION, SOLUTION INTRAVENOUS at 06:40

## 2019-01-01 RX ADMIN — FENTANYL CITRATE 1.5 MCG/KG/HR: 50 INJECTION INTRAVENOUS at 03:59

## 2019-01-01 RX ADMIN — FAMOTIDINE 10 MG: 20 TABLET ORAL at 22:59

## 2019-01-01 RX ADMIN — CIPROFLOXACIN 1 DROP: 3 SOLUTION OPHTHALMIC at 12:33

## 2019-01-01 RX ADMIN — SODIUM CHLORIDE, POTASSIUM CHLORIDE, SODIUM LACTATE AND CALCIUM CHLORIDE 500 ML: 600; 310; 30; 20 INJECTION, SOLUTION INTRAVENOUS at 20:46

## 2019-01-01 RX ADMIN — ENOXAPARIN SODIUM 70 MG: 80 INJECTION SUBCUTANEOUS at 21:49

## 2019-01-01 RX ADMIN — SODIUM CHLORIDE: 9 INJECTION, SOLUTION INTRAVENOUS at 18:18

## 2019-01-01 RX ADMIN — TOPIRAMATE 50 MG: 25 TABLET, FILM COATED ORAL at 00:19

## 2019-01-01 RX ADMIN — METHYLPREDNISOLONE SODIUM SUCCINATE 40 MG: 40 INJECTION, POWDER, FOR SOLUTION INTRAMUSCULAR; INTRAVENOUS at 13:25

## 2019-01-01 RX ADMIN — SODIUM PHOSPHATE 1 ENEMA: 7; 19 ENEMA RECTAL at 15:42

## 2019-01-01 RX ADMIN — DEXMEDETOMIDINE 0.5 MCG/KG/HR: 100 INJECTION, SOLUTION, CONCENTRATE INTRAVENOUS at 19:03

## 2019-01-01 RX ADMIN — CIPROFLOXACIN 1 DROP: 3 SOLUTION OPHTHALMIC at 12:07

## 2019-01-01 RX ADMIN — TOPIRAMATE 50 MG: 25 TABLET, FILM COATED ORAL at 09:08

## 2019-01-01 RX ADMIN — ACETAMINOPHEN 650 MG: 325 TABLET, FILM COATED ORAL at 11:12

## 2019-01-01 RX ADMIN — DEXTROSE MONOHYDRATE: 50 INJECTION, SOLUTION INTRAVENOUS at 23:17

## 2019-01-01 RX ADMIN — FENTANYL CITRATE 1 MCG/KG/HR: 50 INJECTION INTRAVENOUS at 10:00

## 2019-01-01 RX ADMIN — ALTEPLASE 2 MG: 2.2 INJECTION, POWDER, LYOPHILIZED, FOR SOLUTION INTRAVENOUS at 13:59

## 2019-01-01 RX ADMIN — DIGOXIN 0.12 MG: 125 TABLET ORAL at 09:42

## 2019-01-01 RX ADMIN — FAMOTIDINE 20 MG: 20 TABLET ORAL at 09:08

## 2019-01-01 RX ADMIN — LORAZEPAM 0.5 MG: 2 INJECTION INTRAMUSCULAR; INTRAVENOUS at 21:50

## 2019-01-01 RX ADMIN — CIPROFLOXACIN 1 DROP: 3 SOLUTION OPHTHALMIC at 17:47

## 2019-01-01 RX ADMIN — INSULIN LISPRO 1 UNITS: 100 INJECTION, SOLUTION INTRAVENOUS; SUBCUTANEOUS at 06:30

## 2019-01-01 RX ADMIN — METRONIDAZOLE 500 MG: 500 INJECTION, SOLUTION INTRAVENOUS at 18:11

## 2019-01-01 RX ADMIN — ASCORBIC ACID, VITAMIN A PALMITATE, CHOLECALCIFEROL, THIAMINE HYDROCHLORIDE, RIBOFLAVIN-5 PHOSPHATE SODIUM, PYRIDOXINE HYDROCHLORIDE, NIACINAMIDE, DEXPANTHENOL, ALPHA-TOCOPHEROL ACETATE, VITAMIN K1, FOLIC ACID, BIOTIN, CYANOCOBALAMIN: 200; 3300; 200; 6; 3.6; 6; 40; 15; 10; 150; 600; 60; 5 INJECTION, SOLUTION INTRAVENOUS at 17:50

## 2019-01-01 RX ADMIN — Medication 10 ML: at 20:41

## 2019-01-01 RX ADMIN — TOPIRAMATE 50 MG: 25 TABLET, FILM COATED ORAL at 10:33

## 2019-01-01 RX ADMIN — DIGOXIN 0.12 MG: 125 TABLET ORAL at 09:47

## 2019-01-01 RX ADMIN — PROPOFOL 50 MCG/KG/MIN: 10 INJECTION, EMULSION INTRAVENOUS at 01:04

## 2019-01-01 RX ADMIN — IOPAMIDOL 75 ML: 755 INJECTION, SOLUTION INTRAVENOUS at 18:50

## 2019-01-01 RX ADMIN — LORAZEPAM 0.5 MG: 2 INJECTION INTRAMUSCULAR; INTRAVENOUS at 22:11

## 2019-01-01 RX ADMIN — HALOPERIDOL LACTATE 3 MG: 5 INJECTION INTRAMUSCULAR at 04:26

## 2019-01-01 RX ADMIN — METOPROLOL TARTRATE 100 MG: 50 TABLET, FILM COATED ORAL at 09:39

## 2019-01-01 RX ADMIN — LORAZEPAM 0.5 MG: 0.5 TABLET ORAL at 14:49

## 2019-01-01 RX ADMIN — PROPOFOL 50 MCG/KG/MIN: 10 INJECTION, EMULSION INTRAVENOUS at 04:00

## 2019-01-01 RX ADMIN — FAMOTIDINE 20 MG: 10 INJECTION, SOLUTION INTRAVENOUS at 08:04

## 2019-01-01 RX ADMIN — SODIUM CHLORIDE 905 ML: 9 INJECTION, SOLUTION INTRAVENOUS at 18:17

## 2019-01-01 RX ADMIN — PROPOFOL 25 MCG/KG/MIN: 10 INJECTION, EMULSION INTRAVENOUS at 21:33

## 2019-01-01 RX ADMIN — Medication 10 ML: at 21:28

## 2019-01-01 RX ADMIN — Medication 6 PUFF: at 03:51

## 2019-01-01 RX ADMIN — Medication 1 G: at 15:21

## 2019-01-01 RX ADMIN — TOPIRAMATE 50 MG: 25 TABLET, FILM COATED ORAL at 21:03

## 2019-01-01 RX ADMIN — LORAZEPAM 0.5 MG: 2 INJECTION INTRAMUSCULAR; INTRAVENOUS at 11:12

## 2019-01-01 RX ADMIN — Medication 10 ML: at 23:03

## 2019-01-01 RX ADMIN — AMITRIPTYLINE HYDROCHLORIDE 10 MG: 10 TABLET, FILM COATED ORAL at 21:14

## 2019-01-01 RX ADMIN — MORPHINE SULFATE 5 MG: 4 INJECTION INTRAVENOUS at 02:13

## 2019-01-01 RX ADMIN — TOPIRAMATE 50 MG: 25 TABLET, FILM COATED ORAL at 08:40

## 2019-01-01 RX ADMIN — LORAZEPAM 0.5 MG: 0.5 TABLET ORAL at 08:19

## 2019-01-01 RX ADMIN — FAMOTIDINE 20 MG: 10 INJECTION, SOLUTION INTRAVENOUS at 22:01

## 2019-01-01 RX ADMIN — FAMOTIDINE 20 MG: 10 INJECTION, SOLUTION INTRAVENOUS at 21:02

## 2019-01-01 RX ADMIN — ETOMIDATE 20 MG: 20 INJECTION, SOLUTION INTRAVENOUS at 18:56

## 2019-01-01 RX ADMIN — TOPIRAMATE 50 MG: 25 TABLET, FILM COATED ORAL at 09:03

## 2019-01-01 RX ADMIN — FAMOTIDINE 20 MG: 10 INJECTION, SOLUTION INTRAVENOUS at 08:46

## 2019-01-01 RX ADMIN — METOPROLOL TARTRATE 50 MG: 50 TABLET, FILM COATED ORAL at 20:08

## 2019-01-01 RX ADMIN — Medication 1 CAPSULE: at 17:35

## 2019-01-01 RX ADMIN — METRONIDAZOLE 500 MG: 500 INJECTION, SOLUTION INTRAVENOUS at 06:13

## 2019-01-01 RX ADMIN — CIPROFLOXACIN 1 DROP: 3 SOLUTION OPHTHALMIC at 23:44

## 2019-01-01 RX ADMIN — CIPROFLOXACIN 1 DROP: 3 SOLUTION OPHTHALMIC at 01:10

## 2019-01-01 RX ADMIN — METOPROLOL TARTRATE 100 MG: 50 TABLET, FILM COATED ORAL at 20:39

## 2019-01-01 RX ADMIN — OXYCODONE HYDROCHLORIDE 10 MG: 5 TABLET ORAL at 20:14

## 2019-01-01 RX ADMIN — PROPOFOL 10 MCG/KG/MIN: 10 INJECTION, EMULSION INTRAVENOUS at 16:16

## 2019-01-01 RX ADMIN — PROPOFOL 45 MCG/KG/MIN: 10 INJECTION, EMULSION INTRAVENOUS at 06:19

## 2019-01-01 RX ADMIN — TOPIRAMATE 50 MG: 25 TABLET, FILM COATED ORAL at 21:01

## 2019-01-01 RX ADMIN — CIPROFLOXACIN 1 DROP: 3 SOLUTION OPHTHALMIC at 13:44

## 2019-01-01 RX ADMIN — FAMOTIDINE 20 MG: 10 INJECTION, SOLUTION INTRAVENOUS at 21:09

## 2019-01-01 RX ADMIN — METHYLPREDNISOLONE SODIUM SUCCINATE 40 MG: 40 INJECTION, POWDER, FOR SOLUTION INTRAMUSCULAR; INTRAVENOUS at 05:57

## 2019-01-01 RX ADMIN — METOPROLOL TARTRATE 50 MG: 50 TABLET, FILM COATED ORAL at 20:29

## 2019-01-01 RX ADMIN — HALOPERIDOL LACTATE 3 MG: 5 INJECTION INTRAMUSCULAR at 08:19

## 2019-01-01 RX ADMIN — CIPROFLOXACIN 1 DROP: 3 SOLUTION OPHTHALMIC at 00:05

## 2019-01-01 RX ADMIN — CIPROFLOXACIN 1 DROP: 3 SOLUTION OPHTHALMIC at 00:02

## 2019-01-01 RX ADMIN — SODIUM CHLORIDE: 9 INJECTION, SOLUTION INTRAVENOUS at 10:31

## 2019-01-01 RX ADMIN — METOPROLOL TARTRATE 50 MG: 50 TABLET, FILM COATED ORAL at 21:01

## 2019-01-01 RX ADMIN — OXYCODONE HYDROCHLORIDE 10 MG: 5 TABLET ORAL at 14:16

## 2019-01-01 RX ADMIN — PROPOFOL 20 MCG/KG/MIN: 10 INJECTION, EMULSION INTRAVENOUS at 05:18

## 2019-01-01 RX ADMIN — VANCOMYCIN HYDROCHLORIDE 750 MG: 750 INJECTION, POWDER, LYOPHILIZED, FOR SOLUTION INTRAVENOUS at 20:51

## 2019-01-01 RX ADMIN — PROPOFOL 40 MCG/KG/MIN: 10 INJECTION, EMULSION INTRAVENOUS at 19:16

## 2019-01-01 RX ADMIN — METOPROLOL TARTRATE 100 MG: 50 TABLET, FILM COATED ORAL at 09:03

## 2019-01-01 RX ADMIN — ENOXAPARIN SODIUM 70 MG: 80 INJECTION SUBCUTANEOUS at 08:04

## 2019-01-01 RX ADMIN — ENOXAPARIN SODIUM 70 MG: 80 INJECTION SUBCUTANEOUS at 20:18

## 2019-01-01 RX ADMIN — CALCIUM CHLORIDE 1 G: 100 INJECTION, SOLUTION INTRAVENOUS; INTRAVENTRICULAR at 10:12

## 2019-01-01 RX ADMIN — QUETIAPINE FUMARATE 12.5 MG: 25 TABLET ORAL at 21:03

## 2019-01-01 RX ADMIN — PROPOFOL 50 MCG/KG/MIN: 10 INJECTION, EMULSION INTRAVENOUS at 16:30

## 2019-01-01 RX ADMIN — MEROPENEM 1 G: 1 INJECTION, POWDER, FOR SOLUTION INTRAVENOUS at 04:45

## 2019-01-01 RX ADMIN — Medication 1 CAPSULE: at 16:15

## 2019-01-01 RX ADMIN — OXYCODONE HYDROCHLORIDE 10 MG: 5 TABLET ORAL at 14:49

## 2019-01-01 RX ADMIN — PROPOFOL 30 MCG/KG/MIN: 10 INJECTION, EMULSION INTRAVENOUS at 22:02

## 2019-01-01 RX ADMIN — METOPROLOL TARTRATE 50 MG: 50 TABLET, FILM COATED ORAL at 08:24

## 2019-01-01 RX ADMIN — PROPOFOL 25 MCG/KG/MIN: 10 INJECTION, EMULSION INTRAVENOUS at 06:37

## 2019-01-01 RX ADMIN — Medication 100 MG: at 15:57

## 2019-01-01 RX ADMIN — METOPROLOL TARTRATE 5 MG: 5 INJECTION INTRAVENOUS at 20:27

## 2019-01-01 RX ADMIN — Medication 10 ML: at 08:07

## 2019-01-01 RX ADMIN — METHYLPREDNISOLONE SODIUM SUCCINATE 40 MG: 40 INJECTION, POWDER, FOR SOLUTION INTRAMUSCULAR; INTRAVENOUS at 06:32

## 2019-01-01 RX ADMIN — SUCCINYLCHOLINE CHLORIDE 100 MG: 20 INJECTION, SOLUTION INTRAMUSCULAR; INTRAVENOUS at 18:57

## 2019-01-01 RX ADMIN — RISPERIDONE 0.25 MG: 0.5 TABLET ORAL at 10:22

## 2019-01-01 RX ADMIN — ENOXAPARIN SODIUM 70 MG: 80 INJECTION SUBCUTANEOUS at 08:18

## 2019-01-01 RX ADMIN — AMITRIPTYLINE HYDROCHLORIDE 10 MG: 10 TABLET, FILM COATED ORAL at 19:52

## 2019-01-01 RX ADMIN — FAMOTIDINE 10 MG: 20 TABLET ORAL at 20:39

## 2019-01-01 RX ADMIN — OXYCODONE HYDROCHLORIDE 5 MG: 5 TABLET ORAL at 08:45

## 2019-01-01 RX ADMIN — DESMOPRESSIN ACETATE 40 MG: 0.2 TABLET ORAL at 20:45

## 2019-01-01 RX ADMIN — PROPOFOL 50 MCG/KG/MIN: 10 INJECTION, EMULSION INTRAVENOUS at 14:00

## 2019-01-01 RX ADMIN — SENNOSIDES AND DOCUSATE SODIUM 1 TABLET: 8.6; 5 TABLET ORAL at 08:18

## 2019-01-01 RX ADMIN — METOPROLOL TARTRATE 125 MG: 50 TABLET, FILM COATED ORAL at 20:39

## 2019-01-01 RX ADMIN — METRONIDAZOLE 500 MG: 500 INJECTION, SOLUTION INTRAVENOUS at 02:08

## 2019-01-01 RX ADMIN — LORAZEPAM 0.5 MG: 2 INJECTION INTRAMUSCULAR; INTRAVENOUS at 02:15

## 2019-01-01 RX ADMIN — Medication 10 ML: at 21:03

## 2019-01-01 RX ADMIN — ONDANSETRON 4 MG: 2 INJECTION INTRAMUSCULAR; INTRAVENOUS at 20:39

## 2019-01-01 RX ADMIN — CIPROFLOXACIN 400 MG: 2 INJECTION, SOLUTION INTRAVENOUS at 05:00

## 2019-01-01 RX ADMIN — METOPROLOL TARTRATE 50 MG: 50 TABLET, FILM COATED ORAL at 20:43

## 2019-01-01 RX ADMIN — DILTIAZEM HYDROCHLORIDE 15 MG: 5 INJECTION INTRAVENOUS at 19:55

## 2019-01-01 RX ADMIN — METRONIDAZOLE 500 MG: 500 INJECTION, SOLUTION INTRAVENOUS at 13:53

## 2019-01-01 RX ADMIN — DESMOPRESSIN ACETATE 40 MG: 0.2 TABLET ORAL at 20:39

## 2019-01-01 RX ADMIN — TOPIRAMATE 50 MG: 25 TABLET, FILM COATED ORAL at 09:02

## 2019-01-01 RX ADMIN — LORAZEPAM 0.5 MG: 2 INJECTION INTRAMUSCULAR; INTRAVENOUS at 10:06

## 2019-01-01 RX ADMIN — DEXTROSE MONOHYDRATE: 50 INJECTION, SOLUTION INTRAVENOUS at 01:56

## 2019-01-01 RX ADMIN — METRONIDAZOLE 500 MG: 500 INJECTION, SOLUTION INTRAVENOUS at 06:05

## 2019-01-01 RX ADMIN — DEXMEDETOMIDINE 0.3 MCG/KG/HR: 100 INJECTION, SOLUTION, CONCENTRATE INTRAVENOUS at 10:56

## 2019-01-01 RX ADMIN — PROPOFOL 30 MCG/KG/MIN: 10 INJECTION, EMULSION INTRAVENOUS at 08:32

## 2019-01-01 RX ADMIN — INSULIN LISPRO 1 UNITS: 100 INJECTION, SOLUTION INTRAVENOUS; SUBCUTANEOUS at 01:51

## 2019-01-01 RX ADMIN — TOPIRAMATE 50 MG: 25 TABLET, FILM COATED ORAL at 20:45

## 2019-01-01 RX ADMIN — METOPROLOL TARTRATE 100 MG: 50 TABLET, FILM COATED ORAL at 21:02

## 2019-01-01 RX ADMIN — APIXABAN 5 MG: 5 TABLET, FILM COATED ORAL at 09:03

## 2019-01-01 RX ADMIN — SODIUM CHLORIDE: 4.5 INJECTION, SOLUTION INTRAVENOUS at 10:02

## 2019-01-01 RX ADMIN — SODIUM CHLORIDE 1000 ML: 9 INJECTION, SOLUTION INTRAVENOUS at 06:10

## 2019-01-01 RX ADMIN — OXYCODONE HYDROCHLORIDE 5 MG: 5 TABLET ORAL at 21:09

## 2019-01-01 RX ADMIN — METOPROLOL TARTRATE 50 MG: 50 TABLET, FILM COATED ORAL at 20:30

## 2019-01-01 RX ADMIN — ENOXAPARIN SODIUM 70 MG: 80 INJECTION SUBCUTANEOUS at 08:45

## 2019-01-01 RX ADMIN — Medication 10 ML: at 01:11

## 2019-01-01 RX ADMIN — CIPROFLOXACIN 400 MG: 2 INJECTION, SOLUTION INTRAVENOUS at 04:01

## 2019-01-01 RX ADMIN — INSULIN LISPRO 1 UNITS: 100 INJECTION, SOLUTION INTRAVENOUS; SUBCUTANEOUS at 14:38

## 2019-01-01 RX ADMIN — DEXMEDETOMIDINE 0.4 MCG/KG/HR: 100 INJECTION, SOLUTION, CONCENTRATE INTRAVENOUS at 01:18

## 2019-01-01 RX ADMIN — TOPIRAMATE 50 MG: 25 TABLET, FILM COATED ORAL at 09:39

## 2019-01-01 RX ADMIN — PROPOFOL 30 MCG/KG/MIN: 10 INJECTION, EMULSION INTRAVENOUS at 04:06

## 2019-01-01 RX ADMIN — Medication 1 CAPSULE: at 09:48

## 2019-01-01 RX ADMIN — DEXMEDETOMIDINE 1.4 MCG/KG/HR: 100 INJECTION, SOLUTION, CONCENTRATE INTRAVENOUS at 06:34

## 2019-01-01 RX ADMIN — MAGNESIUM SULFATE HEPTAHYDRATE 2 G: 40 INJECTION, SOLUTION INTRAVENOUS at 07:43

## 2019-01-01 RX ADMIN — CIPROFLOXACIN 400 MG: 2 INJECTION, SOLUTION INTRAVENOUS at 17:03

## 2019-01-01 RX ADMIN — CIPROFLOXACIN 1 DROP: 3 SOLUTION OPHTHALMIC at 18:12

## 2019-01-01 RX ADMIN — SODIUM CHLORIDE: 9 INJECTION, SOLUTION INTRAVENOUS at 17:00

## 2019-01-01 RX ADMIN — OXYCODONE HYDROCHLORIDE 5 MG: 5 TABLET ORAL at 16:15

## 2019-01-01 RX ADMIN — LORAZEPAM 0.5 MG: 2 INJECTION INTRAMUSCULAR; INTRAVENOUS at 05:53

## 2019-01-01 RX ADMIN — MEROPENEM 1000 MG: 1 INJECTION, POWDER, FOR SOLUTION INTRAVENOUS at 17:39

## 2019-01-01 RX ADMIN — CIPROFLOXACIN 1 DROP: 3 SOLUTION OPHTHALMIC at 12:16

## 2019-01-01 RX ADMIN — METRONIDAZOLE 500 MG: 500 INJECTION, SOLUTION INTRAVENOUS at 21:27

## 2019-01-01 RX ADMIN — HEPARIN SODIUM 8 UNITS/KG/HR: 10000 INJECTION, SOLUTION INTRAVENOUS at 17:40

## 2019-01-01 RX ADMIN — FAMOTIDINE 20 MG: 10 INJECTION, SOLUTION INTRAVENOUS at 09:42

## 2019-01-01 RX ADMIN — METHYLPREDNISOLONE SODIUM SUCCINATE 40 MG: 40 INJECTION, POWDER, FOR SOLUTION INTRAMUSCULAR; INTRAVENOUS at 23:35

## 2019-01-01 RX ADMIN — TOPIRAMATE 50 MG: 25 TABLET, FILM COATED ORAL at 08:51

## 2019-01-01 RX ADMIN — DEXTROSE MONOHYDRATE: 50 INJECTION, SOLUTION INTRAVENOUS at 08:47

## 2019-01-01 RX ADMIN — FAMOTIDINE 20 MG: 10 INJECTION, SOLUTION INTRAVENOUS at 09:19

## 2019-01-01 RX ADMIN — POTASSIUM CHLORIDE 20 MEQ: 29.8 INJECTION, SOLUTION INTRAVENOUS at 06:56

## 2019-01-01 RX ADMIN — WARFARIN SODIUM 4 MG: 2 TABLET ORAL at 17:51

## 2019-01-01 RX ADMIN — PROPOFOL 40 MCG/KG/MIN: 10 INJECTION, EMULSION INTRAVENOUS at 09:08

## 2019-01-01 RX ADMIN — DILTIAZEM HYDROCHLORIDE 10 MG: 5 INJECTION INTRAVENOUS at 21:07

## 2019-01-01 RX ADMIN — LORAZEPAM 0.5 MG: 2 INJECTION INTRAMUSCULAR; INTRAVENOUS at 16:15

## 2019-01-01 RX ADMIN — PROPOFOL 20 MG: 10 INJECTION, EMULSION INTRAVENOUS at 17:20

## 2019-01-01 RX ADMIN — PROPRANOLOL HYDROCHLORIDE 20 MG: 20 TABLET ORAL at 10:00

## 2019-01-01 RX ADMIN — PROPRANOLOL HYDROCHLORIDE 10 MG: 20 TABLET ORAL at 08:40

## 2019-01-01 RX ADMIN — INSULIN LISPRO 1 UNITS: 100 INJECTION, SOLUTION INTRAVENOUS; SUBCUTANEOUS at 16:05

## 2019-01-01 RX ADMIN — DEXMEDETOMIDINE 0.2 MCG/KG/HR: 100 INJECTION, SOLUTION, CONCENTRATE INTRAVENOUS at 12:01

## 2019-01-01 RX ADMIN — POTASSIUM CHLORIDE 20 MEQ: 29.8 INJECTION, SOLUTION INTRAVENOUS at 06:12

## 2019-01-01 RX ADMIN — Medication 1 CAPSULE: at 09:18

## 2019-01-01 RX ADMIN — CIPROFLOXACIN 1 DROP: 3 SOLUTION OPHTHALMIC at 06:32

## 2019-01-01 RX ADMIN — Medication 4 PUFF: at 08:21

## 2019-01-01 RX ADMIN — FAMOTIDINE 20 MG: 10 INJECTION, SOLUTION INTRAVENOUS at 08:16

## 2019-01-01 RX ADMIN — DIGOXIN 0.12 MG: 125 TABLET ORAL at 08:19

## 2019-01-01 RX ADMIN — Medication 10 ML: at 09:43

## 2019-01-01 RX ADMIN — FAMOTIDINE 20 MG: 10 INJECTION, SOLUTION INTRAVENOUS at 08:23

## 2019-01-01 RX ADMIN — OXYCODONE HYDROCHLORIDE 5 MG: 5 TABLET ORAL at 09:42

## 2019-01-01 RX ADMIN — OXYCODONE HYDROCHLORIDE 5 MG: 5 TABLET ORAL at 09:47

## 2019-01-01 RX ADMIN — POTASSIUM CHLORIDE 20 MEQ: 29.8 INJECTION, SOLUTION INTRAVENOUS at 08:46

## 2019-01-01 RX ADMIN — DIGOXIN 0.12 MG: 125 TABLET ORAL at 09:03

## 2019-01-01 RX ADMIN — ACETAMINOPHEN 650 MG: 325 TABLET, FILM COATED ORAL at 14:02

## 2019-01-01 RX ADMIN — MORPHINE SULFATE 4 MG: 4 INJECTION, SOLUTION INTRAMUSCULAR; INTRAVENOUS at 04:00

## 2019-01-01 RX ADMIN — METRONIDAZOLE 500 MG: 500 INJECTION, SOLUTION INTRAVENOUS at 06:03

## 2019-01-01 RX ADMIN — HEPARIN SODIUM 2630 UNITS: 1000 INJECTION INTRAVENOUS; SUBCUTANEOUS at 05:55

## 2019-01-01 RX ADMIN — CIPROFLOXACIN 1 DROP: 3 SOLUTION OPHTHALMIC at 05:57

## 2019-01-01 RX ADMIN — TOPIRAMATE 50 MG: 25 TABLET, FILM COATED ORAL at 08:24

## 2019-01-01 RX ADMIN — Medication 10 ML: at 20:10

## 2019-01-01 RX ADMIN — MEROPENEM 1000 MG: 1 INJECTION, POWDER, FOR SOLUTION INTRAVENOUS at 16:24

## 2019-01-01 RX ADMIN — APIXABAN 5 MG: 5 TABLET, FILM COATED ORAL at 08:20

## 2019-01-01 RX ADMIN — ENOXAPARIN SODIUM 70 MG: 80 INJECTION SUBCUTANEOUS at 21:30

## 2019-01-01 RX ADMIN — DEXMEDETOMIDINE 0.8 MCG/KG/HR: 100 INJECTION, SOLUTION, CONCENTRATE INTRAVENOUS at 23:37

## 2019-01-01 RX ADMIN — Medication 1 CAPSULE: at 08:45

## 2019-01-01 RX ADMIN — FAMOTIDINE 20 MG: 10 INJECTION, SOLUTION INTRAVENOUS at 21:32

## 2019-01-01 RX ADMIN — MEROPENEM 1 G: 1 INJECTION, POWDER, FOR SOLUTION INTRAVENOUS at 05:21

## 2019-01-01 RX ADMIN — FENTANYL CITRATE 1.5 MCG/KG/HR: 50 INJECTION INTRAVENOUS at 19:53

## 2019-01-01 RX ADMIN — Medication 10 ML: at 20:59

## 2019-01-01 RX ADMIN — OXYCODONE HYDROCHLORIDE 10 MG: 5 TABLET ORAL at 21:03

## 2019-01-01 RX ADMIN — METOPROLOL TARTRATE 50 MG: 50 TABLET, FILM COATED ORAL at 21:09

## 2019-01-01 RX ADMIN — CIPROFLOXACIN 1 DROP: 3 SOLUTION OPHTHALMIC at 13:25

## 2019-01-01 RX ADMIN — METRONIDAZOLE 500 MG: 500 INJECTION, SOLUTION INTRAVENOUS at 21:36

## 2019-01-01 RX ADMIN — METRONIDAZOLE 500 MG: 500 INJECTION, SOLUTION INTRAVENOUS at 22:03

## 2019-01-01 RX ADMIN — CHLORHEXIDINE GLUCONATE 15 ML: 1.2 RINSE ORAL at 09:25

## 2019-01-01 RX ADMIN — PROPOFOL 25 MCG/KG/MIN: 10 INJECTION, EMULSION INTRAVENOUS at 14:29

## 2019-01-01 RX ADMIN — ALTEPLASE 2 MG: 2.2 INJECTION, POWDER, LYOPHILIZED, FOR SOLUTION INTRAVENOUS at 18:12

## 2019-01-01 RX ADMIN — METOPROLOL TARTRATE 50 MG: 50 TABLET, FILM COATED ORAL at 21:32

## 2019-01-01 RX ADMIN — FENTANYL CITRATE 1 MCG/KG/HR: 50 INJECTION INTRAVENOUS at 13:44

## 2019-01-01 RX ADMIN — APIXABAN 5 MG: 5 TABLET, FILM COATED ORAL at 21:38

## 2019-01-01 RX ADMIN — TOPIRAMATE 50 MG: 25 TABLET, FILM COATED ORAL at 20:19

## 2019-01-01 RX ADMIN — METRONIDAZOLE 500 MG: 500 INJECTION, SOLUTION INTRAVENOUS at 09:10

## 2019-01-01 RX ADMIN — Medication 6 PUFF: at 03:52

## 2019-01-01 RX ADMIN — OXYCODONE HYDROCHLORIDE 10 MG: 5 TABLET ORAL at 08:20

## 2019-01-01 RX ADMIN — TOPIRAMATE 50 MG: 25 TABLET, FILM COATED ORAL at 08:19

## 2019-01-01 RX ADMIN — FAMOTIDINE 20 MG: 10 INJECTION, SOLUTION INTRAVENOUS at 20:18

## 2019-01-01 RX ADMIN — OXYCODONE HYDROCHLORIDE 10 MG: 5 TABLET ORAL at 22:59

## 2019-01-01 RX ADMIN — LORAZEPAM 0.5 MG: 2 INJECTION INTRAMUSCULAR; INTRAVENOUS at 14:34

## 2019-01-01 RX ADMIN — PROPOFOL 30 MG: 10 INJECTION, EMULSION INTRAVENOUS at 17:10

## 2019-01-01 RX ADMIN — Medication 6 PUFF: at 20:08

## 2019-01-01 RX ADMIN — Medication 10 ML: at 20:57

## 2019-01-01 RX ADMIN — POTASSIUM PHOSPHATE, MONOBASIC AND POTASSIUM PHOSPHATE, DIBASIC 20 MMOL: 224; 236 INJECTION, SOLUTION, CONCENTRATE INTRAVENOUS at 10:58

## 2019-01-01 RX ADMIN — LORAZEPAM 0.5 MG: 0.5 TABLET ORAL at 21:03

## 2019-01-01 RX ADMIN — LORAZEPAM 0.5 MG: 0.5 TABLET ORAL at 06:32

## 2019-01-01 RX ADMIN — PROPOFOL 50 MCG/KG/MIN: 10 INJECTION, EMULSION INTRAVENOUS at 23:02

## 2019-01-01 RX ADMIN — TOPIRAMATE 50 MG: 25 TABLET, FILM COATED ORAL at 09:15

## 2019-01-01 RX ADMIN — METRONIDAZOLE 500 MG: 500 INJECTION, SOLUTION INTRAVENOUS at 05:55

## 2019-01-01 RX ADMIN — CIPROFLOXACIN 1 DROP: 3 SOLUTION OPHTHALMIC at 17:35

## 2019-01-01 RX ADMIN — PROPOFOL 30 MCG/KG/MIN: 10 INJECTION, EMULSION INTRAVENOUS at 16:38

## 2019-01-01 RX ADMIN — TOPIRAMATE 50 MG: 25 TABLET, FILM COATED ORAL at 10:01

## 2019-01-01 RX ADMIN — PROPRANOLOL HYDROCHLORIDE 20 MG: 20 TABLET ORAL at 14:29

## 2019-01-01 RX ADMIN — SODIUM CHLORIDE: 9 INJECTION, SOLUTION INTRAVENOUS at 10:33

## 2019-01-01 RX ADMIN — Medication 10 ML: at 08:28

## 2019-01-01 RX ADMIN — SENNOSIDES AND DOCUSATE SODIUM 1 TABLET: 8.6; 5 TABLET ORAL at 09:02

## 2019-01-01 RX ADMIN — DEXTROSE MONOHYDRATE: 50 INJECTION, SOLUTION INTRAVENOUS at 12:52

## 2019-01-01 RX ADMIN — HALOPERIDOL LACTATE 3 MG: 5 INJECTION INTRAMUSCULAR at 10:21

## 2019-01-01 RX ADMIN — DIGOXIN 125 MCG: 0.25 INJECTION INTRAMUSCULAR; INTRAVENOUS at 08:40

## 2019-01-01 RX ADMIN — OXYCODONE HYDROCHLORIDE 10 MG: 5 TABLET ORAL at 10:55

## 2019-01-01 RX ADMIN — OXYCODONE HYDROCHLORIDE 5 MG: 5 TABLET ORAL at 20:30

## 2019-01-01 RX ADMIN — FAMOTIDINE 20 MG: 10 INJECTION, SOLUTION INTRAVENOUS at 20:51

## 2019-01-01 RX ADMIN — OXYCODONE HYDROCHLORIDE 5 MG: 5 TABLET ORAL at 08:47

## 2019-01-01 RX ADMIN — LORAZEPAM 0.5 MG: 2 INJECTION INTRAMUSCULAR; INTRAVENOUS at 17:35

## 2019-01-01 RX ADMIN — SODIUM CHLORIDE: 9 INJECTION, SOLUTION INTRAVENOUS at 09:21

## 2019-01-01 RX ADMIN — DESMOPRESSIN ACETATE 40 MG: 0.2 TABLET ORAL at 21:02

## 2019-01-01 RX ADMIN — ASCORBIC ACID, VITAMIN A PALMITATE, CHOLECALCIFEROL, THIAMINE HYDROCHLORIDE, RIBOFLAVIN-5 PHOSPHATE SODIUM, PYRIDOXINE HYDROCHLORIDE, NIACINAMIDE, DEXPANTHENOL, ALPHA-TOCOPHEROL ACETATE, VITAMIN K1, FOLIC ACID, BIOTIN, CYANOCOBALAMIN: 200; 3300; 200; 6; 3.6; 6; 40; 15; 10; 150; 600; 60; 5 INJECTION, SOLUTION INTRAVENOUS at 18:12

## 2019-01-01 RX ADMIN — CIPROFLOXACIN 1 DROP: 3 SOLUTION OPHTHALMIC at 12:00

## 2019-01-01 RX ADMIN — SODIUM CHLORIDE: 4.5 INJECTION, SOLUTION INTRAVENOUS at 20:12

## 2019-01-01 RX ADMIN — METHYLPREDNISOLONE SODIUM SUCCINATE 40 MG: 40 INJECTION, POWDER, FOR SOLUTION INTRAMUSCULAR; INTRAVENOUS at 18:25

## 2019-01-01 RX ADMIN — METOPROLOL TARTRATE 50 MG: 50 TABLET, FILM COATED ORAL at 21:50

## 2019-01-01 RX ADMIN — METOPROLOL TARTRATE 100 MG: 50 TABLET, FILM COATED ORAL at 08:20

## 2019-01-01 RX ADMIN — CIPROFLOXACIN 1 DROP: 3 SOLUTION OPHTHALMIC at 18:17

## 2019-01-01 RX ADMIN — DILTIAZEM HYDROCHLORIDE 5 MG/HR: 5 INJECTION INTRAVENOUS at 19:55

## 2019-01-01 RX ADMIN — Medication 10 ML: at 08:33

## 2019-01-01 RX ADMIN — INSULIN LISPRO 1 UNITS: 100 INJECTION, SOLUTION INTRAVENOUS; SUBCUTANEOUS at 11:27

## 2019-01-01 RX ADMIN — PROPOFOL 10 MCG/KG/MIN: 10 INJECTION, EMULSION INTRAVENOUS at 16:15

## 2019-01-01 RX ADMIN — Medication 10 ML: at 09:51

## 2019-01-01 RX ADMIN — POTASSIUM PHOSPHATE, MONOBASIC AND POTASSIUM PHOSPHATE, DIBASIC 20 MMOL: 224; 236 INJECTION, SOLUTION, CONCENTRATE INTRAVENOUS at 10:12

## 2019-01-01 RX ADMIN — PROPOFOL 35 MCG/KG/MIN: 10 INJECTION, EMULSION INTRAVENOUS at 04:45

## 2019-01-01 RX ADMIN — METRONIDAZOLE 500 MG: 500 INJECTION, SOLUTION INTRAVENOUS at 14:53

## 2019-01-01 RX ADMIN — FENTANYL CITRATE 1 MCG/KG/HR: 50 INJECTION INTRAVENOUS at 01:52

## 2019-01-01 RX ADMIN — ESMOLOL HYDROCHLORIDE 20 MG: 10 INJECTION, SOLUTION INTRAVENOUS at 15:45

## 2019-01-01 RX ADMIN — FENTANYL CITRATE 1 MCG/KG/HR: 50 INJECTION INTRAVENOUS at 05:09

## 2019-01-01 RX ADMIN — TOPIRAMATE 50 MG: 25 TABLET, FILM COATED ORAL at 08:46

## 2019-01-01 RX ADMIN — HEPARIN SODIUM 2630 UNITS: 1000 INJECTION INTRAVENOUS; SUBCUTANEOUS at 21:49

## 2019-01-01 RX ADMIN — ENOXAPARIN SODIUM 70 MG: 80 INJECTION SUBCUTANEOUS at 20:58

## 2019-01-01 RX ADMIN — SODIUM CHLORIDE: 9 INJECTION, SOLUTION INTRAVENOUS at 02:27

## 2019-01-01 RX ADMIN — LORAZEPAM 0.5 MG: 2 INJECTION INTRAMUSCULAR; INTRAVENOUS at 03:00

## 2019-01-01 RX ADMIN — METOPROLOL TARTRATE 100 MG: 50 TABLET, FILM COATED ORAL at 09:00

## 2019-01-01 RX ADMIN — PROPOFOL 35 MCG/KG/MIN: 10 INJECTION, EMULSION INTRAVENOUS at 05:50

## 2019-01-01 RX ADMIN — IOPAMIDOL 20 ML: 755 INJECTION, SOLUTION INTRAVENOUS at 12:57

## 2019-01-01 RX ADMIN — INSULIN LISPRO 1 UNITS: 100 INJECTION, SOLUTION INTRAVENOUS; SUBCUTANEOUS at 10:04

## 2019-01-01 RX ADMIN — FAMOTIDINE 10 MG: 20 TABLET ORAL at 21:02

## 2019-01-01 RX ADMIN — Medication 10 ML: at 20:42

## 2019-01-01 RX ADMIN — FAMOTIDINE 20 MG: 10 INJECTION, SOLUTION INTRAVENOUS at 21:30

## 2019-01-01 RX ADMIN — DEXMEDETOMIDINE 1 MCG/KG/HR: 100 INJECTION, SOLUTION, CONCENTRATE INTRAVENOUS at 22:40

## 2019-01-01 RX ADMIN — METRONIDAZOLE 500 MG: 500 INJECTION, SOLUTION INTRAVENOUS at 13:58

## 2019-01-01 RX ADMIN — PROPRANOLOL HYDROCHLORIDE 10 MG: 20 TABLET ORAL at 08:24

## 2019-01-01 RX ADMIN — VENLAFAXINE HYDROCHLORIDE 150 MG: 150 CAPSULE, EXTENDED RELEASE ORAL at 09:03

## 2019-01-01 RX ADMIN — DILTIAZEM HYDROCHLORIDE 5 MG/ML: 5 INJECTION INTRAVENOUS at 17:52

## 2019-01-01 RX ADMIN — PROPOFOL 50 MCG/KG/MIN: 10 INJECTION, EMULSION INTRAVENOUS at 19:44

## 2019-01-01 RX ADMIN — VENLAFAXINE HYDROCHLORIDE 150 MG: 150 CAPSULE, EXTENDED RELEASE ORAL at 15:35

## 2019-01-01 RX ADMIN — METRONIDAZOLE 500 MG: 500 INJECTION, SOLUTION INTRAVENOUS at 01:26

## 2019-01-01 RX ADMIN — TOPIRAMATE 50 MG: 25 TABLET, FILM COATED ORAL at 08:13

## 2019-01-01 RX ADMIN — ENOXAPARIN SODIUM 70 MG: 80 INJECTION SUBCUTANEOUS at 10:47

## 2019-01-01 RX ADMIN — TOPIRAMATE 50 MG: 25 TABLET, FILM COATED ORAL at 09:47

## 2019-01-01 RX ADMIN — SODIUM CHLORIDE: 9 INJECTION, SOLUTION INTRAVENOUS at 13:43

## 2019-01-01 RX ADMIN — METRONIDAZOLE 500 MG: 500 INJECTION, SOLUTION INTRAVENOUS at 05:28

## 2019-01-01 RX ADMIN — DIGOXIN 125 MCG: 0.25 INJECTION INTRAMUSCULAR; INTRAVENOUS at 08:23

## 2019-01-01 RX ADMIN — OXYCODONE HYDROCHLORIDE 5 MG: 5 TABLET ORAL at 14:34

## 2019-01-01 RX ADMIN — LORAZEPAM 0.5 MG: 0.5 TABLET ORAL at 15:21

## 2019-01-01 RX ADMIN — Medication 10 ML: at 21:18

## 2019-01-01 RX ADMIN — APIXABAN 5 MG: 5 TABLET, FILM COATED ORAL at 21:03

## 2019-01-01 RX ADMIN — LORAZEPAM 0.5 MG: 0.5 TABLET ORAL at 16:29

## 2019-01-01 RX ADMIN — DEXMEDETOMIDINE 0.5 MCG/KG/HR: 100 INJECTION, SOLUTION, CONCENTRATE INTRAVENOUS at 06:31

## 2019-01-01 RX ADMIN — SODIUM CHLORIDE 250 ML: 9 INJECTION, SOLUTION INTRAVENOUS at 06:20

## 2019-01-01 RX ADMIN — TIOTROPIUM BROMIDE INHALATION SPRAY 2 PUFF: 3.12 SPRAY, METERED RESPIRATORY (INHALATION) at 08:13

## 2019-01-01 RX ADMIN — QUETIAPINE FUMARATE 12.5 MG: 25 TABLET ORAL at 20:14

## 2019-01-01 RX ADMIN — MORPHINE SULFATE 4 MG: 4 INJECTION, SOLUTION INTRAMUSCULAR; INTRAVENOUS at 20:22

## 2019-01-01 RX ADMIN — ROCURONIUM BROMIDE 25 MG: 10 INJECTION, SOLUTION INTRAVENOUS at 15:12

## 2019-01-01 RX ADMIN — Medication 10 ML: at 09:08

## 2019-01-01 RX ADMIN — APIXABAN 5 MG: 5 TABLET, FILM COATED ORAL at 09:47

## 2019-01-01 RX ADMIN — Medication 10 ML: at 22:18

## 2019-01-01 RX ADMIN — APIXABAN 5 MG: 5 TABLET, FILM COATED ORAL at 23:00

## 2019-01-01 RX ADMIN — OXYCODONE HYDROCHLORIDE 5 MG: 5 TABLET ORAL at 20:29

## 2019-01-01 RX ADMIN — MAGNESIUM SULFATE HEPTAHYDRATE 2 G: 40 INJECTION, SOLUTION INTRAVENOUS at 11:29

## 2019-01-01 RX ADMIN — CIPROFLOXACIN 1 DROP: 3 SOLUTION OPHTHALMIC at 18:36

## 2019-01-01 RX ADMIN — Medication 10 ML: at 08:20

## 2019-01-01 RX ADMIN — HEPARIN SODIUM 20 UNITS/KG/HR: 10000 INJECTION, SOLUTION INTRAVENOUS at 02:50

## 2019-01-01 RX ADMIN — ACETAMINOPHEN 650 MG: 325 TABLET, FILM COATED ORAL at 08:20

## 2019-01-01 RX ADMIN — INSULIN LISPRO 1 UNITS: 100 INJECTION, SOLUTION INTRAVENOUS; SUBCUTANEOUS at 05:57

## 2019-01-01 RX ADMIN — DEXTROSE MONOHYDRATE: 50 INJECTION, SOLUTION INTRAVENOUS at 03:22

## 2019-01-01 RX ADMIN — Medication 10 ML: at 21:33

## 2019-01-01 RX ADMIN — Medication 10 ML: at 08:46

## 2019-01-01 RX ADMIN — DIGOXIN 125 MCG: 0.25 INJECTION INTRAMUSCULAR; INTRAVENOUS at 08:06

## 2019-01-01 RX ADMIN — IOHEXOL 50 ML: 240 INJECTION, SOLUTION INTRATHECAL; INTRAVASCULAR; INTRAVENOUS; ORAL at 15:53

## 2019-01-01 RX ADMIN — LORAZEPAM 0.5 MG: 2 INJECTION INTRAMUSCULAR; INTRAVENOUS at 06:01

## 2019-01-01 RX ADMIN — FAMOTIDINE 20 MG: 10 INJECTION, SOLUTION INTRAVENOUS at 21:50

## 2019-01-01 RX ADMIN — DEXTROSE MONOHYDRATE: 50 INJECTION, SOLUTION INTRAVENOUS at 16:30

## 2019-01-01 RX ADMIN — POTASSIUM PHOSPHATE, MONOBASIC AND POTASSIUM PHOSPHATE, DIBASIC 20 MMOL: 224; 236 INJECTION, SOLUTION, CONCENTRATE INTRAVENOUS at 09:57

## 2019-01-01 RX ADMIN — MEROPENEM 1000 MG: 1 INJECTION, POWDER, FOR SOLUTION INTRAVENOUS at 04:35

## 2019-01-01 RX ADMIN — PROPOFOL 30 MG: 10 INJECTION, EMULSION INTRAVENOUS at 17:18

## 2019-01-01 RX ADMIN — METHYLPREDNISOLONE SODIUM SUCCINATE 40 MG: 40 INJECTION, POWDER, FOR SOLUTION INTRAMUSCULAR; INTRAVENOUS at 06:03

## 2019-01-01 RX ADMIN — Medication 10 ML: at 21:00

## 2019-01-01 RX ADMIN — METOPROLOL TARTRATE 125 MG: 50 TABLET, FILM COATED ORAL at 02:34

## 2019-01-01 RX ADMIN — OXYCODONE HYDROCHLORIDE 5 MG: 5 TABLET ORAL at 08:17

## 2019-01-01 RX ADMIN — OXYCODONE HYDROCHLORIDE 5 MG: 5 TABLET ORAL at 21:12

## 2019-01-01 RX ADMIN — SENNOSIDES AND DOCUSATE SODIUM 1 TABLET: 8.6; 5 TABLET ORAL at 09:47

## 2019-01-01 RX ADMIN — PROPOFOL 50 MCG/KG/MIN: 10 INJECTION, EMULSION INTRAVENOUS at 08:47

## 2019-01-01 RX ADMIN — DIGOXIN 125 MCG: 0.25 INJECTION INTRAMUSCULAR; INTRAVENOUS at 08:46

## 2019-01-01 RX ADMIN — LORAZEPAM 0.5 MG: 2 INJECTION INTRAMUSCULAR; INTRAVENOUS at 02:13

## 2019-01-01 RX ADMIN — Medication 10 ML: at 20:48

## 2019-01-01 RX ADMIN — FAMOTIDINE 20 MG: 10 INJECTION, SOLUTION INTRAVENOUS at 08:21

## 2019-01-01 RX ADMIN — QUETIAPINE FUMARATE 12.5 MG: 25 TABLET ORAL at 20:39

## 2019-01-01 RX ADMIN — TOPIRAMATE 50 MG: 25 TABLET, FILM COATED ORAL at 21:30

## 2019-01-01 RX ADMIN — OXYCODONE HYDROCHLORIDE 5 MG: 5 TABLET ORAL at 20:40

## 2019-01-01 RX ADMIN — OXYCODONE HYDROCHLORIDE 5 MG: 5 TABLET ORAL at 08:24

## 2019-01-01 RX ADMIN — Medication 10 ML: at 02:36

## 2019-01-01 RX ADMIN — METHYLPREDNISOLONE SODIUM SUCCINATE 40 MG: 40 INJECTION, POWDER, FOR SOLUTION INTRAMUSCULAR; INTRAVENOUS at 18:01

## 2019-01-01 RX ADMIN — ENOXAPARIN SODIUM 70 MG: 80 INJECTION SUBCUTANEOUS at 09:42

## 2019-01-01 RX ADMIN — METRONIDAZOLE 500 MG: 500 INJECTION, SOLUTION INTRAVENOUS at 14:22

## 2019-01-01 RX ADMIN — Medication 1 CAPSULE: at 09:42

## 2019-01-01 RX ADMIN — METHYLPREDNISOLONE SODIUM SUCCINATE 40 MG: 40 INJECTION, POWDER, FOR SOLUTION INTRAMUSCULAR; INTRAVENOUS at 01:09

## 2019-01-01 RX ADMIN — DEXTROSE MONOHYDRATE: 50 INJECTION, SOLUTION INTRAVENOUS at 09:57

## 2019-01-01 RX ADMIN — CIPROFLOXACIN 400 MG: 2 INJECTION, SOLUTION INTRAVENOUS at 18:53

## 2019-01-01 RX ADMIN — APIXABAN 5 MG: 5 TABLET, FILM COATED ORAL at 02:35

## 2019-01-01 RX ADMIN — SODIUM CHLORIDE: 4.5 INJECTION, SOLUTION INTRAVENOUS at 00:04

## 2019-01-01 RX ADMIN — HEPARIN SODIUM 2630 UNITS: 1000 INJECTION INTRAVENOUS; SUBCUTANEOUS at 23:21

## 2019-01-01 RX ADMIN — CIPROFLOXACIN 1 DROP: 3 SOLUTION OPHTHALMIC at 23:13

## 2019-01-01 RX ADMIN — DEXMEDETOMIDINE 1 MCG/KG/HR: 100 INJECTION, SOLUTION, CONCENTRATE INTRAVENOUS at 17:01

## 2019-01-01 RX ADMIN — LEUCINE, PHENYLALANINE, LYSINE, METHIONINE, ISOLEUCINE, VALINE, HISTIDINE, THREONINE, TRYPTOPHAN, ALANINE, GLYCINE, ARGININE, PROLINE, SERINE, TYROSINE, SODIUM ACETATE, DIBASIC POTASSIUM PHOSPHATE, MAGNESIUM CHLORIDE, SODIUM CHLORIDE, CALCIUM CHLORIDE, DEXTROSE
365; 280; 290; 200; 300; 290; 240; 210; 90; 1035; 515; 575; 340; 250; 20; 340; 261; 51; 59; 33; 20 INJECTION INTRAVENOUS at 17:46

## 2019-01-01 RX ADMIN — HEPARIN SODIUM 18 UNITS/KG/HR: 10000 INJECTION, SOLUTION INTRAVENOUS at 15:06

## 2019-01-01 RX ADMIN — METOPROLOL TARTRATE 50 MG: 50 TABLET, FILM COATED ORAL at 09:43

## 2019-01-01 RX ADMIN — HEPARIN SODIUM 12 UNITS/KG/HR: 10000 INJECTION, SOLUTION INTRAVENOUS at 21:29

## 2019-01-01 RX ADMIN — ONDANSETRON 4 MG: 2 INJECTION INTRAMUSCULAR; INTRAVENOUS at 15:04

## 2019-01-01 RX ADMIN — FAMOTIDINE 20 MG: 10 INJECTION, SOLUTION INTRAVENOUS at 20:08

## 2019-01-01 RX ADMIN — FENTANYL CITRATE 1.5 MCG/KG/HR: 50 INJECTION INTRAVENOUS at 05:18

## 2019-01-01 RX ADMIN — Medication 5 MG: at 18:20

## 2019-01-01 RX ADMIN — FAMOTIDINE 20 MG: 10 INJECTION, SOLUTION INTRAVENOUS at 08:52

## 2019-01-01 RX ADMIN — PROPOFOL 20 MCG/KG/MIN: 10 INJECTION, EMULSION INTRAVENOUS at 19:52

## 2019-01-01 RX ADMIN — METHYLPREDNISOLONE SODIUM SUCCINATE 40 MG: 40 INJECTION, POWDER, FOR SOLUTION INTRAMUSCULAR; INTRAVENOUS at 17:50

## 2019-01-01 RX ADMIN — DEXTROSE MONOHYDRATE 100 MG: 50 INJECTION, SOLUTION INTRAVENOUS at 21:33

## 2019-01-01 RX ADMIN — METOPROLOL TARTRATE 50 MG: 50 TABLET, FILM COATED ORAL at 08:51

## 2019-01-01 RX ADMIN — SENNOSIDES AND DOCUSATE SODIUM 1 TABLET: 8.6; 5 TABLET ORAL at 08:20

## 2019-01-01 RX ADMIN — METOPROLOL TARTRATE 50 MG: 50 TABLET, FILM COATED ORAL at 08:13

## 2019-01-01 RX ADMIN — ONDANSETRON 4 MG: 2 INJECTION INTRAMUSCULAR; INTRAVENOUS at 05:14

## 2019-01-01 RX ADMIN — LORAZEPAM 0.5 MG: 2 INJECTION INTRAMUSCULAR; INTRAVENOUS at 06:39

## 2019-01-01 RX ADMIN — FENTANYL CITRATE 1.5 MCG/KG/HR: 50 INJECTION INTRAVENOUS at 12:39

## 2019-01-01 RX ADMIN — HEPARIN SODIUM 2630 UNITS: 1000 INJECTION INTRAVENOUS; SUBCUTANEOUS at 08:22

## 2019-01-01 RX ADMIN — Medication 10 ML: at 09:24

## 2019-01-01 RX ADMIN — Medication 0.2 MCG/KG/MIN: at 03:16

## 2019-01-01 RX ADMIN — Medication 1 CAPSULE: at 18:43

## 2019-01-01 RX ADMIN — CIPROFLOXACIN 400 MG: 2 INJECTION, SOLUTION INTRAVENOUS at 05:05

## 2019-01-01 RX ADMIN — FENTANYL CITRATE 0.5 MCG/KG/HR: 50 INJECTION INTRAVENOUS at 19:16

## 2019-01-01 RX ADMIN — MEROPENEM 1 G: 1 INJECTION, POWDER, FOR SOLUTION INTRAVENOUS at 05:42

## 2019-01-01 RX ADMIN — DESMOPRESSIN ACETATE 1 MCG: 4 SOLUTION INTRAVENOUS at 11:29

## 2019-01-01 RX ADMIN — PROPOFOL 20 MG: 10 INJECTION, EMULSION INTRAVENOUS at 17:13

## 2019-01-01 RX ADMIN — CIPROFLOXACIN 1 DROP: 3 SOLUTION OPHTHALMIC at 18:02

## 2019-01-01 RX ADMIN — DEXTROSE MONOHYDRATE 200 MG: 50 INJECTION, SOLUTION INTRAVENOUS at 19:33

## 2019-01-01 RX ADMIN — LEUCINE, PHENYLALANINE, LYSINE, METHIONINE, ISOLEUCINE, VALINE, HISTIDINE, THREONINE, TRYPTOPHAN, ALANINE, GLYCINE, ARGININE, PROLINE, SERINE, TYROSINE, SODIUM ACETATE, DIBASIC POTASSIUM PHOSPHATE, MAGNESIUM CHLORIDE, SODIUM CHLORIDE, CALCIUM CHLORIDE, DEXTROSE
365; 280; 290; 200; 300; 290; 240; 210; 90; 1035; 515; 575; 340; 250; 20; 340; 261; 51; 59; 33; 20 INJECTION INTRAVENOUS at 18:22

## 2019-01-01 RX ADMIN — DEXTROSE MONOHYDRATE 100 MG: 50 INJECTION, SOLUTION INTRAVENOUS at 18:31

## 2019-01-01 RX ADMIN — Medication 1 CAPSULE: at 18:11

## 2019-01-01 RX ADMIN — DEXMEDETOMIDINE 1.3 MCG/KG/HR: 100 INJECTION, SOLUTION, CONCENTRATE INTRAVENOUS at 10:05

## 2019-01-01 RX ADMIN — FAMOTIDINE 20 MG: 10 INJECTION, SOLUTION INTRAVENOUS at 08:40

## 2019-01-01 RX ADMIN — CIPROFLOXACIN 400 MG: 2 INJECTION, SOLUTION INTRAVENOUS at 16:56

## 2019-01-01 RX ADMIN — ONDANSETRON 4 MG: 2 INJECTION INTRAMUSCULAR; INTRAVENOUS at 09:51

## 2019-01-01 RX ADMIN — LORAZEPAM 0.5 MG: 2 INJECTION INTRAMUSCULAR; INTRAVENOUS at 14:02

## 2019-01-01 RX ADMIN — ENOXAPARIN SODIUM 70 MG: 80 INJECTION SUBCUTANEOUS at 08:52

## 2019-01-01 RX ADMIN — CIPROFLOXACIN 1 DROP: 3 SOLUTION OPHTHALMIC at 21:19

## 2019-01-01 RX ADMIN — METRONIDAZOLE 500 MG: 500 INJECTION, SOLUTION INTRAVENOUS at 06:35

## 2019-01-01 RX ADMIN — DEXTROSE MONOHYDRATE 100 MG: 50 INJECTION, SOLUTION INTRAVENOUS at 19:20

## 2019-01-01 RX ADMIN — Medication 10 ML: at 21:37

## 2019-01-01 RX ADMIN — ALTEPLASE 1 MG: 2.2 INJECTION, POWDER, LYOPHILIZED, FOR SOLUTION INTRAVENOUS at 00:54

## 2019-01-01 RX ADMIN — FAMOTIDINE 10 MG: 20 TABLET ORAL at 08:20

## 2019-01-01 RX ADMIN — PROPOFOL 45 MCG/KG/MIN: 10 INJECTION, EMULSION INTRAVENOUS at 23:37

## 2019-01-01 RX ADMIN — PROPOFOL 45 MCG/KG/MIN: 10 INJECTION, EMULSION INTRAVENOUS at 02:32

## 2019-01-01 RX ADMIN — CIPROFLOXACIN 1 DROP: 3 SOLUTION OPHTHALMIC at 05:47

## 2019-01-01 RX ADMIN — Medication 10 ML: at 09:04

## 2019-01-01 RX ADMIN — FAMOTIDINE 20 MG: 10 INJECTION, SOLUTION INTRAVENOUS at 10:01

## 2019-01-01 RX ADMIN — VANCOMYCIN HYDROCHLORIDE 750 MG: 750 INJECTION, POWDER, LYOPHILIZED, FOR SOLUTION INTRAVENOUS at 10:40

## 2019-01-01 RX ADMIN — Medication 1 CAPSULE: at 18:09

## 2019-01-01 RX ADMIN — METRONIDAZOLE 500 MG: 500 INJECTION, SOLUTION INTRAVENOUS at 18:19

## 2019-01-01 RX ADMIN — ENOXAPARIN SODIUM 70 MG: 80 INJECTION SUBCUTANEOUS at 20:39

## 2019-01-01 RX ADMIN — FAMOTIDINE 10 MG: 20 TABLET ORAL at 09:42

## 2019-01-01 RX ADMIN — CIPROFLOXACIN 400 MG: 2 INJECTION, SOLUTION INTRAVENOUS at 17:22

## 2019-01-01 RX ADMIN — FENTANYL CITRATE 1 MCG/KG/HR: 50 INJECTION INTRAVENOUS at 02:50

## 2019-01-01 RX ADMIN — CIPROFLOXACIN 1 DROP: 3 SOLUTION OPHTHALMIC at 05:55

## 2019-01-01 RX ADMIN — FAMOTIDINE 10 MG: 20 TABLET ORAL at 21:39

## 2019-01-01 RX ADMIN — CIPROFLOXACIN 1 DROP: 3 SOLUTION OPHTHALMIC at 23:35

## 2019-01-01 RX ADMIN — CIPROFLOXACIN 1 DROP: 3 SOLUTION OPHTHALMIC at 05:54

## 2019-01-01 RX ADMIN — HYDROMORPHONE HYDROCHLORIDE 0.5 MG: 1 INJECTION, SOLUTION INTRAMUSCULAR; INTRAVENOUS; SUBCUTANEOUS at 18:06

## 2019-01-01 RX ADMIN — METHYLPREDNISOLONE SODIUM SUCCINATE 40 MG: 40 INJECTION, POWDER, FOR SOLUTION INTRAMUSCULAR; INTRAVENOUS at 18:09

## 2019-01-01 RX ADMIN — METOPROLOL TARTRATE 100 MG: 50 TABLET, FILM COATED ORAL at 20:45

## 2019-01-01 RX ADMIN — CIPROFLOXACIN 1 DROP: 3 SOLUTION OPHTHALMIC at 00:12

## 2019-01-01 RX ADMIN — TOPIRAMATE 50 MG: 25 TABLET, FILM COATED ORAL at 21:00

## 2019-01-01 RX ADMIN — LORAZEPAM 0.5 MG: 0.5 TABLET ORAL at 15:27

## 2019-01-01 RX ADMIN — IOPAMIDOL 75 ML: 755 INJECTION, SOLUTION INTRAVENOUS at 17:24

## 2019-01-01 RX ADMIN — Medication 10 ML: at 08:24

## 2019-01-01 RX ADMIN — INSULIN LISPRO 1 UNITS: 100 INJECTION, SOLUTION INTRAVENOUS; SUBCUTANEOUS at 06:00

## 2019-01-01 RX ADMIN — APIXABAN 5 MG: 5 TABLET, FILM COATED ORAL at 20:45

## 2019-01-01 RX ADMIN — LORAZEPAM 0.5 MG: 2 INJECTION INTRAMUSCULAR; INTRAVENOUS at 14:38

## 2019-01-01 RX ADMIN — ONDANSETRON 4 MG: 2 INJECTION INTRAMUSCULAR; INTRAVENOUS at 15:35

## 2019-01-01 RX ADMIN — OXYCODONE HYDROCHLORIDE 5 MG: 5 TABLET ORAL at 14:39

## 2019-01-01 RX ADMIN — ENOXAPARIN SODIUM 70 MG: 80 INJECTION SUBCUTANEOUS at 21:09

## 2019-01-01 RX ADMIN — HEPARIN SODIUM 2630 UNITS: 1000 INJECTION INTRAVENOUS; SUBCUTANEOUS at 10:04

## 2019-01-01 RX ADMIN — OXYCODONE HYDROCHLORIDE 10 MG: 5 TABLET ORAL at 09:01

## 2019-01-01 RX ADMIN — Medication 1 CAPSULE: at 16:04

## 2019-01-01 RX ADMIN — FAMOTIDINE 20 MG: 10 INJECTION, SOLUTION INTRAVENOUS at 20:39

## 2019-01-01 RX ADMIN — PROPRANOLOL HYDROCHLORIDE 20 MG: 20 TABLET ORAL at 21:02

## 2019-01-01 RX ADMIN — PROPOFOL 30 MCG/KG/MIN: 10 INJECTION, EMULSION INTRAVENOUS at 07:03

## 2019-01-01 RX ADMIN — TOPIRAMATE 50 MG: 25 TABLET, FILM COATED ORAL at 09:42

## 2019-01-01 RX ADMIN — PROPOFOL 45 MCG/KG/MIN: 10 INJECTION, EMULSION INTRAVENOUS at 14:52

## 2019-01-01 RX ADMIN — DIGOXIN 125 MCG: 0.25 INJECTION INTRAMUSCULAR; INTRAVENOUS at 10:26

## 2019-01-01 RX ADMIN — FENTANYL CITRATE 50 MCG: 50 INJECTION, SOLUTION INTRAMUSCULAR; INTRAVENOUS at 15:20

## 2019-01-01 RX ADMIN — TOPIRAMATE 50 MG: 25 TABLET, FILM COATED ORAL at 20:51

## 2019-01-01 RX ADMIN — FENTANYL CITRATE 1 MCG/KG/HR: 50 INJECTION INTRAVENOUS at 02:49

## 2019-01-01 RX ADMIN — METRONIDAZOLE 500 MG: 500 INJECTION, SOLUTION INTRAVENOUS at 21:18

## 2019-01-01 RX ADMIN — Medication 10 ML: at 08:21

## 2019-01-01 RX ADMIN — METOPROLOL TARTRATE 50 MG: 50 TABLET, FILM COATED ORAL at 09:42

## 2019-01-01 RX ADMIN — FENTANYL CITRATE 1 MCG/KG/HR: 50 INJECTION INTRAVENOUS at 08:40

## 2019-01-01 RX ADMIN — LORAZEPAM 0.5 MG: 2 INJECTION INTRAMUSCULAR; INTRAVENOUS at 19:32

## 2019-01-01 RX ADMIN — SODIUM CHLORIDE: 4.5 INJECTION, SOLUTION INTRAVENOUS at 17:56

## 2019-01-01 RX ADMIN — TOPIRAMATE 50 MG: 25 TABLET, FILM COATED ORAL at 20:07

## 2019-01-01 RX ADMIN — INSULIN LISPRO 1 UNITS: 100 INJECTION, SOLUTION INTRAVENOUS; SUBCUTANEOUS at 09:20

## 2019-01-01 RX ADMIN — TOPIRAMATE 50 MG: 25 TABLET, FILM COATED ORAL at 20:09

## 2019-01-01 RX ADMIN — TIOTROPIUM BROMIDE INHALATION SPRAY 2 PUFF: 3.12 SPRAY, METERED RESPIRATORY (INHALATION) at 08:33

## 2019-01-01 RX ADMIN — Medication 10 ML: at 20:46

## 2019-01-01 RX ADMIN — MORPHINE SULFATE 4 MG: 4 INJECTION, SOLUTION INTRAMUSCULAR; INTRAVENOUS at 17:03

## 2019-01-01 RX ADMIN — PROPOFOL 50 MCG/KG/MIN: 10 INJECTION, EMULSION INTRAVENOUS at 05:05

## 2019-01-01 RX ADMIN — METRONIDAZOLE 500 MG: 500 INJECTION, SOLUTION INTRAVENOUS at 09:49

## 2019-01-01 RX ADMIN — TOPIRAMATE 50 MG: 25 TABLET, FILM COATED ORAL at 20:39

## 2019-01-01 RX ADMIN — DEXMEDETOMIDINE 0.4 MCG/KG/HR: 100 INJECTION, SOLUTION, CONCENTRATE INTRAVENOUS at 21:00

## 2019-01-01 RX ADMIN — VANCOMYCIN HYDROCHLORIDE 750 MG: 750 INJECTION, POWDER, LYOPHILIZED, FOR SOLUTION INTRAVENOUS at 21:02

## 2019-01-01 RX ADMIN — SODIUM CHLORIDE: 9 INJECTION, SOLUTION INTRAVENOUS at 18:55

## 2019-01-01 RX ADMIN — Medication 1 CAPSULE: at 17:46

## 2019-01-01 RX ADMIN — MEROPENEM 1 G: 1 INJECTION, POWDER, FOR SOLUTION INTRAVENOUS at 13:11

## 2019-01-01 RX ADMIN — ONDANSETRON 4 MG: 2 INJECTION INTRAMUSCULAR; INTRAVENOUS at 23:03

## 2019-01-01 RX ADMIN — CIPROFLOXACIN 1 DROP: 3 SOLUTION OPHTHALMIC at 17:50

## 2019-01-01 RX ADMIN — METHYLPREDNISOLONE SODIUM SUCCINATE 40 MG: 40 INJECTION, POWDER, FOR SOLUTION INTRAMUSCULAR; INTRAVENOUS at 12:12

## 2019-01-01 RX ADMIN — OXYCODONE HYDROCHLORIDE 5 MG: 5 TABLET ORAL at 08:53

## 2019-01-01 RX ADMIN — HEPARIN SODIUM 18 UNITS/KG/HR: 10000 INJECTION, SOLUTION INTRAVENOUS at 18:08

## 2019-01-01 RX ADMIN — DEXMEDETOMIDINE 1 MCG/KG/HR: 100 INJECTION, SOLUTION, CONCENTRATE INTRAVENOUS at 19:00

## 2019-01-01 RX ADMIN — FAMOTIDINE 20 MG: 10 INJECTION, SOLUTION INTRAVENOUS at 08:17

## 2019-01-01 RX ADMIN — METHYLPREDNISOLONE SODIUM SUCCINATE 40 MG: 40 INJECTION, POWDER, FOR SOLUTION INTRAMUSCULAR; INTRAVENOUS at 11:59

## 2019-01-01 RX ADMIN — FENTANYL CITRATE 1 MCG/KG/HR: 50 INJECTION INTRAVENOUS at 19:35

## 2019-01-01 RX ADMIN — METRONIDAZOLE 500 MG: 500 INJECTION, SOLUTION INTRAVENOUS at 22:10

## 2019-01-01 RX ADMIN — SODIUM CHLORIDE 250 ML: 9 INJECTION, SOLUTION INTRAVENOUS at 21:41

## 2019-01-01 RX ADMIN — PROPOFOL 50 MCG/KG/MIN: 10 INJECTION, EMULSION INTRAVENOUS at 22:14

## 2019-01-01 RX ADMIN — HEPARIN SODIUM 12 UNITS/KG/HR: 10000 INJECTION, SOLUTION INTRAVENOUS at 10:06

## 2019-01-01 RX ADMIN — DEXMEDETOMIDINE 1.2 MCG/KG/HR: 100 INJECTION, SOLUTION, CONCENTRATE INTRAVENOUS at 01:29

## 2019-01-01 RX ADMIN — Medication 10 ML: at 09:23

## 2019-01-01 RX ADMIN — METOPROLOL TARTRATE 50 MG: 50 TABLET, FILM COATED ORAL at 11:54

## 2019-01-01 RX ADMIN — Medication 10 ML: at 21:51

## 2019-01-01 RX ADMIN — SODIUM CHLORIDE: 9 INJECTION, SOLUTION INTRAVENOUS at 10:23

## 2019-01-01 RX ADMIN — Medication 1 CAPSULE: at 08:21

## 2019-01-01 RX ADMIN — INSULIN LISPRO 1 UNITS: 100 INJECTION, SOLUTION INTRAVENOUS; SUBCUTANEOUS at 02:00

## 2019-01-01 RX ADMIN — MORPHINE SULFATE 5 MG: 4 INJECTION INTRAVENOUS at 08:21

## 2019-01-01 RX ADMIN — SODIUM CHLORIDE 250 ML: 9 INJECTION, SOLUTION INTRAVENOUS at 04:01

## 2019-01-01 RX ADMIN — FENTANYL CITRATE 1.5 MCG/KG/HR: 50 INJECTION INTRAVENOUS at 15:31

## 2019-01-01 RX ADMIN — OXYCODONE HYDROCHLORIDE 10 MG: 5 TABLET ORAL at 14:17

## 2019-01-01 RX ADMIN — DIGOXIN 125 MCG: 0.25 INJECTION INTRAMUSCULAR; INTRAVENOUS at 08:16

## 2019-01-01 RX ADMIN — METRONIDAZOLE 500 MG: 500 INJECTION, SOLUTION INTRAVENOUS at 01:57

## 2019-01-01 RX ADMIN — Medication 1 CAPSULE: at 08:46

## 2019-01-01 RX ADMIN — METOPROLOL TARTRATE 100 MG: 50 TABLET, FILM COATED ORAL at 21:38

## 2019-01-01 RX ADMIN — Medication 10 ML: at 09:38

## 2019-01-01 RX ADMIN — Medication 10 ML: at 00:55

## 2019-01-01 RX ADMIN — FAMOTIDINE 20 MG: 10 INJECTION, SOLUTION INTRAVENOUS at 08:45

## 2019-01-01 RX ADMIN — Medication 10 ML: at 13:03

## 2019-01-01 RX ADMIN — OXYCODONE HYDROCHLORIDE 5 MG: 5 TABLET ORAL at 08:51

## 2019-01-01 RX ADMIN — CIPROFLOXACIN 400 MG: 2 INJECTION, SOLUTION INTRAVENOUS at 17:45

## 2019-01-01 RX ADMIN — TOPIRAMATE 50 MG: 25 TABLET, FILM COATED ORAL at 20:29

## 2019-01-01 RX ADMIN — HEPARIN SODIUM 16 UNITS/KG/HR: 10000 INJECTION, SOLUTION INTRAVENOUS at 16:34

## 2019-01-01 RX ADMIN — LEUCINE, PHENYLALANINE, LYSINE, METHIONINE, ISOLEUCINE, VALINE, HISTIDINE, THREONINE, TRYPTOPHAN, ALANINE, GLYCINE, ARGININE, PROLINE, SERINE, TYROSINE, SODIUM ACETATE, DIBASIC POTASSIUM PHOSPHATE, MAGNESIUM CHLORIDE, SODIUM CHLORIDE, CALCIUM CHLORIDE, DEXTROSE
365; 280; 290; 200; 300; 290; 240; 210; 90; 1035; 515; 575; 340; 250; 20; 340; 261; 51; 59; 33; 20 INJECTION INTRAVENOUS at 18:30

## 2019-01-01 RX ADMIN — ENOXAPARIN SODIUM 70 MG: 80 INJECTION SUBCUTANEOUS at 20:29

## 2019-01-01 RX ADMIN — VANCOMYCIN HYDROCHLORIDE 750 MG: 750 INJECTION, POWDER, LYOPHILIZED, FOR SOLUTION INTRAVENOUS at 13:33

## 2019-01-01 RX ADMIN — METHYLPREDNISOLONE SODIUM SUCCINATE 40 MG: 40 INJECTION, POWDER, FOR SOLUTION INTRAMUSCULAR; INTRAVENOUS at 13:45

## 2019-01-01 RX ADMIN — FENTANYL CITRATE 50 MCG: 50 INJECTION, SOLUTION INTRAMUSCULAR; INTRAVENOUS at 15:05

## 2019-01-01 RX ADMIN — SODIUM CHLORIDE: 9 INJECTION, SOLUTION INTRAVENOUS at 22:27

## 2019-01-01 RX ADMIN — CIPROFLOXACIN 1 DROP: 3 SOLUTION OPHTHALMIC at 06:42

## 2019-01-01 RX ADMIN — DEXMEDETOMIDINE 0.4 MCG/KG/HR: 100 INJECTION, SOLUTION, CONCENTRATE INTRAVENOUS at 08:55

## 2019-01-01 RX ADMIN — METRONIDAZOLE 500 MG: 500 INJECTION, SOLUTION INTRAVENOUS at 21:30

## 2019-01-01 RX ADMIN — HEPARIN SODIUM 18 UNITS/KG/HR: 10000 INJECTION, SOLUTION INTRAVENOUS at 18:03

## 2019-01-01 RX ADMIN — TOPIRAMATE 50 MG: 25 TABLET, FILM COATED ORAL at 20:40

## 2019-01-01 RX ADMIN — Medication 10 ML: at 08:40

## 2019-01-01 RX ADMIN — TOPIRAMATE 50 MG: 25 TABLET, FILM COATED ORAL at 22:58

## 2019-01-01 RX ADMIN — FENTANYL CITRATE 1 MCG/KG/HR: 50 INJECTION INTRAVENOUS at 23:41

## 2019-01-01 RX ADMIN — Medication 1 CAPSULE: at 08:13

## 2019-01-01 RX ADMIN — Medication 0.04 MCG/KG/MIN: at 23:10

## 2019-01-01 RX ADMIN — TOPIRAMATE 50 MG: 25 TABLET, FILM COATED ORAL at 08:25

## 2019-01-01 RX ADMIN — FAMOTIDINE 20 MG: 10 INJECTION, SOLUTION INTRAVENOUS at 08:41

## 2019-01-01 RX ADMIN — Medication 10 ML: at 10:22

## 2019-01-01 RX ADMIN — Medication 10 ML: at 08:35

## 2019-01-01 RX ADMIN — SODIUM CHLORIDE: 9 INJECTION, SOLUTION INTRAVENOUS at 18:25

## 2019-01-01 RX ADMIN — SENNOSIDES AND DOCUSATE SODIUM 1 TABLET: 8.6; 5 TABLET ORAL at 09:40

## 2019-01-01 RX ADMIN — FAMOTIDINE 10 MG: 20 TABLET ORAL at 09:48

## 2019-01-01 RX ADMIN — Medication 10 ML: at 08:49

## 2019-01-01 RX ADMIN — FAMOTIDINE 10 MG: 20 TABLET ORAL at 08:18

## 2019-01-01 RX ADMIN — HEPARIN SODIUM AND DEXTROSE 18 UNITS/KG/HR: 10000; 5 INJECTION INTRAVENOUS at 22:59

## 2019-01-01 RX ADMIN — CIPROFLOXACIN 400 MG: 2 INJECTION, SOLUTION INTRAVENOUS at 06:06

## 2019-01-01 RX ADMIN — FAMOTIDINE 20 MG: 20 TABLET ORAL at 10:22

## 2019-01-01 RX ADMIN — LORAZEPAM 0.5 MG: 0.5 TABLET ORAL at 00:28

## 2019-01-01 RX ADMIN — Medication 10 ML: at 08:08

## 2019-01-01 RX ADMIN — SODIUM CHLORIDE: 4.5 INJECTION, SOLUTION INTRAVENOUS at 02:22

## 2019-01-01 RX ADMIN — DEXTROSE MONOHYDRATE: 50 INJECTION, SOLUTION INTRAVENOUS at 16:36

## 2019-01-01 RX ADMIN — LORAZEPAM 1 MG: 2 INJECTION INTRAMUSCULAR; INTRAVENOUS at 01:35

## 2019-01-01 RX ADMIN — METHYLPREDNISOLONE SODIUM SUCCINATE 40 MG: 40 INJECTION, POWDER, FOR SOLUTION INTRAMUSCULAR; INTRAVENOUS at 00:02

## 2019-01-01 RX ADMIN — FAMOTIDINE 20 MG: 10 INJECTION, SOLUTION INTRAVENOUS at 20:29

## 2019-01-01 RX ADMIN — LORAZEPAM 0.5 MG: 2 INJECTION INTRAMUSCULAR; INTRAVENOUS at 02:35

## 2019-01-01 RX ADMIN — TOPIRAMATE 50 MG: 25 TABLET, FILM COATED ORAL at 21:14

## 2019-01-01 RX ADMIN — TOPIRAMATE 50 MG: 25 TABLET, FILM COATED ORAL at 21:38

## 2019-01-01 RX ADMIN — PROPOFOL 50 MCG/KG/MIN: 10 INJECTION, EMULSION INTRAVENOUS at 03:59

## 2019-01-01 RX ADMIN — OXYCODONE HYDROCHLORIDE 10 MG: 5 TABLET ORAL at 17:37

## 2019-01-01 RX ADMIN — OXYCODONE HYDROCHLORIDE 10 MG: 5 TABLET ORAL at 15:27

## 2019-01-01 RX ADMIN — ENOXAPARIN SODIUM 70 MG: 80 INJECTION SUBCUTANEOUS at 20:24

## 2019-01-01 RX ADMIN — DIGOXIN 125 MCG: 0.25 INJECTION INTRAMUSCULAR; INTRAVENOUS at 10:00

## 2019-01-01 RX ADMIN — Medication 10 ML: at 21:36

## 2019-01-01 RX ADMIN — METRONIDAZOLE 500 MG: 500 INJECTION, SOLUTION INTRAVENOUS at 21:03

## 2019-01-01 RX ADMIN — TOPIRAMATE 50 MG: 25 TABLET, FILM COATED ORAL at 08:18

## 2019-01-01 RX ADMIN — PROPOFOL 30 MCG/KG/MIN: 10 INJECTION, EMULSION INTRAVENOUS at 10:27

## 2019-01-01 RX ADMIN — MAGNESIUM SULFATE HEPTAHYDRATE 2 G: 40 INJECTION, SOLUTION INTRAVENOUS at 06:40

## 2019-01-01 RX ADMIN — CIPROFLOXACIN 400 MG: 2 INJECTION, SOLUTION INTRAVENOUS at 16:36

## 2019-01-01 RX ADMIN — METRONIDAZOLE 500 MG: 500 INJECTION, SOLUTION INTRAVENOUS at 20:18

## 2019-01-01 RX ADMIN — CIPROFLOXACIN 1 DROP: 3 SOLUTION OPHTHALMIC at 00:25

## 2019-01-01 RX ADMIN — Medication 10 ML: at 20:43

## 2019-01-01 RX ADMIN — OXYCODONE HYDROCHLORIDE 5 MG: 5 TABLET ORAL at 20:09

## 2019-01-01 RX ADMIN — LORAZEPAM 0.5 MG: 0.5 TABLET ORAL at 05:43

## 2019-01-01 RX ADMIN — LORAZEPAM 0.5 MG: 0.5 TABLET ORAL at 22:41

## 2019-01-01 RX ADMIN — FAMOTIDINE 20 MG: 10 INJECTION, SOLUTION INTRAVENOUS at 21:18

## 2019-01-01 RX ADMIN — SODIUM BICARBONATE 100 MEQ: 84 INJECTION INTRAVENOUS at 10:22

## 2019-01-01 RX ADMIN — ACETAMINOPHEN 650 MG: 325 TABLET, FILM COATED ORAL at 10:00

## 2019-01-01 RX ADMIN — Medication 10 ML: at 22:03

## 2019-01-01 RX ADMIN — CIPROFLOXACIN 1 DROP: 3 SOLUTION OPHTHALMIC at 12:12

## 2019-01-01 RX ADMIN — CIPROFLOXACIN 400 MG: 2 INJECTION, SOLUTION INTRAVENOUS at 19:28

## 2019-01-01 RX ADMIN — TOPIRAMATE 50 MG: 25 TABLET, FILM COATED ORAL at 08:53

## 2019-01-01 RX ADMIN — TOPIRAMATE 50 MG: 25 TABLET, FILM COATED ORAL at 08:21

## 2019-01-01 RX ADMIN — CIPROFLOXACIN 400 MG: 2 INJECTION, SOLUTION INTRAVENOUS at 04:24

## 2019-01-01 RX ADMIN — METRONIDAZOLE 500 MG: 500 INJECTION, SOLUTION INTRAVENOUS at 14:25

## 2019-01-01 RX ADMIN — DEXMEDETOMIDINE 0.6 MCG/KG/HR: 100 INJECTION, SOLUTION, CONCENTRATE INTRAVENOUS at 05:13

## 2019-01-01 RX ADMIN — PROPRANOLOL HYDROCHLORIDE 10 MG: 20 TABLET ORAL at 14:25

## 2019-01-01 RX ADMIN — LORAZEPAM 0.5 MG: 2 INJECTION INTRAMUSCULAR; INTRAVENOUS at 06:02

## 2019-01-01 RX ADMIN — TIOTROPIUM BROMIDE INHALATION SPRAY 2 PUFF: 3.12 SPRAY, METERED RESPIRATORY (INHALATION) at 08:45

## 2019-01-01 RX ADMIN — PROPOFOL 50 MCG/KG/MIN: 10 INJECTION, EMULSION INTRAVENOUS at 16:25

## 2019-01-01 RX ADMIN — Medication 10 ML: at 08:22

## 2019-01-01 RX ADMIN — METRONIDAZOLE 500 MG: 500 INJECTION, SOLUTION INTRAVENOUS at 18:25

## 2019-01-01 RX ADMIN — PROPOFOL 60 MCG/KG/MIN: 10 INJECTION, EMULSION INTRAVENOUS at 09:23

## 2019-01-01 RX ADMIN — SODIUM CHLORIDE: 9 INJECTION, SOLUTION INTRAVENOUS at 15:09

## 2019-01-01 RX ADMIN — DESMOPRESSIN ACETATE 40 MG: 0.2 TABLET ORAL at 21:39

## 2019-01-01 RX ADMIN — METOPROLOL TARTRATE 50 MG: 50 TABLET, FILM COATED ORAL at 20:40

## 2019-01-01 RX ADMIN — INSULIN LISPRO 1 UNITS: 100 INJECTION, SOLUTION INTRAVENOUS; SUBCUTANEOUS at 21:18

## 2019-01-01 RX ADMIN — PROPOFOL 50 MG: 10 INJECTION, EMULSION INTRAVENOUS at 17:16

## 2019-01-01 RX ADMIN — OXYCODONE HYDROCHLORIDE 5 MG: 5 TABLET ORAL at 21:51

## 2019-01-01 RX ADMIN — TOPIRAMATE 50 MG: 25 TABLET, FILM COATED ORAL at 20:30

## 2019-01-01 RX ADMIN — FAMOTIDINE 10 MG: 20 TABLET ORAL at 20:45

## 2019-01-01 RX ADMIN — PROPOFOL 50 MCG/KG/MIN: 10 INJECTION, EMULSION INTRAVENOUS at 09:45

## 2019-01-01 RX ADMIN — PROPOFOL 30 MCG/KG/MIN: 10 INJECTION, EMULSION INTRAVENOUS at 20:55

## 2019-01-01 RX ADMIN — HEPARIN SODIUM 5260 UNITS: 1000 INJECTION, SOLUTION INTRAVENOUS; SUBCUTANEOUS at 21:17

## 2019-01-01 RX ADMIN — METHYLPREDNISOLONE SODIUM SUCCINATE 40 MG: 40 INJECTION, POWDER, FOR SOLUTION INTRAMUSCULAR; INTRAVENOUS at 00:09

## 2019-01-01 RX ADMIN — PROPOFOL 30 MCG/KG/MIN: 10 INJECTION, EMULSION INTRAVENOUS at 15:28

## 2019-01-01 RX ADMIN — NALOXONE HYDROCHLORIDE 1 MG: 1 INJECTION PARENTERAL at 18:16

## 2019-01-01 RX ADMIN — LORAZEPAM 0.5 MG: 0.5 TABLET ORAL at 21:39

## 2019-01-01 RX ADMIN — FENTANYL CITRATE 1.5 MCG/KG/HR: 50 INJECTION INTRAVENOUS at 01:29

## 2019-01-01 RX ADMIN — PROPOFOL 30 MCG/KG/MIN: 10 INJECTION, EMULSION INTRAVENOUS at 01:54

## 2019-01-01 RX ADMIN — METHYLPREDNISOLONE SODIUM SUCCINATE 40 MG: 40 INJECTION, POWDER, FOR SOLUTION INTRAMUSCULAR; INTRAVENOUS at 18:12

## 2019-01-01 RX ADMIN — DIGOXIN 125 MCG: 0.25 INJECTION INTRAMUSCULAR; INTRAVENOUS at 08:21

## 2019-01-01 RX ADMIN — METHYLPREDNISOLONE SODIUM SUCCINATE 40 MG: 40 INJECTION, POWDER, FOR SOLUTION INTRAMUSCULAR; INTRAVENOUS at 17:46

## 2019-01-01 RX ADMIN — Medication 1 CAPSULE: at 08:51

## 2019-01-01 RX ADMIN — METHYLPREDNISOLONE SODIUM SUCCINATE 40 MG: 40 INJECTION, POWDER, FOR SOLUTION INTRAMUSCULAR; INTRAVENOUS at 12:07

## 2019-01-01 RX ADMIN — FAMOTIDINE 10 MG: 20 TABLET ORAL at 20:38

## 2019-01-01 RX ADMIN — Medication 10 ML: at 21:01

## 2019-01-01 RX ADMIN — SODIUM CHLORIDE: 4.5 INJECTION, SOLUTION INTRAVENOUS at 17:30

## 2019-01-01 RX ADMIN — PROPRANOLOL HYDROCHLORIDE 10 MG: 20 TABLET ORAL at 21:30

## 2019-01-01 RX ADMIN — ENOXAPARIN SODIUM 70 MG: 80 INJECTION SUBCUTANEOUS at 09:20

## 2019-01-01 RX ADMIN — METOPROLOL TARTRATE 100 MG: 50 TABLET, FILM COATED ORAL at 22:59

## 2019-01-01 RX ADMIN — OXYCODONE HYDROCHLORIDE 5 MG: 5 TABLET ORAL at 12:59

## 2019-01-01 RX ADMIN — VANCOMYCIN HYDROCHLORIDE 750 MG: 750 INJECTION, POWDER, LYOPHILIZED, FOR SOLUTION INTRAVENOUS at 10:27

## 2019-01-01 RX ADMIN — PROPRANOLOL HYDROCHLORIDE: 20 TABLET ORAL at 15:21

## 2019-01-01 RX ADMIN — LORAZEPAM 0.5 MG: 0.5 TABLET ORAL at 09:49

## 2019-01-01 RX ADMIN — ACETAMINOPHEN 650 MG: 325 TABLET, FILM COATED ORAL at 04:26

## 2019-01-01 RX ADMIN — FENTANYL CITRATE 1.5 MCG/KG/HR: 50 INJECTION INTRAVENOUS at 23:17

## 2019-01-01 RX ADMIN — ENOXAPARIN SODIUM 70 MG: 80 INJECTION SUBCUTANEOUS at 08:47

## 2019-01-01 RX ADMIN — DEXMEDETOMIDINE 0.2 MCG/KG/HR: 100 INJECTION, SOLUTION, CONCENTRATE INTRAVENOUS at 11:03

## 2019-01-01 RX ADMIN — Medication 10 ML: at 10:21

## 2019-01-01 RX ADMIN — HYDROCODONE BITARTRATE AND ACETAMINOPHEN 1 TABLET: 5; 325 TABLET ORAL at 00:29

## 2019-01-01 RX ADMIN — METOPROLOL TARTRATE 50 MG: 50 TABLET, FILM COATED ORAL at 08:53

## 2019-01-01 RX ADMIN — LEVOFLOXACIN 750 MG: 5 INJECTION, SOLUTION INTRAVENOUS at 19:15

## 2019-01-01 RX ADMIN — LORAZEPAM 0.5 MG: 2 INJECTION INTRAMUSCULAR; INTRAVENOUS at 22:10

## 2019-01-01 RX ADMIN — LORAZEPAM 0.5 MG: 2 INJECTION INTRAMUSCULAR; INTRAVENOUS at 18:02

## 2019-01-01 RX ADMIN — FAMOTIDINE 20 MG: 10 INJECTION, SOLUTION INTRAVENOUS at 20:43

## 2019-01-01 RX ADMIN — DIGOXIN 0.12 MG: 125 TABLET ORAL at 08:18

## 2019-01-01 RX ADMIN — DEXMEDETOMIDINE 0.7 MCG/KG/HR: 100 INJECTION, SOLUTION, CONCENTRATE INTRAVENOUS at 04:02

## 2019-01-01 RX ADMIN — LORAZEPAM 0.5 MG: 0.5 TABLET ORAL at 08:18

## 2019-01-01 RX ADMIN — LIDOCAINE HYDROCHLORIDE 5 ML: 10 INJECTION, SOLUTION EPIDURAL; INFILTRATION; INTRACAUDAL; PERINEURAL at 12:54

## 2019-01-01 RX ADMIN — METOPROLOL TARTRATE 50 MG: 50 TABLET, FILM COATED ORAL at 09:18

## 2019-01-01 RX ADMIN — OXYCODONE HYDROCHLORIDE 10 MG: 5 TABLET ORAL at 20:39

## 2019-01-01 RX ADMIN — Medication 10 ML: at 21:14

## 2019-01-01 RX ADMIN — FENTANYL CITRATE 1 MCG/KG/HR: 50 INJECTION INTRAVENOUS at 18:03

## 2019-01-01 RX ADMIN — PROPOFOL 50 MG: 10 INJECTION, EMULSION INTRAVENOUS at 17:06

## 2019-01-01 ASSESSMENT — PULMONARY FUNCTION TESTS
PIF_VALUE: 16
PIF_VALUE: 15
PIF_VALUE: 35
PIF_VALUE: 15
PIF_VALUE: 21
PIF_VALUE: 15
PIF_VALUE: 16
PIF_VALUE: 16
PIF_VALUE: 22
PIF_VALUE: 21
PIF_VALUE: 10
PIF_VALUE: 16
PIF_VALUE: 22
PIF_VALUE: 16
PIF_VALUE: 16
PIF_VALUE: 15
PIF_VALUE: 13
PIF_VALUE: 25
PIF_VALUE: 16
PIF_VALUE: 7.1
PIF_VALUE: 17
PIF_VALUE: 23
PIF_VALUE: 15
PIF_VALUE: 13
PIF_VALUE: 16
PIF_VALUE: 21
PIF_VALUE: 16
PIF_VALUE: 16
PIF_VALUE: 15
PIF_VALUE: 0
PIF_VALUE: 20
PIF_VALUE: 16
PIF_VALUE: 15
PIF_VALUE: 23
PIF_VALUE: 15
PIF_VALUE: 16
PIF_VALUE: 16
PIF_VALUE: 13
PIF_VALUE: 26
PIF_VALUE: 25
PIF_VALUE: 16
PIF_VALUE: 11
PIF_VALUE: 15
PIF_VALUE: 21
PIF_VALUE: 19
PIF_VALUE: 15
PIF_VALUE: 16
PIF_VALUE: 15
PIF_VALUE: 16
PIF_VALUE: 11
PIF_VALUE: 15
PIF_VALUE: 14
PIF_VALUE: 20
PIF_VALUE: 16
PIF_VALUE: 15
PIF_VALUE: 21
PIF_VALUE: 26
PIF_VALUE: 14
PIF_VALUE: 15
PIF_VALUE: 26
PIF_VALUE: 12
PIF_VALUE: 15
PIF_VALUE: 16
PIF_VALUE: 15
PIF_VALUE: 16
PIF_VALUE: 15
PIF_VALUE: 16
PIF_VALUE: 17
PIF_VALUE: 20
PIF_VALUE: 16
PIF_VALUE: 15
PIF_VALUE: 16
PIF_VALUE: 26
PIF_VALUE: 36
PIF_VALUE: 15
PIF_VALUE: 16
PIF_VALUE: 16
PIF_VALUE: 25
PIF_VALUE: 15
PIF_VALUE: 15
PIF_VALUE: 19
PIF_VALUE: 14
PIF_VALUE: 16
PIF_VALUE: 16
PIF_VALUE: 15
PIF_VALUE: 15
PIF_VALUE: 16
PIF_VALUE: 15
PIF_VALUE: 18
PIF_VALUE: 16
PIF_VALUE: 23
PIF_VALUE: 21
PIF_VALUE: 16
PIF_VALUE: 17
PIF_VALUE: 16
PIF_VALUE: 15
PIF_VALUE: 16
PIF_VALUE: 15
PIF_VALUE: 16
PIF_VALUE: 16
PIF_VALUE: 25
PIF_VALUE: 24
PIF_VALUE: 16
PIF_VALUE: 21
PIF_VALUE: 15
PIF_VALUE: 15
PIF_VALUE: 16
PIF_VALUE: 16
PIF_VALUE: 22
PIF_VALUE: 15
PIF_VALUE: 16
PIF_VALUE: 16
PIF_VALUE: 14
PIF_VALUE: 15
PIF_VALUE: 15
PIF_VALUE: 16
PIF_VALUE: 16
PIF_VALUE: 25
PIF_VALUE: 15
PIF_VALUE: 16
PIF_VALUE: 16
PIF_VALUE: 15
PIF_VALUE: 15
PIF_VALUE: 21
PIF_VALUE: 15
PIF_VALUE: 15
PIF_VALUE: 16
PIF_VALUE: 21
PIF_VALUE: 11
PIF_VALUE: 15
PIF_VALUE: 17
PIF_VALUE: 16
PIF_VALUE: 19
PIF_VALUE: 25
PIF_VALUE: 16
PIF_VALUE: 15
PIF_VALUE: 16
PIF_VALUE: 15
PIF_VALUE: 14
PIF_VALUE: 12
PIF_VALUE: 21
PIF_VALUE: 15
PIF_VALUE: 11
PIF_VALUE: 18
PIF_VALUE: 11
PIF_VALUE: 12
PIF_VALUE: 16
PIF_VALUE: 5
PIF_VALUE: 17
PIF_VALUE: 16
PIF_VALUE: 16
PIF_VALUE: 15
PIF_VALUE: 17
PIF_VALUE: 21
PIF_VALUE: 16
PIF_VALUE: 15
PIF_VALUE: 18
PIF_VALUE: 21
PIF_VALUE: 24
PIF_VALUE: 30
PIF_VALUE: 17
PIF_VALUE: 14
PIF_VALUE: 15
PIF_VALUE: 25
PIF_VALUE: 13
PIF_VALUE: 16
PIF_VALUE: 16
PIF_VALUE: 15
PIF_VALUE: 15
PIF_VALUE: 11
PIF_VALUE: 15
PIF_VALUE: 16
PIF_VALUE: 15
PIF_VALUE: 24
PIF_VALUE: 16
PIF_VALUE: 15
PIF_VALUE: 17
PIF_VALUE: 16
PIF_VALUE: 21
PIF_VALUE: 12
PIF_VALUE: 16
PIF_VALUE: 39
PIF_VALUE: 16
PIF_VALUE: 22
PIF_VALUE: 16
PIF_VALUE: 16
PIF_VALUE: 30
PIF_VALUE: 15
PIF_VALUE: 20
PIF_VALUE: 20
PIF_VALUE: 22
PIF_VALUE: 16
PIF_VALUE: 21
PIF_VALUE: 11
PIF_VALUE: 15
PIF_VALUE: 20
PIF_VALUE: 15
PIF_VALUE: 16
PIF_VALUE: 16
PIF_VALUE: 11
PIF_VALUE: 20
PIF_VALUE: 16
PIF_VALUE: 26
PIF_VALUE: 10
PIF_VALUE: 16
PIF_VALUE: 12
PIF_VALUE: 12
PIF_VALUE: 22
PIF_VALUE: 11
PIF_VALUE: 15
PIF_VALUE: 22
PIF_VALUE: 19
PIF_VALUE: 15
PIF_VALUE: 20
PIF_VALUE: 14
PIF_VALUE: 16
PIF_VALUE: 15
PIF_VALUE: 15
PIF_VALUE: 11
PIF_VALUE: 29
PIF_VALUE: 15
PIF_VALUE: 22
PIF_VALUE: 15
PIF_VALUE: 16
PIF_VALUE: 16
PIF_VALUE: 14
PIF_VALUE: 16
PIF_VALUE: 16
PIF_VALUE: 12
PIF_VALUE: 15
PIF_VALUE: 16
PIF_VALUE: 19
PIF_VALUE: 11
PIF_VALUE: 20
PIF_VALUE: 16
PIF_VALUE: 10
PIF_VALUE: 24
PIF_VALUE: 21
PIF_VALUE: 14
PIF_VALUE: 14
PIF_VALUE: 16
PIF_VALUE: 13
PIF_VALUE: 16
PIF_VALUE: 15
PIF_VALUE: 15
PIF_VALUE: 21
PIF_VALUE: 27
PIF_VALUE: 14
PIF_VALUE: 16
PIF_VALUE: 15
PIF_VALUE: 16
PIF_VALUE: 15
PIF_VALUE: 23
PIF_VALUE: 16
PIF_VALUE: 16
PIF_VALUE: 15
PIF_VALUE: 13
PIF_VALUE: 15
PIF_VALUE: 15
PIF_VALUE: 5
PIF_VALUE: 16
PIF_VALUE: 22
PIF_VALUE: 23
PIF_VALUE: 11
PIF_VALUE: 12
PIF_VALUE: 11
PIF_VALUE: 0
PIF_VALUE: 14
PIF_VALUE: 18
PIF_VALUE: 16
PIF_VALUE: 15
PIF_VALUE: 28
PIF_VALUE: 19
PIF_VALUE: 12
PIF_VALUE: 16
PIF_VALUE: 16
PIF_VALUE: 14
PIF_VALUE: 11
PIF_VALUE: 16
PIF_VALUE: 16
PIF_VALUE: 19
PIF_VALUE: 11
PIF_VALUE: 13
PIF_VALUE: 16
PIF_VALUE: 15
PIF_VALUE: 20
PIF_VALUE: 21
PIF_VALUE: 16
PIF_VALUE: 15
PIF_VALUE: 21
PIF_VALUE: 11
PIF_VALUE: 15
PIF_VALUE: 15
PIF_VALUE: 12
PIF_VALUE: 14
PIF_VALUE: 19
PIF_VALUE: 16
PIF_VALUE: 15
PIF_VALUE: 16
PIF_VALUE: 20
PIF_VALUE: 11
PIF_VALUE: 19
PIF_VALUE: 19
PIF_VALUE: 15
PIF_VALUE: 15
PIF_VALUE: 14
PIF_VALUE: 7.1
PIF_VALUE: 37
PIF_VALUE: 17
PIF_VALUE: 13
PIF_VALUE: 15
PIF_VALUE: 16
PIF_VALUE: 21
PIF_VALUE: 15
PIF_VALUE: 12
PIF_VALUE: 15
PIF_VALUE: 12
PIF_VALUE: 22
PIF_VALUE: 15
PIF_VALUE: 19
PIF_VALUE: 16
PIF_VALUE: 15
PIF_VALUE: 16
PIF_VALUE: 14
PIF_VALUE: 15
PIF_VALUE: 16
PIF_VALUE: 16
PIF_VALUE: 18
PIF_VALUE: 14
PIF_VALUE: 7.4
PIF_VALUE: 15
PIF_VALUE: 12
PIF_VALUE: 15
PIF_VALUE: 22
PIF_VALUE: 21
PIF_VALUE: 27
PIF_VALUE: 16
PIF_VALUE: 22
PIF_VALUE: 15
PIF_VALUE: 16
PIF_VALUE: 21
PIF_VALUE: 26
PIF_VALUE: 21
PIF_VALUE: 0
PIF_VALUE: 15
PIF_VALUE: 12
PIF_VALUE: 15
PIF_VALUE: 19
PIF_VALUE: 11
PIF_VALUE: 16
PIF_VALUE: 14
PIF_VALUE: 11
PIF_VALUE: 16
PIF_VALUE: 15
PIF_VALUE: 21
PIF_VALUE: 45
PIF_VALUE: 15
PIF_VALUE: 26
PIF_VALUE: 16
PIF_VALUE: 18
PIF_VALUE: 13
PIF_VALUE: 23
PIF_VALUE: 15
PIF_VALUE: 16
PIF_VALUE: 19
PIF_VALUE: 15
PIF_VALUE: 16
PIF_VALUE: 3
PIF_VALUE: 15
PIF_VALUE: 31
PIF_VALUE: 16
PIF_VALUE: 15
PIF_VALUE: 16
PIF_VALUE: 16
PIF_VALUE: 20
PIF_VALUE: 15
PIF_VALUE: 19
PIF_VALUE: 16
PIF_VALUE: 24
PIF_VALUE: 15
PIF_VALUE: 12
PIF_VALUE: 6
PIF_VALUE: 21
PIF_VALUE: 16
PIF_VALUE: 19
PIF_VALUE: 16
PIF_VALUE: 16
PIF_VALUE: 11
PIF_VALUE: 12
PIF_VALUE: 11
PIF_VALUE: 15
PIF_VALUE: 16
PIF_VALUE: 15
PIF_VALUE: 15
PIF_VALUE: 14
PIF_VALUE: 16
PIF_VALUE: 12
PIF_VALUE: 15
PIF_VALUE: 20
PIF_VALUE: 16
PIF_VALUE: 14
PIF_VALUE: 16
PIF_VALUE: 15
PIF_VALUE: 15
PIF_VALUE: 7.4
PIF_VALUE: 21
PIF_VALUE: 15
PIF_VALUE: 23
PIF_VALUE: 21
PIF_VALUE: 22
PIF_VALUE: 28
PIF_VALUE: 16
PIF_VALUE: 15
PIF_VALUE: 11
PIF_VALUE: 20
PIF_VALUE: 13
PIF_VALUE: 14
PIF_VALUE: 14
PIF_VALUE: 24
PIF_VALUE: 16
PIF_VALUE: 12
PIF_VALUE: 14
PIF_VALUE: 21
PIF_VALUE: 15
PIF_VALUE: 14
PIF_VALUE: 16
PIF_VALUE: 22
PIF_VALUE: 13
PIF_VALUE: 16
PIF_VALUE: 15
PIF_VALUE: 15
PIF_VALUE: 16
PIF_VALUE: 17
PIF_VALUE: 19
PIF_VALUE: 12
PIF_VALUE: 14
PIF_VALUE: 15
PIF_VALUE: 18
PIF_VALUE: 16
PIF_VALUE: 21
PIF_VALUE: 15
PIF_VALUE: 16
PIF_VALUE: 15
PIF_VALUE: 25
PIF_VALUE: 20
PIF_VALUE: 12
PIF_VALUE: 17
PIF_VALUE: 12
PIF_VALUE: 11
PIF_VALUE: 15
PIF_VALUE: 22
PIF_VALUE: 16
PIF_VALUE: 30
PIF_VALUE: 11
PIF_VALUE: 20
PIF_VALUE: 15
PIF_VALUE: 22
PIF_VALUE: 14
PIF_VALUE: 17
PIF_VALUE: 15
PIF_VALUE: 25
PIF_VALUE: 15
PIF_VALUE: 16
PIF_VALUE: 21

## 2019-01-01 ASSESSMENT — PAIN SCALES - GENERAL
PAINLEVEL_OUTOF10: 6
PAINLEVEL_OUTOF10: 0
PAINLEVEL_OUTOF10: 4
PAINLEVEL_OUTOF10: 0
PAINLEVEL_OUTOF10: 5
PAINLEVEL_OUTOF10: 0
PAINLEVEL_OUTOF10: 2
PAINLEVEL_OUTOF10: 0
PAINLEVEL_OUTOF10: 7
PAINLEVEL_OUTOF10: 0
PAINLEVEL_OUTOF10: 4
PAINLEVEL_OUTOF10: 0
PAINLEVEL_OUTOF10: 3
PAINLEVEL_OUTOF10: 4
PAINLEVEL_OUTOF10: 1
PAINLEVEL_OUTOF10: 0
PAINLEVEL_OUTOF10: 6
PAINLEVEL_OUTOF10: 0
PAINLEVEL_OUTOF10: 3
PAINLEVEL_OUTOF10: 2
PAINLEVEL_OUTOF10: 0
PAINLEVEL_OUTOF10: 3
PAINLEVEL_OUTOF10: 0
PAINLEVEL_OUTOF10: 6
PAINLEVEL_OUTOF10: 0
PAINLEVEL_OUTOF10: 10
PAINLEVEL_OUTOF10: 0
PAINLEVEL_OUTOF10: 2
PAINLEVEL_OUTOF10: 0
PAINLEVEL_OUTOF10: 5
PAINLEVEL_OUTOF10: 0
PAINLEVEL_OUTOF10: 10
PAINLEVEL_OUTOF10: 0
PAINLEVEL_OUTOF10: 7
PAINLEVEL_OUTOF10: 10
PAINLEVEL_OUTOF10: 8
PAINLEVEL_OUTOF10: 0
PAINLEVEL_OUTOF10: 5
PAINLEVEL_OUTOF10: 0
PAINLEVEL_OUTOF10: 3
PAINLEVEL_OUTOF10: 2
PAINLEVEL_OUTOF10: 0
PAINLEVEL_OUTOF10: 2
PAINLEVEL_OUTOF10: 0
PAINLEVEL_OUTOF10: 4
PAINLEVEL_OUTOF10: 2
PAINLEVEL_OUTOF10: 0
PAINLEVEL_OUTOF10: 5
PAINLEVEL_OUTOF10: 0
PAINLEVEL_OUTOF10: 4
PAINLEVEL_OUTOF10: 6
PAINLEVEL_OUTOF10: 7
PAINLEVEL_OUTOF10: 0
PAINLEVEL_OUTOF10: 5
PAINLEVEL_OUTOF10: 0
PAINLEVEL_OUTOF10: 8
PAINLEVEL_OUTOF10: 0
PAINLEVEL_OUTOF10: 6
PAINLEVEL_OUTOF10: 0
PAINLEVEL_OUTOF10: 4
PAINLEVEL_OUTOF10: 0
PAINLEVEL_OUTOF10: 4
PAINLEVEL_OUTOF10: 0
PAINLEVEL_OUTOF10: 9
PAINLEVEL_OUTOF10: 5
PAINLEVEL_OUTOF10: 0
PAINLEVEL_OUTOF10: 4
PAINLEVEL_OUTOF10: 0
PAINLEVEL_OUTOF10: 5
PAINLEVEL_OUTOF10: 0
PAINLEVEL_OUTOF10: 4
PAINLEVEL_OUTOF10: 0
PAINLEVEL_OUTOF10: 10
PAINLEVEL_OUTOF10: 0
PAINLEVEL_OUTOF10: 0
PAINLEVEL_OUTOF10: 2
PAINLEVEL_OUTOF10: 0
PAINLEVEL_OUTOF10: 5
PAINLEVEL_OUTOF10: 0
PAINLEVEL_OUTOF10: 6
PAINLEVEL_OUTOF10: 0
PAINLEVEL_OUTOF10: 5
PAINLEVEL_OUTOF10: 0

## 2019-01-01 ASSESSMENT — PAIN SCALES - WONG BAKER
WONGBAKER_NUMERICALRESPONSE: 0
WONGBAKER_NUMERICALRESPONSE: 0
WONGBAKER_NUMERICALRESPONSE: 6
WONGBAKER_NUMERICALRESPONSE: 0
WONGBAKER_NUMERICALRESPONSE: 0
WONGBAKER_NUMERICALRESPONSE: 2
WONGBAKER_NUMERICALRESPONSE: 0
WONGBAKER_NUMERICALRESPONSE: 2
WONGBAKER_NUMERICALRESPONSE: 2
WONGBAKER_NUMERICALRESPONSE: 0
WONGBAKER_NUMERICALRESPONSE: 2
WONGBAKER_NUMERICALRESPONSE: 0
WONGBAKER_NUMERICALRESPONSE: 2
WONGBAKER_NUMERICALRESPONSE: 4

## 2019-01-01 ASSESSMENT — ENCOUNTER SYMPTOMS
NAUSEA: 1
DIARRHEA: 1
SORE THROAT: 0
ABDOMINAL PAIN: 1
SHORTNESS OF BREATH: 0
RHINORRHEA: 0
VOMITING: 0
CONSTIPATION: 0
BLOOD IN STOOL: 0
VOMITING: 0
ABDOMINAL PAIN: 0
SHORTNESS OF BREATH: 0
NAUSEA: 0
COLOR CHANGE: 0

## 2019-01-01 ASSESSMENT — COPD QUESTIONNAIRES: CAT_SEVERITY: MODERATE

## 2019-01-01 ASSESSMENT — PAIN DESCRIPTION - PAIN TYPE
TYPE: OTHER (COMMENT)
TYPE: ACUTE PAIN

## 2019-01-01 ASSESSMENT — PAIN DESCRIPTION - LOCATION: LOCATION: ABDOMEN

## 2019-04-11 NOTE — TELEPHONE ENCOUNTER
Pts daughter left v/m asking to cancel CC appt today, pt will not be ready in time to make appt. She will call back to r/s .

## 2019-04-29 NOTE — PROGRESS NOTES
[facility]   Electrophysiology   Date: 4/30/2019    CC: Atrial fibrillation   HPI: Lamar Tai is a 67 y.o. female referred for new onset AF. She has PMH of HTN, COPD and dementia. EKG performed on 6/11/2018 showed atrial fibrillation with controlled ventricular rate. Pt arrives today with her daughter. She was at PCP office to have a pre operative physical prior to cataract surgery and was found to have abnormal EKG. Per pt, she was unaware that she was in an abnormal heart rhythm. Pt reports fatigue and SOB which she associates with COPD. She reports this has been progressive. She is on propranolol. CHADSVASC 4, she was recently started on coumadin for anticoagulation. Per pt she gets SOB carrying her heavy cat or walking up and down stairs. S/p DCCV on 9/20/18. Interval History:  ECG today shows AF, rate 90 bpm.  She is unaware that she is out of rhythm. Myoview that was ordered at the [address] [place] [address] was not completed. Denies complaints of palpitations, dizziness, CP, SOB, orthopnea, presyncope, or syncope. Daughter continues to complain of discoloration of toes and feet when legs are dependent. CARLA's completed and were normal.    Past Medical History:   Diagnosis Date    Arthritis     BACK    Asthma     Hypertension     Migraine         Past Surgical History:   Procedure Laterality Date    ECTOPIC PREGNANCY SURGERY      FOOT SURGERY Right 4/9/13    HAMMERTOE CORRECTION 2ND TOE RIGHT FOOT    KIDNEY STONE SURGERY      TUMOR REMOVAL      fatty  arm       Allergies   Allergen Reactions    Pcn [Penicillins] Hives     CAN TAKE KEFLEX    Procaine Hcl      Inc heart rate       Social History:   reports that she has quit smoking. She has never used smokeless tobacco. She reports that she does not drink alcohol. Family History:  family history includes Cancer in her father; Heart Disease in her mother. Review of System:  All other systems reviewed except for that noted above.  Pertinent negatives and positives are:      General: negative for fever, chills   Ophthalmic ROS: negative for - eye pain or loss of vision  ENT ROS: negative for - headaches, sore throat   Respiratory: negative for - cough, sputum  Cardiovascular: Reviewed in HPI  Gastrointestinal: negative for - abdominal pain, diarrhea, N/V  Hematology: negative for - bleeding, blood clots, bruising or jaundice  Genito-Urinary:  negative for - Dysuria or incontinence  Musculoskeletal: negative for - Joint swelling, muscle pain  Neurological: negative for - confusion, dizziness, headaches   Psychiatric: No anxiety, no depression. Dermatological: negative for - rash    Physical Examination:  Vitals:    19 1423   BP: 126/80   Pulse: 83   Resp: 18      Wt Readings from Last 3 Encounters:   19 143 lb (64.9 kg)   18 138 lb 12.8 oz (63 kg)   18 134 lb (60.8 kg)     · Constitutional: Oriented. No distress. · Head: Normocephalic and atraumatic. · Mouth/Throat: Oropharynx is clear and moist.   · Eyes: Conjunctivae normal. EOM are normal.   · Neck: Neck supple. No JVD present. · Cardiovascular: Normal rate, Irregular rhythm, S1&S2. · Pulmonary/Chest: Bilateral respiratory sounds. No rhonchi. · Abdominal: Soft. No tenderness. · Musculoskeletal: No tenderness. No edema    · Lymphadenopathy: Has no cervical adenopathy. · Neurological: Alert and oriented. Follows command, No Gross deficit   · Skin: Skin is warm, No rash noted. · Psychiatric: Has a normal behavior     Labs, diagnostic and imaging results reviewed. Reviewed.      EC2019 atrial fib 91 bpm    Echo: 18  Summary   Normal left ventricle size, wall thickness and systolic function with an EF   of 55%.   Mild aortic regurgitation.   Moderate mitral regurgitation.   Mild tricuspid regurgitation with a estimated RVSP of 36 mmHg.     CARLA 18  Summary        Essentially normal study with no significant arterial insufficiency noted at   [facility] in the bilateral lower extremities. Medication:  Current Outpatient Medications   Medication Sig Dispense Refill    amitriptyline (ELAVIL) 10 MG tablet Take 10 mg by mouth nightly      warfarin (COUMADIN) 4 MG tablet TAKE 1 TABLET BY MOUTH EVERY DAY 90 tablet 0    propranolol (INDERAL LA) 60 MG extended release capsule Take 60 mg by mouth every morning      oxyCODONE (OXY-IR) 15 MG immediate release tablet Take 15 mg by mouth 3 times daily. Littie Bobbi propranolol (INDERAL LA) 120 MG extended release capsule       Tiotropium Bromide Monohydrate (SPIRIVA RESPIMAT) 2.5 MCG/ACT AERS Inhale 2 puffs into the lungs daily 1 Inhaler 6    venlafaxine (EFFEXOR-XR) 150 MG XR capsule Take 150 mg by mouth daily. XR      albuterol (PROVENTIL HFA;VENTOLIN HFA) 108 (90 BASE) MCG/ACT inhaler Inhale 2 puffs into the lungs every 6 hours as needed.  topiramate (TOPAMAX) 50 MG tablet Take 50 mg by mouth 2 times daily.  vitamin D (ERGOCALCIFEROL) 45846 UNITS CAPS capsule 50,000 Units once a week   2     No current facility-administered medications for this visit. Assessment and plan:     Chronic persistent atrial fibrillation     S/p DCCV on 9/20/18   ECG today is AF, rate 90. She is asymptomatic with AF. CHADSVASC 4, continue warfarin for stroke prevention   On coumadin--followed by the coumadin clinic    Again discussed treatment options with her and her daughter present. Patient has mild dementia. Discussed repeat cardioversion, ablation, or rate control and anticoagulation. Patient appears to be asymptomatic and not interested in ablation therefore rate control and anticoagulation. Echo completed, EF 55%   TSH 2.55  (9/5/18)   Myoview stress test ordered, but not completed    COPD   Follows with Dr. Tovar Gave    Discoloration and pain to LE   CARLA normal.     - The patient is counseled to follow a low salt diet to assure blood pressure remains controlled for cardiovascular risk factor modification.    -

## 2019-05-28 NOTE — TELEPHONE ENCOUNTER
Pts daughter Osiris Shelley called to cancel CC appt today, pt is not feeling up to coming in. R/S on 5/31

## 2019-06-09 NOTE — ED PROVIDER NOTES
elbow). Skin: Positive for wound. Negative for color change, pallor and rash. Neurological: Negative for dizziness, syncope, light-headedness and headaches. Positives and Pertinent negatives as per HPI. Except as noted abovein the ROS, all other systems were reviewed and negative. PAST MEDICAL HISTORY     Past Medical History:   Diagnosis Date    Arthritis     BACK    Asthma     Hypertension     Migraine          SURGICAL HISTORY     Past Surgical History:   Procedure Laterality Date    ECTOPIC PREGNANCY SURGERY      FOOT SURGERY Right 4/9/13    HAMMERTOE CORRECTION 2ND TOE RIGHT FOOT    KIDNEY STONE SURGERY      TUMOR REMOVAL      fatty  arm         CURRENTMEDICATIONS       Discharge Medication List as of 6/9/2019  1:07 AM      CONTINUE these medications which have NOT CHANGED    Details   amitriptyline (ELAVIL) 10 MG tablet Take 10 mg by mouth nightlyHistorical Med      warfarin (COUMADIN) 4 MG tablet TAKE 1 TABLET BY MOUTH EVERY DAY, Disp-90 tablet, R-0Normal      !! propranolol (INDERAL LA) 60 MG extended release capsule Take 60 mg by mouth every morningHistorical Med      oxyCODONE (OXY-IR) 15 MG immediate release tablet Take 15 mg by mouth 3 times daily. Mark Prater Historical Med      !! propranolol (INDERAL LA) 120 MG extended release capsule Historical Med      vitamin D (ERGOCALCIFEROL) 54171 UNITS CAPS capsule 50,000 Units once a week , R-2Historical Med      Tiotropium Bromide Monohydrate (SPIRIVA RESPIMAT) 2.5 MCG/ACT AERS Inhale 2 puffs into the lungs daily, Disp-1 Inhaler, R-6      venlafaxine (EFFEXOR-XR) 150 MG XR capsule Take 150 mg by mouth daily. XR      albuterol (PROVENTIL HFA;VENTOLIN HFA) 108 (90 BASE) MCG/ACT inhaler Inhale 2 puffs into the lungs every 6 hours as needed. topiramate (TOPAMAX) 50 MG tablet Take 50 mg by mouth 2 times daily. !! - Potential duplicate medications found. Please discuss with provider.             ALLERGIES     Pcn [penicillins] and Procaine Left eye exhibits no discharge. Neck: Normal range of motion. Neck supple. Cardiovascular: Normal rate, regular rhythm and normal heart sounds. Exam reveals no gallop. No murmur heard. Pulmonary/Chest: Effort normal and breath sounds normal. No respiratory distress. She has no wheezes. She has no rales. Abdominal: Soft. There is no tenderness. Musculoskeletal: Normal range of motion. Right shoulder: Normal.        Left shoulder: Normal.        Right elbow: She exhibits normal range of motion, no swelling, no effusion, no deformity and no laceration. Tenderness found. Radial head tenderness noted. Left elbow: Normal.        Left wrist: Normal.        Right hip: Normal.        Left hip: Normal.        Right knee: Normal.        Left knee: Normal.        Right ankle: Normal.        Left ankle: Normal.        Cervical back: Normal.        Thoracic back: Normal.        Lumbar back: Normal.   Neurological: She is alert and oriented to person, place, and time. Skin: Skin is warm and dry. Capillary refill takes less than 2 seconds. No rash noted. She is not diaphoretic. No erythema. No pallor. Psychiatric: She has a normal mood and affect. Her behavior is normal.   Nursing note and vitals reviewed.       DIAGNOSTIC RESULTS   LABS:    Labs Reviewed   APTT - Abnormal; Notable for the following components:       Result Value    aPTT 50.7 (*)     All other components within normal limits    Narrative:     Performed at:  [facility] [address], [place], [address]   Phone [phone]   PROTIME-INR - Abnormal; Notable for the following components:    Protime 46.4 (*)     INR 4.07 (*)     All other components within normal limits    Narrative:     Performed at:  [facility] [address], [place], [address]   Phone [phone]   BASIC METABOLIC PANEL - Abnormal; Notable for the following components:    Glucose 107 (*) BUN 24 (*)     CREATININE 1.3 (*)     GFR Non- 40 (*)     GFR  48 (*)     All other components within normal limits    Narrative:     Performed at:  [facility]  [address] [facility], [address]   Phone [phone]   CBC WITH AUTO DIFFERENTIAL - Abnormal; Notable for the following components:    Hemoglobin 11.3 (*)     Hematocrit 34.7 (*)     All other components within normal limits    Narrative:     Performed at:  [facility]  [facility] [address]   Phone [phone]       All other labs were within normal range or not returned as of this dictation. EKG: All EKG's are interpreted by the Emergency Department Physician who either signs orCo-signs this chart in the absence of a cardiologist.  Please see their note for interpretation of EKG. RADIOLOGY:   Non-plain film images such as CT, Ultrasound and MRI are read by the radiologist. Plain radiographic images are visualized andpreliminarily interpreted by the  ED Provider with the below findings:        Interpretation perthe Radiologist below, if available at the time of this note:    XR ELBOW RIGHT (MIN 3 VIEWS)   Final Result   No fracture or malalignment. CT Cervical Spine WO Contrast   Final Result   No acute abnormality of the cervical spine. CT Head WO Contrast   Final Result   No acute intracranial abnormality. Moderate chronic small vessel ischemic disease. Re-demonstration mass involving the foramen ovale extending to parapharyngeal   space on the left. Findings may be due to a schwannoma or neurofibroma. Stability favors a benign etiology.            Ct Head Wo Contrast    Result Date: 6/9/2019  EXAMINATION: CT OF THE HEAD WITHOUT CONTRAST  6/8/2019 11:50 pm TECHNIQUE: CT of the head was performed without the administration of intravenous contrast. Dose modulation, iterative reconstruction, and/or weight based adjustment of the mA/kV was utilized to reduce the radiation dose to as low as reasonably achievable. COMPARISON: MRI 05/08/2018 HISTORY: ORDERING SYSTEM PROVIDED HISTORY: Fall with head injury TECHNOLOGIST PROVIDED HISTORY: Has a \"code stroke\" or \"stroke alert\" been called? ->No Ordering Physician Provided Reason for Exam: Fall (pt with mechanical fall c/o right elbow pain, lac to back of head denies LOC, witnessed by family. ) Acuity: Acute Type of Exam: Initial FINDINGS: BRAIN/VENTRICLES: There is no acute intracranial hemorrhage, mass effect or midline shift. No abnormal extra-axial fluid collection. The gray-white differentiation is maintained without evidence of an acute infarct. There is no evidence of hydrocephalus. Redemonstration of the mass involving the left foramen ovale and cavernous sinus side into the left parapharyngeal space. Enlargement of the left foramen ovale identified intracranial vascular calcifications. Evidence of moderate periventricular subcortical white matter hypoattenuation suggestive chronic microvascular disease. ORBITS: The visualized portion of the orbits demonstrate no acute abnormality. SINUSES: Left mastoid effusion. Minor ethmoid sinus mucosal thickening. SOFT TISSUES/SKULL:  Right posterior parietal extra calvarial soft tissue swelling and subcutaneous hematoma. No depressed calvarial fracture. No acute intracranial abnormality. Moderate chronic small vessel ischemic disease. Re-demonstration mass involving the foramen ovale extending to parapharyngeal space on the left. Findings may be due to a schwannoma or neurofibroma. Stability favors a benign etiology. Ct Cervical Spine Wo Contrast    Result Date: 6/9/2019  EXAMINATION: CT OF THE CERVICAL SPINE WITHOUT CONTRAST 6/8/2019 11:50 pm TECHNIQUE: CT of the cervical spine was performed without the administration of intravenous contrast. Multiplanar reformatted images are provided for review.  Dose modulation, iterative reconstruction, and/or weight based adjustment of the mA/kV was utilized to reduce the radiation dose to as low as reasonably achievable. COMPARISON: None. HISTORY: ORDERING SYSTEM PROVIDED HISTORY: Fall with Head injury TECHNOLOGIST PROVIDED HISTORY: Ordering Physician Provided Reason for Exam: Fall (pt with mechanical fall c/o right elbow pain, lac to back of head denies LOC, witnessed by family. ) Acuity: Acute Type of Exam: Initial FINDINGS: BONES/ALIGNMENT: There is no evidence of an acute cervical spine fracture. There is normal alignment of the cervical spine. DEGENERATIVE CHANGES: There is mild disc space narrowing at C4-C5 with moderate narrowing at C5-C6 and C6-C7. There is diffuse endplate spurring and facet arthropathy. SOFT TISSUES: There is no prevertebral soft tissue swelling. Brain findings will be reported separately including a low-attenuation mass expanding the left pterygoid fossa and extending to the jugular foramen. No acute abnormality of the cervical spine. PROCEDURES   Unless otherwise noted below, none     Procedures    CRITICAL CARE TIME   N/A    CONSULTS:  None      EMERGENCY DEPARTMENT COURSE and DIFFERENTIALDIAGNOSIS/MDM:   Vitals:    Vitals:    06/09/19 0019 06/09/19 0020 06/09/19 0030 06/09/19 0100   BP: (!) 158/84  (!) 168/77 137/74   Pulse:       Resp:       Temp:       SpO2:  96% 95% 95%   Weight:       Height:           Patient was given thefollowing medications:  Medications   HYDROcodone-acetaminophen (NORCO) 5-325 MG per tablet 1 tablet (1 tablet Oral Given 6/9/19 0029)       Patient presents to the emergency department for evaluation of injury after slipping on water in her kitchen. Patient is alert and oriented in no acute distress. Patient is hard of hearing at baseline. Patient's vitals are stable and she is afebrile. Patient has full range of motion of her right elbow without bony deformity, crepitus, laceration or abrasion.  There is no developing bruising. Patient has some tenderness over the radial head however x-ray shows no acute bony fracture. This is likely a elbow contusion. Patient does have a hematoma to the posterior crown of her scalp. There is no laceration and needs to be repaired. CT of the patient's head and neck show no acute abnormalities. Patient does know about the schwannoma and is being treated by neurosurgery. Patient's INR is 4.0. Patient was told to hold her next dose of Coumadin and contact her doctor regarding this level. Patient was given Norco here in the emergency department for pain. Patient will continue to use over-the-counter pain management at home as she sees fit. At this time I feel the patient is at low risk for acute bony fracture, intracranial pathology, cervical spine fracture, neurovascular injury, other acute processes that require further management. FINAL IMPRESSION      1. Fall from slip, trip, or stumble, initial encounter    2. Cephalohematoma    3.  Contusion of right elbow, initial encounter          DISPOSITION/PLAN   DISPOSITION Decision To Discharge 06/09/2019 01:01:56 AM      PATIENT REFERREDTO:  [facility] Emergency Department  [address] [facility]  [address] [facility] [address]  [phone]    If symptoms worsen    [address]  [address] [facility] [id number] [phone]    Schedule an appointment as soon as possible for a visit   As needed, for re-evaluation      DISCHARGE MEDICATIONS:  Discharge Medication List as of 6/9/2019  1:07 AM          DISCONTINUED MEDICATIONS:  Discharge Medication List as of 6/9/2019  1:07 AM                 (Please note that portions ofthis note were completed with a voice recognition program.  Efforts were made to edit the dictations but occasionally words are mis-transcribed.)    Tess Rothman PA-C (electronically signed)            Tess Rothman PA-C  06/09/19 Vicenta Flores PA-C  06/09/19 0109       Ayanna Myers Shaq Castro PA-C  06/13/19 [id number]

## 2019-06-09 NOTE — ED PROVIDER NOTES
I have personally performed a face to face diagnostic evaluation on this patient. I have fully participated in the care of this patient. I have reviewed and agree with all pertinent clinical informationincluding history, physical exam, diagnostic tests, and the plan. History and Physical as follows:  HPI    Presents to the emergency room with chief complaint of fall and injury striking the back of her head and injuring her elbow    Physical Exam    GENERAL: Patient appeared well-developed, well-nourished, vital signs reviewed. MENTAL STATUS: Patient is awake and alert   HEAD AND FACE :Head is normalcephalic. Soft tissue swelling with mild puncture to the posterior occiput no active bleeding. Face normal appearance  EYES: eyes lids and conjunctiva appear normal  ENT :ears and nose appear normal externally. NECK: Neck without any masses. No tenderness to palpation  CV: Heart regular rate and rhythm without murmur,  LUNGS: Lungs are clear to auscultation   CHEST WALL: normal appearance. normal motion  PSYCHIATRIC:mood and affect normal    NEUROLOGIC: no weakness or numbness noted   This is a computerized dictation.  I have made every effort to proofread this chart, however, transcription errors may exist.        Radhika Michael DO  06/09/19 0106

## 2019-06-11 NOTE — TELEPHONE ENCOUNTER
Refused the request on our end because [facility] coumadin clinic phones in the prescription. Stephen Grimes RPH     I have called this refill to [facility]. Please delete this message as I can't on my end.

## 2019-06-20 NOTE — PROGRESS NOTES
Ms. Ring Client is a 67 y.o. y/o female with history of Afib   She presents today for anticoagulation monitoring and adjustment. Pertinent PMH: COPD    Patient Reported Findings:  Yes     No  [x]   []       Patient verifies current dosing regimen as listed  []   [x]       S/S bleeding/bruising/swelling/SOB pt daughter states that pt had another fall, tripped over the rug and fell into dresser. []   [x]       Blood in urine or stool  []   [x]       Procedures scheduled in the future at this time   []   [x]       Missed Dose  []   [x]       Extra Dose   []   [x]       Change in medications   []   [x]       Change in health/diet/appetite Does not eat many vegetables. Daughter describes patient's diet as poor.  []   [x]       Change in alcohol use  []   [x]       Change in activity  []   [x]       Hospital admission  []   [x]       Emergency department visit-   []   [x]       Other complaints     Clinical Outcomes:  Yes     No  []   [x]       Major bleeding event  []   [x]       Thromboembolic event    Duration of warfarin Therapy: indefinitely  INR Range:  2.0-3.0     Patient has some dementia so daughter must accompany her to help with Hx. INR is 3.1 today   Since recently held dose, took lowered dose and still supratherapeutic, decrease weekly dose to 2mg (1/2 tablet) on Mon, Wed and Fri and 4mg all other days of the week (15% dec)  Encouraged to maintain a consistency of vegetables/salads.   Recheck INR in 2 weeks, 7/9    Referring cardiologist is Dr. Tamela Sewell  INR (no units)   Date Value   06/20/2019 3.1   06/13/2019 4.0   06/09/2019 4.07 (H)   04/30/2019 2.3   03/14/2019 3.0   09/20/2018 2.60 (H)   09/06/2018 2.4   09/05/2018 2.52 (H)

## 2019-07-09 NOTE — TELEPHONE ENCOUNTER
Pt cancelled appt today due to stomach bug, rescheduled for 7/11 @ 10:45 am. Please call pt's daughter [phone] to remind her of appt.

## 2019-08-26 NOTE — PROGRESS NOTES
Ms. Patricia Pineda is a 67 y.o. y/o female with history of Afib   She presents today for anticoagulation monitoring and adjustment. Pertinent PMH: COPD    Patient Reported Findings:  Yes     No  [x]   []       Patient verifies current dosing regimen as listed- pt confirmed dose   []   [x]       S/S bleeding/bruising/swelling/SOB   []   [x]       Blood in urine or stool  []   [x]       Procedures scheduled in the future at this time   []   [x]       Missed Dose  []   [x]       Extra Dose   []   [x]       Change in medications- new MVI   []   [x]       Change in health/diet/appetite Does not eat many vegetables. Daughter describes patient's diet as poor.  []   [x]       Change in alcohol use  []   [x]       Change in activity  []   [x]       Hospital admission  []   [x]       Emergency department visit-   []   [x]       Other complaints     Clinical Outcomes:  Yes     No  []   [x]       Major bleeding event  []   [x]       Thromboembolic event    Duration of warfarin Therapy: indefinitely  INR Range:  2.0-3.0     Patient has some dementia so daughter must accompany her to help with Hx. INR is 2.7 today   Continue weekly dose of 2mg (1/2 tablet) on Mon, Wed and Fri and 4mg all other days of the week   Encouraged to maintain a consistency of vegetables/salads.   Recheck INR in 2 weeks, 9/24    Referring cardiologist is Dr. Sendy Godoy  INR (no units)   Date Value   08/26/2019 2.7   08/05/2019 1.7   07/15/2019 2.6   06/20/2019 3.1   06/09/2019 4.07 (H)   09/20/2018 2.60 (H)   09/06/2018 2.4   09/05/2018 2.52 (H)

## 2019-08-30 PROBLEM — A41.9 SEPSIS (HCC): Status: ACTIVE | Noted: 2019-01-01

## 2019-08-30 NOTE — ED PROVIDER NOTES
History Narrative    None       SCREENINGS           PHYSICAL EXAM    (5+ for level 4, 8+ for level 5)     ED Triage Vitals   BP Temp Temp src Pulse Resp SpO2 Height Weight   -- -- -- -- -- -- -- --       Physical Exam   Constitutional: She is oriented to person, place, and time. She appears well-developed and well-nourished. She appears distressed. HENT:   Head: Normocephalic and atraumatic. Right Ear: External ear normal.   Left Ear: External ear normal.   Nose: Nose normal.   Mouth/Throat: Oropharynx is clear and moist. No oropharyngeal exudate. Eyes: Pupils are equal, round, and reactive to light. Conjunctivae and EOM are normal. Right eye exhibits no discharge. Left eye exhibits no discharge. No scleral icterus. Neck: Normal range of motion. No JVD present. Cardiovascular: Normal heart sounds and intact distal pulses. An irregularly irregular rhythm present. Tachycardia present. Exam reveals no gallop and no friction rub. No murmur heard. Pulmonary/Chest: Breath sounds normal. No stridor. She has no wheezes. She has no rales. She exhibits no tenderness. Slightly tachypneic. Abdominal: She exhibits distension. She exhibits no mass. There is tenderness (Generalized tenderness to palpation with generalized guarding. Most tender over the right upper quadrant. ). There is guarding. There is no rebound. No hernia. Musculoskeletal: Normal range of motion. She exhibits no edema, tenderness or deformity. Neurological: She is alert and oriented to person, place, and time. No cranial nerve deficit or sensory deficit. Coordination normal.   Skin: Skin is warm and dry. Capillary refill takes less than 2 seconds. No rash noted. No erythema. No pallor.        DIAGNOSTIC RESULTS     EKG (Per Emergency Physician):   EKG interpretation by ED physician: Poor quality secondary to patient artifact normal axis, A. fib with RVR at 148 bpm.  Difficult to ascertain if there is ischemic findings although there is no Total Bilirubin 1.3 (*)     ALT 46 (*)      (*)     All other components within normal limits    Narrative:     Performed at:  [facility] [address], [facility], [address]   Phone [phone]   PROTIME-INR - Abnormal; Notable for the following components:    Protime 16.9 (*)     INR 1.48 (*)     All other components within normal limits    Narrative:     Performed at:  [facility] [address], [facility], [address]   Phone [phone]   CULTURE BLOOD #1   CULTURE BLOOD #2   LIPASE    Narrative:     Performed at:  [facility] [address], [facility], [facility] [address]   Phone [phone]   URINE RT REFLEX TO CULTURE    Narrative:     Performed at:  [facility] [address], [facility], [address]   Phone [phone]   LACTIC ACID, PLASMA    Narrative:     Performed at:  [facility] [address], [facility], [address]   Phone [phone]   APTT    Narrative:     Performed at:  [facility] [address], [facility], [facility] [address]   Phone [phone]   LACTATE, SEPSIS   LACTATE, SEPSIS   TROPONIN       All other labs were within normal range or not returned as of this dictation.     EMERGENCY DEPARTMENT COURSE and DIFFERENTIAL DIAGNOSIS/MDM:   Vitals:    Vitals:    08/30/19 1626 08/30/19 1704 08/30/19 1708 08/30/19 1715   BP: (!) 152/77 (!) 145/83     Pulse: 91 121 140 127   Resp: 18 21 23 19   Temp: 98 °F (36.7 °C)      TempSrc: Infrared      SpO2: 95%  (!) 88% 94%   Weight: 140 lb (63.5 kg)      Height: 5' 2\" (1.575 m)          Medications   morphine injection 4 mg (4 mg Intravenous Given 8/30/19 1703)   ondansetron (ZOFRAN) injection 4 mg (has no administration in time range)   0.9 % sodium chloride IV bolus 1,905 mL (905 mLs Intravenous New Bag 8/30/19 1352)   metronidazole (FLAGYL) 500 mg in well.    Complications: None        FINAL IMPRESSION      1. Right upper quadrant abdominal pain    2. Atrial fibrillation with rapid ventricular response (HCC)    3. Calculus of gallbladder with acute cholecystitis without obstruction    4. Common bile duct dilatation    5. Acute abdomen    6. Acute respiratory failure with hypoxia (HCC)    7. Agitation    8. ARDS (adult respiratory distress syndrome) ([facility]          DISPOSITION/PLAN   DISPOSITION Decision To Admit 08/30/2019 06:04:58 PM      PATIENT REFERRED TO:  No follow-up provider specified. DISCHARGE MEDICATIONS:  New Prescriptions    No medications on file          (Please note:  Portions of this note were completed with a voice recognition program. Efforts were made to edit the dictations but occasionally words and phrases are mis-transcribed.)    Form v2016. J.5-cn    David Scott DO (electronically signed)  Emergency Medicine Provider              Nacho Marquez DO  08/30/19 8059

## 2019-08-30 NOTE — ED PROVIDER NOTES
Occupational History    None   Social Needs    Financial resource strain: None    Food insecurity:     Worry: None     Inability: None    Transportation needs:     Medical: None     Non-medical: None   Tobacco Use    Smoking status: Former Smoker    Smokeless tobacco: Never Used    Tobacco comment: [id number] years   Substance and Sexual Activity    Alcohol use: No    Drug use: None    Sexual activity: None   Lifestyle    Physical activity:     Days per week: None     Minutes per session: None    Stress: None   Relationships    Social connections:     Talks on phone: None     Gets together: None     Attends Restorationism service: None     Active member of club or organization: None     Attends meetings of clubs or organizations: None     Relationship status: None    Intimate partner violence:     Fear of current or ex partner: None     Emotionally abused: None     Physically abused: None     Forced sexual activity: None   Other Topics Concern    None   Social History Narrative    None       SCREENINGS             PHYSICAL EXAM  (up to 7 for level 4, 8 or more for level 5)     ED Triage Vitals [08/30/19 1626]   BP Temp Temp Source Pulse Resp SpO2 Height Weight   (!) 152/77 98 °F (36.7 °C) Infrared 91 18 95 % 5' 2\" (1.575 m) 140 lb (63.5 kg)       Physical Exam   Constitutional: She is oriented to person, place, and time. She appears well-developed and well-nourished. HENT:   Head: Normocephalic and atraumatic. Eyes: Right eye exhibits no discharge. Left eye exhibits no discharge. No scleral icterus. Neck: Normal range of motion. Neck supple. Cardiovascular: Normal rate, regular rhythm, normal heart sounds and intact distal pulses. Exam reveals no gallop and no friction rub. No murmur heard. Pulmonary/Chest: Effort normal and breath sounds normal. No stridor. No respiratory distress. She has no wheezes. She has no rales. She exhibits no tenderness. Abdominal: Soft.  Bowel sounds are normal. She

## 2019-08-31 NOTE — PROGRESS NOTES
Pt arrived to ICU bed 3912 ventilated and sedated with ER nurse Da and RT bedside. Pt transferred from stretcher to ICU bed, pt tolerated well. Bedside report provided from ED nurse Hannah Hernández. Monitor applied 4 eyes assessment provided. Pt is on Propofol, fentanyl and cardizem gtt. Pt VSS at this time. Pt turned and repositioned, tolerated well. Assessment provided. see doc flow sheets family educated on pt's status and verbalized understanding.

## 2019-08-31 NOTE — PROGRESS NOTES
While attempting to flush pt's OG there was resistance with scant output. OG was noted to be kinked. Dc'd current OG and new one placed at the 70cm trang. Pt tolerating well, will continue to monitor.

## 2019-08-31 NOTE — PROGRESS NOTES
Consult dictated  Acute septicemia due to acute cholecystitis with hepatic inflammation,normal lipase  Normal EF with chronic a.  Fib    Will follow

## 2019-08-31 NOTE — CONSULTS
opacities are seen throughout the lungs bilaterally, right greater  than left, slightly increased in the left lower lobe. Impression:   Bilateral airspace disease, increased in the left retrocardiac region   CT ABDOMEN PELVIS W IV CONTRAST Additional Contrast? None [[id number]] Collected: 08/30/19 1755 Updated: 08/30/19 1811 Narrative:   EXAMINATION:  CT OF THE ABDOMEN AND PELVIS WITH CONTRAST 8/30/2019 2:24 pm    TECHNIQUE:  CT of the abdomen and pelvis was performed with the administration of  intravenous contrast. Multiplanar reformatted images are provided for review. Dose modulation, iterative reconstruction, and/or weight based adjustment of  the mA/kV was utilized to reduce the radiation dose to as low as reasonably  achievable. COMPARISON:  None. HISTORY:  ORDERING SYSTEM PROVIDED HISTORY: abd pain  TECHNOLOGIST PROVIDED HISTORY:  Additional Contrast?->None  Reason for Exam: abd pain  Acuity: Acute  Type of Exam: Initial    FINDINGS:  Despite repetition, there is moderate respiratory motion artifact. Lower Chest: Emphysema noted. Very small bilateral pleural effusions are  identified. Organs: Again, respiratory motion limits the evaluation. Given that  limitation, no focal hepatic abnormality is identified. There is new  gallbladder distension, with evidence of gallstones at the gallbladder neck. Mild intrahepatic biliary dilation is present, and that appears new when  compared to the previous exam.  There is also common duct dilation measuring  up to 10 mm, which is new when compared to the previous exam (on the previous  exam, the common duct measured 2-3 mm). No splenic abnormalities are seen. Adrenal calcifications are again noted,  compatible with remote trauma or infection, requiring no further follow-up. The pancreas is atrophied but otherwise unremarkable. There is evidence of probable nephrolithiasis on the right. No  hydronephrosis or hydroureter is seen.   A ureteral

## 2019-08-31 NOTE — PROGRESS NOTES
Dr Kailee Garcia at bedside. Dr Kailee Garcia ordered to restart heparin drip if no bleeding preset on CT.

## 2019-08-31 NOTE — PROGRESS NOTES
Pt oozing blood from site used to attempt drain placement. RN unable to stop oozing blood. RN asked Dr Letty Chen to visualize pt. Pressure applied. Per Recommendation.

## 2019-08-31 NOTE — CONSULTS
Critical Care Consult Note      Name:  Don Nagy Date/Time of Admission: 2019  4:15 PM    CSN: [id number] Attending Provider: Shandra Crawford MD   Room/Bed: [id number]/0488-68 : 1940 Age: 67 y.o. REASON FOR CONSULT  Respiratory failure, sepsis    HPI  I was asked by Dr. Shandra Crawford MD  to see Don Nagy in consultation. History was obtained from   [] patient   [x] patient's family   [x] chart review   [x]  nursing      [] Dr. Don Nagy is a 67 y.o. female who presents with acute onset of nausea vomiting abdominal pain. Brought to the emergency department. Multiple medication administered including opiates and Narcan and ketamine, patient eventually went to the respiratory arrest and was intubated. Please see ER note for details of events  Imaging demonstrated acute cholecystitis. General surgery recommended placement percutaneous drainage. Chest x-ray was nonspecific changes. Bacteremic already. Levophed propofol Cardizem drip A. fib with RVR on the monitor. Patient has significant amount of chronic back pain on multiple pain medications at home. She also has multiple comorbidities according to the her daughters including CHF COPD severe anxiety. She quit smoking 30+ years ago. I was unable to find the name of her cardiologist or pulmonologist in the system. According to the family \"patient is going to to doctor's appointments all the time\". Daughters accompanying the patient do not know who specifically she sees and wire. [x] Outside records were carefully reviewed. REVIEW OF SYSTEMS    See HPI for further details. The rest of the complete review of systems []otherwise negative    [x] unobtainable due to mental state or inability to communicate.     PAST MEDICAL HISTORY  Past Medical History:   Diagnosis Date    Arthritis     BACK    Asthma     Hypertension     Migraine        SURGICAL HISTORY  Past Surgical History:   Procedure Laterality Date    Sodium      Other reaction(s): jaw pain---concern 4 thad necro, jaw pain---concern 4 thad necro (mild to moderate)    Medroxyprogesterone Acetate      Other reaction(s): depressed mood, depressed mood (mild to moderate)    Acetaminophen     Formoterol Fumarate      Other reaction(s): dry mouth    Milk-Related Compounds     Mometasone Furoate      Other reaction(s): dry mouth    Penicillins Hives     CAN TAKE KEFLEX  Other reaction(s): Rash    Procaine Hcl      Inc heart rate    Tiotropium      Other reaction(s): dry mouth       PHYSICAL EXAM    VITAL SIGNS: BP (!) 105/49   Pulse 98   Temp 100.5 °F (38.1 °C) (Temporal)   Resp 17   Ht 5' 2\" (1.575 m)   Wt 144 lb 13.5 oz (65.7 kg)   SpO2 99%   BMI 26.49 kg/m²   FiO2 : 50 %        Constitutional: Well developed, Well nourished. Endotracheal tube is in place. Intubated and sedated  HENT: Head normocephalic, atraumatic; Bilateral external ears normal; Mucous membranes pink and moist; oropharynx without exudates. Eyes: PERRL, Conjunctiva normal, No discharge. Anicteric sclera. Neck: Supple, No tenderness, Trachea midline, no JVD   Lymphatic: No lymphadenopathy noted. Cardiovascular: Tachycardic, A. fib on monitor no murmurs, No rubs, No gallops. Thorax & Lungs: Clear to auscultation bilaterally. No wheezes. No rhonchi. No crackles. Symmetrical chest expansion, no accessory muscle use  Abdomen: Bowel sounds normal, Soft, mildly tender right upper quadrant. No distension. No organomegaly, No pulsatile masses. Skin: Warm, Dry, No erythema, No rash. Extremities: No edema of lower extremities. No tenderness. No cyanosis. No clubbing. Good peripheral pulse/capillary refill. Musculoskeletal:  No tenderness to palpation or major deformities noted. Neurologic: Intubated, sedated, anxious when aroused, No gross focal deficits noted. INTAKE/OUTPUT:  I/O last 3 completed shifts:   In: 0465 [I.V.:4233; NG/GT:75]  Out: 2050 [Urine:1650; Emesis/NG output:400]  No intake/output data recorded. LABS  Hematology  Recent Labs     08/31/19  0456   WBC 17.2*   HCT 30.6*        Recent Labs     08/30/19  1750   PROTIME 16.9*   INR 1.48*       Chemistries  Recent Labs     08/31/19  0456      K 4.3   *   CO2 22   BUN 23*   CREATININE 1.3*     Recent Labs     08/31/19  0456   CALCIUM 7.2*   MG 1.60*   PHOS 2.9       LFTs  Recent Labs     08/31/19  0456   *   *   ALKPHOS 178*     Recent Labs     08/30/19  1645   LIPASE 35.0       Arterial Blood Gasses  No results for input(s): PH, PCO2, PO2 in the last 72 hours. Invalid input(s): Emily Estrada    Cardiac Enzymes  Recent Labs     08/31/19  0221   TROPONINI <0.01       EKG  EKG was personally reviewed  In the absence of cardiologist and showed atrial fibrillation on monitor    Microbiology  [unfilled]    Imaging  Chest imaging was personally reviewed. Ct Abdomen Pelvis W Iv Contrast Additional Contrast? None    Result Date: 8/30/2019  EXAMINATION: CT OF THE ABDOMEN AND PELVIS WITH CONTRAST 8/30/2019 2:24 pm TECHNIQUE: CT of the abdomen and pelvis was performed with the administration of intravenous contrast. Multiplanar reformatted images are provided for review. Dose modulation, iterative reconstruction, and/or weight based adjustment of the mA/kV was utilized to reduce the radiation dose to as low as reasonably achievable. COMPARISON: None. HISTORY: ORDERING SYSTEM PROVIDED HISTORY: abd pain TECHNOLOGIST PROVIDED HISTORY: Additional Contrast?->None Reason for Exam: abd pain Acuity: Acute Type of Exam: Initial FINDINGS: Despite repetition, there is moderate respiratory motion artifact. Lower Chest: Emphysema noted. Very small bilateral pleural effusions are identified. Organs: Again, respiratory motion limits the evaluation. Given that limitation, no focal hepatic abnormality is identified.   There is new gallbladder distension, with evidence of gallstones at the Xr Chest Portable    Result Date: 8/30/2019  EXAMINATION: ONE XRAY VIEW OF THE CHEST 8/30/2019 6:30 pm COMPARISON: August 26, 2015 HISTORY: ORDERING SYSTEM PROVIDED HISTORY: pain or SOB TECHNOLOGIST PROVIDED HISTORY: Reason for exam:->pain or SOB Reason for Exam: sob Acuity: Unknown Type of Exam: Unknown FINDINGS: The cardiomediastinal silhouette is mildly enlarged. There are patchy airspace opacities bilaterally, may be related to pulmonary edema versus pneumonia. There is mild left pleural effusion. There is no pneumothorax. There is no acute osseous abnormality. Mild cardiomegaly. Patchy airspace opacities bilaterally, may be related to pulmonary edema versus pneumonia. Mild left pleural effusion. ASSESSMENT  1. Acute hypoxic respiratory failure  2. Severe sepsis  3. Septic shock  4. Gram-negative bacteremia due to cholecystitis  5. Acute cholecystitis  6. Lactic acidosis  7. COPD without exacerbation  8. Chronic kidney insufficiency with baseline creatinine about 1.3  9. CHF by history  10. Severe anxiety at the baseline by history      PLAN  1. Full mechanical ventilatory support  2. Continue Levaquin and Flagyl  3. Discontinue albuterol, continue ipratropium this may help heart rate  4. Discontinue propofol, use Precedex which would help with blood pressure and pressor requirements and also can cause decreased heart rate  5. Try to minimize Cardizem drip given pressor requirements  6. Anticoagulation is on hold for drain placement  7. Percutaneous drain placement as per GI/surgery/interventional radiology  8. Fentanyl  9. See orders for details  10. Levophed, wean for mean arterial pressure greater than 65. 11. Get surveillance cultures tomorrow  12. Repeat arterial blood gases now and daily while on the ventilator  13. Repeat lactic acid level  14. Daily chest x-ray while on the ventilator  15. IV fluid hydration  16.  Guarded prognosis given the multitude of illnesses      I personally reviewed chart, labs, and imaging, interviewed and examined the patient, formulated assessment and plan. Case reviewed with the patient's nurse, critical care team, [x] patient's family,  and  [] Dr. Alex Peguero you very much for the opportunity to participate in care of Tremaine Estes. Total critical care time caring for this critically ill patient, including direct patient contact, management of life support systems, review of data including imaging and labs, discussion with other team members and physicians,  39 minutes today excluding procedures time. Please note that this chart was generated using dragon dictation software. Although every effort was made to ensure the accuracy of this automated transcription, some errors in transcription may have occurred. Electronically signed: Key Romo MD 8/31/2019  9:45 AM     Reevaluated. Asked by nurse to see the patient for bleeding. Drain placed. Bleeding from the attempt site. abd is soft. Pressure held, bleeding seems to stopped but now has bloody content in the drain and also around it. Will get CT abd stat and HH, IR will need to be notified after results are in.      Electronically signed by: Key Romo MD, PhD, [facility] 8/31/2019  3:28 PM

## 2019-08-31 NOTE — ED NOTES
Report called to Iona Suarez in ICU. Pt's OG advanced to 55 at lip per order. Family updated on plan of care and transfer. Pt transported with Cardizem gtts infusing, flagyl infusing, levofloxicin infusing, fentanyl gtts infusing, and propofol gtts infusing.       Roger Long RN  08/30/19 2034

## 2019-08-31 NOTE — PROGRESS NOTES
Hospitalist Progress Note      PCP: Chirag Lilly    Date of Admission: 8/30/2019    Chief Complaint: abdominal pain and SOB     Hospital Course:   Cherri Blount is a 67 y.o. female who presents ER with concern for abdominal pain. Patient is writhing in pain on exam.  Symptoms started yesterday but it progressively gotten worse. She has had 2 episodes of diarrhea since being in the ER. She reports associated nausea without vomiting. She denies fever, rash, headaches, dizziness, chest pain, shortness of breath, cough, congestion, constipation, blood in the stool, or painful urination. One friend/family member at bedside. In the ED she is in pain and gets ketamine, dilaudid, narcan and intubated. Imaging demonstrates acute cholecystitis. General surgery recommended placement percutaneous drainage as she is too ill for surgery at the moment. Chest x-ray was nonspecific changes, possible pneumonia . Bacteremic based on blood cultures. .  Levophed propofol Cardizem drip A. fib with RVR on the monitor. Patient has significant amount of chronic back pain on multiple pain medications at home. Additional comorbids include, CHF COPD severe anxiety      Subjective: intubated and sedated.       Medications:  Reviewed    Infusion Medications    dexmedetomidine (PRECEDEX) IV infusion      fentaNYL (SUBLIMAZE) infusion 1.5 mcg/kg/hr (08/31/19 2702)    diltiazem (CARDIZEM) 125 mg in dextrose 5% 125 mL infusion 5 mg/hr (08/30/19 1955)    sodium chloride 125 mL/hr at 08/30/19 2227    norepinephrine 0.14 mcg/kg/min (08/31/19 0249)     Scheduled Medications    [START ON 9/1/2019] levofloxacin  750 mg Intravenous Q48H    amitriptyline  10 mg Oral Nightly    topiramate  50 mg Oral BID    venlafaxine  150 mg Oral Daily    propranolol  120 mg Oral Daily    sodium chloride flush  10 mL Intravenous 2 times per day    famotidine  20 mg Oral Daily    metroNIDAZOLE  500 mg Intravenous Q8H     PRN Meds:

## 2019-08-31 NOTE — CONSULTS
08/30/19  1645 08/31/19  0456   * 549*   ALT 46* 255*   BILIDIR  --  2.7*   BILITOT 1.3* 2.5*   ALKPHOS 122 178*     Recent Labs     08/30/19  1645   LIPASE 35.0     Recent Labs     08/30/19  1750   PROTIME 16.9*   INR 1.48*     No results for input(s): PTT in the last 72 hours. No results for input(s): OCCULTBLD in the last 72 hours. Assessment / Plan :    1. Gi: cholecystitis and dru dil on ct scan. Rec:  - iv antbx  - npo  - ivf  - follow lft  - surg eval for cholecystectomy with intraop cholangiogram vs IR perc drain gb; if IOC + choledocholithiasis with obstruction will be available for ercp. Alternatively could consider mrcp further eval cbd if desired per surg team.  - hold heparin timing decision per surg team    2. Resp: intubated, per pulm team.    3. CV: afib, mgmt per primary team. anticoag as above. 4. Id: leukocytosis/fever likely gb vs pneumonia etc, antbx per primary team.    Pt critically ill. Discussed with daughter and granddaughter at bedside.     Raúl Villa MD  [facility] and [address]

## 2019-08-31 NOTE — PLAN OF CARE
Nutrition Problem: Inadequate energy intake  Intervention: Food and/or Nutrient Delivery: Continue NPO  Nutritional Goals: Initiate nutrition support within the next 24 to 48 hours

## 2019-08-31 NOTE — CONSULTS
MD Usman Motley MD Chalmers Hun, MD                                  Office: [phone]                 Fax: [phone]          [employer]                     NEPHROLOGY ICU CONSULTATION NOTE:     PATIENT NAME: Gail Nogueira  : 1940  MRN: [id number]      Name:  Gail Nogueira Date/Time of Admission: 2019  4:15 PM    CSN: [id number] Attending Provider: Sade Harrington MD   Room/Bed: [facility]92/9548-02 : 1940 Age: 67 y.o. Reason for Nephrology consult :  Evaluation of patient with worsening renal failure. And decreased urine output            History of Presenting complaint:       Gail Nogueira 67 y.o. Has been admitted with multiple complaints of abdominal pain and is found to have acute cholecystitis. At the time of admission patient is found to have severe hypotension and has been started on pressor support. She is noted to have decreased urine output since admission and worsening renal failure. Nephrology consult for evaluation of acute kidney injury and decreased urine output. Patient has had fluid bolus with some improvement in blood pressure. She is intubated and on the ventilator as she went into respiratory arrest.  Patient to go for percutaneous drainage of the gallbladder. No previous history of chronic kidney disease. Medications reviewed. Pressors reviewed. Antibiotics reviewed. Patient had CT scan of the abdomen with contrast Within 24 hours    BUN is 23 and a creatinine of 1.3 with an INS calcium of 1.0. Magnesium is 1.6. Albumin is 3.0. WBC count 17.2        Medications reviewed. Medical records reviewed. Past Medical History :         Past Medical History:   Diagnosis Date    Arthritis     BACK    Asthma     Hypertension     Migraine        Medications  Which i have  Reviewed, also have reviewed home medications  Prior to Admission medications    Medication Sig Start Date End Date Taking? mg, 10 mg, Oral, Nightly  topiramate (TOPAMAX) tablet 50 mg, 50 mg, Oral, BID  venlafaxine (EFFEXOR XR) extended release capsule 150 mg, 150 mg, Oral, Daily  propranolol (INDERAL LA) extended release capsule 120 mg, 120 mg, Oral, Daily  sodium chloride flush 0.9 % injection 10 mL, 10 mL, Intravenous, 2 times per day  sodium chloride flush 0.9 % injection 10 mL, 10 mL, Intravenous, PRN  magnesium hydroxide (MILK OF MAGNESIA) 400 MG/5ML suspension 30 mL, 30 mL, Oral, Daily PRN  ondansetron (ZOFRAN) injection 4 mg, 4 mg, Intravenous, Q6H PRN  0.9 % sodium chloride infusion, , Intravenous, Continuous  famotidine (PEPCID) tablet 20 mg, 20 mg, Oral, Daily  acetaminophen (TYLENOL) tablet 650 mg, 650 mg, Oral, Q4H PRN  metronidazole (FLAGYL) 500 mg in NaCl 100 mL IVPB premix, 500 mg, Intravenous, Q8H  heparin (porcine) injection 5,340 Units, 80 Units/kg, Intravenous, PRN  heparin (porcine) injection 2,670 Units, 40 Units/kg, Intravenous, PRN  perflutren lipid microspheres (DEFINITY) injection 1.65 mg, 1.5 mL, Intravenous, ONCE PRN  ipratropium-albuterol (DUONEB) nebulizer solution 1 ampule, 1 ampule, Inhalation, Q4H PRN  norepinephrine (LEVOPHED) 16 mg in dextrose 5% 250 mL infusion, 2 mcg/min, Intravenous, Continuous   dexmedetomidine (PRECEDEX) IV infusion      fentaNYL (SUBLIMAZE) infusion 1.5 mcg/kg/hr (08/31/19 0359)    diltiazem (CARDIZEM) 125 mg in dextrose 5% 125 mL infusion 5 mg/hr (08/30/19 1955)    sodium chloride 125 mL/hr at 08/30/19 2227    norepinephrine 0.14 mcg/kg/min (08/31/19 0249)         Allergies.   Allergies   Allergen Reactions    Alendronate Sodium      Other reaction(s): jaw pain---concern 4 thad necro, jaw pain---concern 4 thad necro (mild to moderate)    Medroxyprogesterone Acetate      Other reaction(s): depressed mood, depressed mood (mild to moderate)    Acetaminophen     Formoterol Fumarate      Other reaction(s): dry mouth    Milk-Related Compounds     Mometasone Furoate

## 2019-09-01 NOTE — CONSULTS
[facility]                     [facility], [address]                                  CONSULTATION    PATIENT NAME: Jacquelyn Sharma                     :        1940  MED REC NO:   [id number]                          ROOM:       3912  ACCOUNT NO:   [de-identified]                           ADMIT DATE: 2019  PROVIDER:     Michael Radford MD    CONSULT DATE:  2019    REASON FOR CONSULTATION:  Evaluate patient, known to you with chronic  atrial fibrillation. HISTORY OF PRESENT ILLNESS:  The patient is a 78-year-old female who  presents to the emergency room after several days of recurrent nausea,  vomiting, and then severe abdominal pain that brought her to the  emergency room. Due to the intractable pain which was profound, the  patient received narcotics. She takes narcotics chronically for her  chronic pain. She then had a respiratory arrest.  The patient does have  chronic obstructive pulmonary disease. With this, she developed a  respiratory arrest.  She was intubated. She now remains intubated. Her  abdominal pain was evaluated, suggested that she had acute  cholecystitis. It has been found that she has gram negative bacteremia  due to her cholecystitis. She now has been managed on the ventilator. She had a percutaneous drainage of her gallbladder today to treat the  acute cholecystitis. She now appears comfortable, sedated on the  ventilator. As for the patient's cardiac history, she is known to have  normal left ventricular ejection fraction. She has chronic atrial  fibrillation. Attempts were made at cardioversion. She did cardiovert;  however, she returned to atrial fibrillation and since she was  relatively asymptomatic with her atrial fibrillation, she has pursued  rate control. Her daughter stays at home. She does ambulate to some  degree. She does have shortness of breath with activity.   Her  medications prior to

## 2019-09-01 NOTE — PROGRESS NOTES
[facility]        PATIENT NAME: Gail Nogueira     TODAY'S DATE: 9/1/2019    SUBJECTIVE:  CC cholecystitis  Pt remains in ICU level of care, on Vent, sedated, had agitation with wean  No emesis, no BM.      Current Medications:   Current Facility-Administered Medications: risperiDONE (RISPERDAL) tablet 0.25 mg, 0.25 mg, Oral, BID  haloperidol lactate (HALDOL) injection 3 mg, 3 mg, Intravenous, Q4H PRN  potassium chloride 20 mEq/50 mL IVPB (Central Line), 20 mEq, Intravenous, PRN  magnesium sulfate 1 g in dextrose 5% 100 mL IVPB, 1 g, Intravenous, PRN  levofloxacin (LEVAQUIN) 750 MG/150ML infusion 750 mg, 750 mg, Intravenous, Q48H  dexmedetomidine (PRECEDEX) 400 mcg in sodium chloride 0.9 % 100 mL infusion, 0.2 mcg/kg/hr, Intravenous, Continuous  heparin (porcine) injection 5,260 Units, 80 Units/kg, Intravenous, PRN  heparin (porcine) injection 2,630 Units, 40 Units/kg, Intravenous, PRN  heparin 25,000 units in dextrose 5% 250 mL infusion, 18 Units/kg/hr, Intravenous, Continuous  propofol injection, 10 mcg/kg/min, Intravenous, Titrated  morphine injection 4 mg, 4 mg, Intravenous, Q30 Min PRN  ondansetron (ZOFRAN) injection 4 mg, 4 mg, Intravenous, Q30 Min PRN  fentaNYL (SUBLIMAZE) 1,000 mcg in sodium chloride 0.9 % 100 mL infusion, 0.5 mcg/kg/hr, Intravenous, Continuous  diltiazem 125 mg in dextrose 5 % 125 mL infusion, 5 mg/hr, Intravenous, Continuous  amitriptyline (ELAVIL) tablet 10 mg, 10 mg, Oral, Nightly  topiramate (TOPAMAX) tablet 50 mg, 50 mg, Oral, BID  venlafaxine (EFFEXOR XR) extended release capsule 150 mg, 150 mg, Oral, Daily  propranolol (INDERAL LA) extended release capsule 120 mg, 120 mg, Oral, Daily  sodium chloride flush 0.9 % injection 10 mL, 10 mL, Intravenous, 2 times per day  sodium chloride flush 0.9 % injection 10 mL, 10 mL, Intravenous, PRN  magnesium hydroxide (MILK OF MAGNESIA) 400 MG/5ML suspension 30 mL, 30 mL, Oral, Daily PRN  ondansetron (ZOFRAN) injection 4 mg, 4 mg, Intravenous, Q6H PRN  0.9 % sodium chloride infusion, , Intravenous, Continuous  famotidine (PEPCID) tablet 20 mg, 20 mg, Oral, Daily  acetaminophen (TYLENOL) tablet 650 mg, 650 mg, Oral, Q4H PRN  metronidazole (FLAGYL) 500 mg in NaCl 100 mL IVPB premix, 500 mg, Intravenous, Q8H  perflutren lipid microspheres (DEFINITY) injection 1.65 mg, 1.5 mL, Intravenous, ONCE PRN  ipratropium-albuterol (DUONEB) nebulizer solution 1 ampule, 1 ampule, Inhalation, Q4H PRN  norepinephrine (LEVOPHED) 16 mg in dextrose 5% 250 mL infusion, 2 mcg/min, Intravenous, Continuous  Prior to Admission medications    Medication Sig Start Date End Date Taking? Authorizing Provider   amitriptyline (ELAVIL) 10 MG tablet Take 10 mg by mouth nightly   Yes Historical Provider, MD   warfarin (COUMADIN) 4 MG tablet TAKE 1 TABLET BY MOUTH EVERY DAY 12/5/18  Yes HALEY Lucas CNP   propranolol (INDERAL LA) 60 MG extended release capsule Take 60 mg by mouth every morning   Yes Historical Provider, MD   oxyCODONE (OXY-IR) 15 MG immediate release tablet Take 15 mg by mouth 3 times daily. .   Yes Historical Provider, MD   propranolol (INDERAL LA) 120 MG extended release capsule  6/1/17  Yes Historical Provider, MD   vitamin D (ERGOCALCIFEROL) 64935 UNITS CAPS capsule 50,000 Units once a week  2/2/16  Yes Historical Provider, MD   Tiotropium Bromide Monohydrate (SPIRIVA RESPIMAT) 2.5 MCG/ACT AERS Inhale 2 puffs into the lungs daily 9/8/15  Yes Leopoldo Rily, MD   venlafaxine (EFFEXOR-XR) 150 MG XR capsule Take 150 mg by mouth daily. XR   Yes Historical Provider, MD   albuterol (PROVENTIL HFA;VENTOLIN HFA) 108 (90 BASE) MCG/ACT inhaler Inhale 2 puffs into the lungs every 6 hours as needed. Yes Historical Provider, MD   topiramate (TOPAMAX) 50 MG tablet Take 50 mg by mouth 2 times daily. Yes Historical Provider, MD        Allergies:  Alendronate sodium; Medroxyprogesterone acetate;  Acetaminophen; Formoterol fumarate; Recent Labs     08/30/19  1750   INR 1.48*       Radiology Review:    EXAMINATION:   CT OF THE ABDOMEN AND PELVIS WITHOUT CONTRAST 8/31/2019 2:09 pm       TECHNIQUE:   CT of the abdomen and pelvis was performed without the administration of   intravenous contrast. Multiplanar reformatted images are provided for review. Dose modulation, iterative reconstruction, and/or weight based adjustment of   the mA/kV was utilized to reduce the radiation dose to as low as reasonably   achievable. COMPARISON:   08/30/2019       HISTORY:   ORDERING SYSTEM PROVIDED HISTORY: suspected bleeding in drain site area   TECHNOLOGIST PROVIDED HISTORY:   Additional Contrast?->None   Reason for Exam: suspected bleeding in drain site area   Acuity: Unknown   Type of Exam: Ongoing       FINDINGS:   Lower Chest: There are small bilateral pleural effusions with adjacent   collapsing consolidation within the lower lungs, increased when compared to   the previous exam.  Emphysema within the lower lungs again noncontrast   imaging of the base of the heart is unremarkable. Organs: Patient has undergone interval transhepatic cholecystostomy, with a   drain remaining in place. The gallbladder is now decompressed. There   continues to be foci of hyperdensity within the gallbladder, compatible with   the gallstones seen previously. No hematoma is detected. No abnormal fluid   is identified along the lateral extrahepatic course of the drain. No   significant edema is identified within the fat adjacent to the drain. Noncontrast imaging the remainder of the liver is otherwise unremarkable. Noncontrast imaging of the spleen and pancreas unremarkable. Bilateral   chronic adrenal calcifications again noted. Bilateral perinephric fat stranding is seen. Contrast is being excreted   within the renal collecting system from the contrast enhanced scan from   yesterday. GI/Bowel: Large bowel is unremarkable.   Appendix is mildly dilated, but   without periappendiceal fat stranding to suggest acute appendicitis. Nasogastric tube is present within the stomach. The stomach, duodenal sweep,   and the remainder of the small bowel are unremarkable. Pelvis: Small amount of free pelvic fluid is noted, increased when compared   to the previous exam.  Fuller catheter present within urinary bladder. Peritoneum/Retroperitoneum: There is increased retroperitoneal fat stranding,   along with stranding within the mesentery. Abdominal aorta unremarkable on   these noncontrast scan images. Bones/Soft Tissues: Mild diffuse anasarca noted with subcutaneous edema,   increased when compared to the previous exam.  Extra-abdominal soft tissues   otherwise unremarkable. Impression   Expected appearance of the cholecystostomy drain, with no significant   hematoma or edema along the extrahepatic course of the drain. The coil of   the drain is located within the gallbladder lumen, and the gallbladder is now   decompressed. Mildly hyperdense gallstones are again detected. Evidence of increasing anasarca, with increasing bilateral pleural effusions   with adjacent collapsing consolidation, increasing mesenteric and   retroperitoneal fat stranding, and with increasing subcutaneous fat stranding. XR CHEST 1 VW [[id number]] Collected: 09/01/19 0944   Updated: 09/01/19 1009    Narrative:     EXAMINATION:  ONE XRAY VIEW OF THE CHEST    9/1/2019 7:45 am    COMPARISON:  August 31, 2019. HISTORY:  ORDERING SYSTEM PROVIDED HISTORY: intubated  TECHNOLOGIST PROVIDED HISTORY:  Reason for exam:->intubated  Reason for Exam: Intubated  Acuity: Unknown  Type of Exam: Unknown    FINDINGS:  Tip of the endotracheal tube is above the julian.  Nasogastric tube tip  projects over the distal stomach.  Right-sided catheter is noted within the  right mid abdomen. Cardiac and mediastinal contours enlarged but unchanged.     Unchanged

## 2019-09-01 NOTE — PROGRESS NOTES
[facility] PROGRESS NOTE    9/1/2019 12:45 PM        Name: Elida Webb . Admitted: 8/30/2019  Primary Care Provider:  Karlos Goldberg (Tel: [phone])      Subjective: Patient remains intubated on pressors on heparin drip currently on sedatives daughter at the bedside        Reviewed interval ancillary notes    Current Medications    risperiDONE (RISPERDAL) tablet 0.25 mg BID   haloperidol lactate (HALDOL) injection 3 mg Q4H PRN   potassium chloride 20 mEq/50 mL IVPB (Central Line) PRN   magnesium sulfate 1 g in dextrose 5% 100 mL IVPB PRN   levofloxacin (LEVAQUIN) 750 MG/150ML infusion 750 mg Q48H   dexmedetomidine (PRECEDEX) 400 mcg in sodium chloride 0.9 % 100 mL infusion Continuous   heparin (porcine) injection 5,260 Units PRN   heparin (porcine) injection 2,630 Units PRN   heparin 25,000 units in dextrose 5% 250 mL infusion Continuous   propofol injection Titrated   morphine injection 4 mg Q30 Min PRN   ondansetron (ZOFRAN) injection 4 mg Q30 Min PRN   fentaNYL (SUBLIMAZE) 1,000 mcg in sodium chloride 0.9 % 100 mL infusion Continuous   diltiazem 125 mg in dextrose 5 % 125 mL infusion Continuous   amitriptyline (ELAVIL) tablet 10 mg Nightly   topiramate (TOPAMAX) tablet 50 mg BID   venlafaxine (EFFEXOR XR) extended release capsule 150 mg Daily   propranolol (INDERAL LA) extended release capsule 120 mg Daily   sodium chloride flush 0.9 % injection 10 mL 2 times per day   sodium chloride flush 0.9 % injection 10 mL PRN   magnesium hydroxide (MILK OF MAGNESIA) 400 MG/5ML suspension 30 mL Daily PRN   ondansetron (ZOFRAN) injection 4 mg Q6H PRN   0.9 % sodium chloride infusion Continuous   famotidine (PEPCID) tablet 20 mg Daily   acetaminophen (TYLENOL) tablet 650 mg Q4H PRN   metronidazole (FLAGYL) 500 mg in NaCl 100 mL IVPB premix Q8H   perflutren lipid microspheres (DEFINITY) injection 1.65 mg ONCE PRN detailed above. Enlarged cardiomediastinal silhouette with findings suggestive of pulmonary   interstitial edema. Correlation with volume status is recommended. CT ABDOMEN PELVIS WO CONTRAST Additional Contrast? None   Final Result   Expected appearance of the cholecystostomy drain, with no significant   hematoma or edema along the extrahepatic course of the drain. The coil of   the drain is located within the gallbladder lumen, and the gallbladder is now   decompressed. Mildly hyperdense gallstones are again detected. Evidence of increasing anasarca, with increasing bilateral pleural effusions   with adjacent collapsing consolidation, increasing mesenteric and   retroperitoneal fat stranding, and with increasing subcutaneous fat stranding. CT ABSCESS DRAINAGE W CATH PLACEMENT S&I   Final Result   Findings consistent with acute, calculous cholecystitis. Successful percutaneous cholecystostomy tube by transhepatic approach. XR CHEST PORTABLE   Final Result   Bilateral airspace disease, increased in the left retrocardiac region         XR CHEST PORTABLE   Final Result   Endotracheal tube is in the expected position. Nasogastric tube appears to be within the distal esophagus. That should be   advanced approximately 12 additional cm. Location of this was discussed with   Dr. Reed Oar at 8:20 p.m. on 08/30/2019. Pulmonary vascular congestion with patchy bilateral airspace disease,   concerning for pulmonary edema. XR CHEST PORTABLE   Final Result   Mild cardiomegaly. Patchy airspace opacities bilaterally, may be related to pulmonary edema   versus pneumonia. Mild left pleural effusion. CT ABDOMEN PELVIS W IV CONTRAST Additional Contrast? None   Final Result   Moderate motion degradation. The gallbladder is dilated, with cholelithiasis noted.   Common duct also is   dilated, which is new when compared to the previous exam.  There is

## 2019-09-01 NOTE — PROGRESS NOTES
Message sent to hospitalist regarding pt agitation with removing propofol, advised to consult with pulmonologist who removed propofol order. Message sent to pulmonology on call. Pt intubated for pulm edema vs pneumonia, recieved narcan from receiving too much opioids. Switched to precedex from propofol running at 60. Since propofol dc'd this evening, pt is on max precedex and fentanyl trying to sit up in bed and is pulling at restrains, coughing against vent, stacking breaths. Orders received, Propofol restarted, see MAR.

## 2019-09-01 NOTE — CONSULTS
Infectious Diseases Inpatient Consult Note      Reason for Consult:  Septic shock and E coli bacteremia    Requesting Physician:  ZORA    Primary Care Physician: [id number]    History Obtained From:   Medical records     CHIEF COMPLAINT:     Chief Complaint   Patient presents with    Abdominal Pain     pt arrived via EMS.  abdominal pain started yesterday. n/v. Septic shock and Bacteremia - resp failure now on the ventilator    HISTORY OF PRESENT ILLNESS:  67 y.o. woman admitted with abdominal, pain nausea, vomiting CT abd/pelvis with gallbladder distension and gall stones CBD dilatation concern for cholecystitis and Lfts cont to rise in hospital and she underwent IR cholecystostomy tube placement on  8/31 and Blood cx from admit positive for E coli and she now on the ventilator for resp failure and CXR with bi basal consolidation. WB elevated and T max 102.5 on admit and we are consulted for antibiotic recommendations for on going septic shock        Past Medical History:    Past Medical History:   Diagnosis Date    Arthritis     BACK    Asthma     Hypertension     Migraine        Past Surgical History:    Past Surgical History:   Procedure Laterality Date    ECTOPIC PREGNANCY SURGERY      FOOT SURGERY Right 4/9/13    HAMMERTOE CORRECTION 2ND TOE RIGHT FOOT    KIDNEY STONE SURGERY      TUMOR REMOVAL      fatty  arm       Current Medications:    Outpatient Medications Marked as Taking for the 8/30/19 encounter [facility] Encounter)   Medication Sig Dispense Refill    amitriptyline (ELAVIL) 10 MG tablet Take 10 mg by mouth nightly      warfarin (COUMADIN) 4 MG tablet TAKE 1 TABLET BY MOUTH EVERY DAY 90 tablet 0    propranolol (INDERAL LA) 60 MG extended release capsule Take 60 mg by mouth every morning      oxyCODONE (OXY-IR) 15 MG immediate release tablet Take 15 mg by mouth 3 times daily. Arielle Giang propranolol (INDERAL LA) 120 MG extended release capsule       vitamin D Pulmonary vascular congestion with patchy bilateral airspace disease,   concerning for pulmonary edema. XR CHEST PORTABLE   Final Result   Mild cardiomegaly. Patchy airspace opacities bilaterally, may be related to pulmonary edema   versus pneumonia. Mild left pleural effusion. CT ABDOMEN PELVIS W IV CONTRAST Additional Contrast? None   Final Result   Moderate motion degradation. The gallbladder is dilated, with cholelithiasis noted. Common duct also is   dilated, which is new when compared to the previous exam.  There is suspected   mild pericholecystic edema, but motion is present. All-in-all, cholecystitis   is a concern. Very small bilateral pleural effusions. Patulous fluid-filled distal esophagus. Correlate with clinical evidence of   reflux and possible esophagitis. Nonobstructing right-sided nephrolithiasis. Small inguinal hernia contains several short knuckles of small bowel, but   without evidence obstruction. IR CHOLECYSTOSTOMY PERCUTANEOUS COMPLETE    (Results Pending)   XR CHEST 1 VW    (Results Pending)       All pertinent images and reports for the current Hospitalization were reviewed by me. IMPRESSION:    Septic shock  E coli bacteremia  GI source  Cholecystitis with CBD dilatation   Gall stones  Lft elevation  Resp failure  S/p IR cholecystostomy tube placement   High fevers  WBC elevation     Given the on going shock and WBC elevation will broaden the coverage pending sensitivities once E coli sensitivities are back we can de escalate therapy - watch for complications and repeat Blood cx sent  She remains critically ill from shock and sepsis       Labs, Microbiology, Radiology and pertinent results from current hospitalization and care every where were reviewed by me as a part of the consultation. PLAN :  1. D/c Levofloxacin   2. IV Meropenem x 1 gm x Q 12 HRS   3. Cont IV Flagyl   4. Watch WBC   5. Repeat Blood cx   6.  Cont

## 2019-09-01 NOTE — PROGRESS NOTES
Oral Daily    sodium chloride flush  10 mL Intravenous 2 times per day    famotidine  20 mg Oral Daily    metroNIDAZOLE  500 mg Intravenous Q8H        DRIPS:   dexmedetomidine (PRECEDEX) IV infusion 1.4 mcg/kg/hr (09/01/19 8437)    heparin (porcine) 6 Units/kg/hr (09/01/19 0630)    propofol 25 mcg/kg/min (09/01/19 0941)    fentaNYL (SUBLIMAZE) infusion 1.5 mcg/kg/hr (09/01/19 0129)    diltiazem (CARDIZEM) 125 mg in dextrose 5% 125 mL infusion 15 mg/hr (09/01/19 0631)    sodium chloride 125 mL/hr at 09/01/19 0316    norepinephrine 0.2 mcg/kg/min (09/01/19 0316)       VITAL SIGNS:  Current Vital Signs  BP (!) 135/59   Pulse 86   Temp 98.7 °F (37.1 °C) (Core)   Resp 18   Ht 5' 2\" (1.575 m)   Wt 158 lb 4.6 oz (71.8 kg)   SpO2 96%   BMI 28.95 kg/m²   FiO2 : 50 %      PHYSICAL EXAM:  GENERAL: Intubated, sedated, endotracheal tube is in place  NECK: No JVD, trachea midline. CARDIOVASCULAR: S1 and S2 normal.  No murmurs, rubs, or gallops. CHEST: Symmetrical chest expansion, no accessory muscle use. LUNGS: Clear to auscultation bilaterally. No wheezes. No crackles. No rhonchi. ABDOMEN: Soft, nontender, nondistended, normal bowel sounds. EXTREMITIES: No clubbing. No cyanosis. No edema of lower extremities. SKIN: No rashes or ecchymoses, warm to touch. NEUROLOGIC: Nonfocal exam grossly. Alert and oriented x3      LABS:  Hematology  Recent Labs     09/01/19  0925   WBC 17.3*   HCT 31.1*        Recent Labs     08/30/19  1750   PROTIME 16.9*   INR 1.48*       Chemistries  Recent Labs     09/01/19  0500      K 4.4   *   CO2 19*   BUN 21*   CREATININE 1.0     Recent Labs     09/01/19  0500   CALCIUM 7.0*   MG 2.20   PHOS 3.4       LFTs  Recent Labs     09/01/19  0500   *   *   ALKPHOS 219*     Recent Labs     08/30/19  1645   LIPASE 35.0       Arterial Blood Gasses  No results for input(s): PH, PCO2, PO2 in the last 72 hours.     Invalid input(s): [id number],

## 2019-09-02 NOTE — PROGRESS NOTES
Nutrition Assessment    Type and Reason for Visit: Reassess    Nutrition Recommendations:   1. Continue NPO  2. Once pt hemodynamically stable RD recs: Vital HP @ 45ml/hr. Provides: 1080 enrike and 95 gms protein. Total EN vol 1080 ml. Free water per nephrology     Nutrition Assessment: Pt remains intubated and at risk for nutritional compromise. Pt now has a GB drain in place. Pt remains on diprivan at 21 ml/hr. 554 cals. Pt remains at risk for nutritional compromise d/t npo status/vent.      Malnutrition Assessment:  · Malnutrition Status: No malnutrition    Nutrition Risk Level: High    Nutrient Needs:  · Estimated Daily Total Kcal: 2945-1920 cals  · Estimated Daily Protein (g):  gms(1.2-2.0 gms)  · Estimated Daily Total Fluid (ml/day): 1ml/kcal    Nutrition Diagnosis:   · Problem: Inadequate energy intake  · Etiology: related to Insufficient energy/nutrient consumption     Signs and symptoms:  as evidenced by NPO status due to medical condition, Intubation    Objective Information:  · Nutrition-Focused Physical Findings: nonpitting edema  · Wound Type: None  · Current Nutrition Therapies:  · Oral Diet Orders: NPO   · Oral Diet intake: NPO  · Oral Nutrition Supplement (ONS) Orders: None  · ONS intake: NPO  · Anthropometric Measures:  · Ht: 5' 2\" (157.5 cm)   · Current Body Wt: 162 lb (73.5 kg)  · Ideal Body Wt: 110 lb (49.9 kg), % Ideal Body    · BMI Classification: BMI 25.0 - 29.9 Overweight    Nutrition Interventions:   Continue NPO  Continued Inpatient Monitoring, Education Not Indicated    Nutrition Evaluation:   · Evaluation: No progress toward goals   · Goals: Initiate nutrition support within the next 24 to 48 hours    · Monitoring: Nutrition Progression, TF Intake, TF Tolerance, Weight, Pertinent Labs, I&O, Monitor Hemodynamic Status      Electronically signed by Lazaro Etienne RD, CNSC, LD on 9/2/19 at 11:56 AM    Contact Number: [phone]

## 2019-09-02 NOTE — PROGRESS NOTES
infusion Stopped (09/01/19 1245)    norepinephrine 0.02 mcg/kg/min (09/02/19 1303)       Intake/Output Summary (Last 24 hours) at 9/2/2019 1310  Last data filed at 9/2/2019 1030  Gross per 24 hour   Intake 4906.87 ml   Output 1320 ml   Net 3586.87 ml       MEDICATIONS:  Scheduled Meds:   risperiDONE  0.25 mg Oral BID    meropenem  1,000 mg Intravenous Q12H    amitriptyline  10 mg Oral Nightly    topiramate  50 mg Oral BID    venlafaxine  150 mg Oral Daily    propranolol  120 mg Oral Daily    sodium chloride flush  10 mL Intravenous 2 times per day    famotidine  20 mg Oral Daily    metroNIDAZOLE  500 mg Intravenous Q8H     PRN Meds:  haloperidol lactate, potassium chloride, magnesium sulfate, heparin (porcine), heparin (porcine), morphine, ondansetron, sodium chloride flush, magnesium hydroxide, ondansetron, acetaminophen, perflutren lipid microspheres, ipratropium-albuterol      PHYSICAL EXAM:  Vital Signs: BP (!) 116/59   Pulse 79   Temp 97 °F (36.1 °C) (Core)   Resp 17   Ht 5' 2\" (1.575 m)   Wt 162 lb 11.2 oz (73.8 kg)   SpO2 97%   BMI 29.76 kg/m²      Gen: Intubated and on mechanical ventilation. ENT:   Endotracheal tube is in place. Resp:   No accessory muscle use. Clear lungs. CV:   PMI is normal. No murmur or gallop. GI:   Soft and not distended. No tenderness. Cholecystostomy drain in place. Neuro:  Sedated. No tremor.       LAB RESULTS:  CBC:   Recent Labs     08/31/19 2055 09/01/19  0500 09/01/19  0925 09/02/19  0500   WBC 19.8*  --  17.3* 13.8*   HGB 9.9*  --  9.8* 9.0*   HCT 31.1*  --  31.1* 28.3*   MCV 90.4  --  90.4 90.2    156 161 139     CHEMISTRY:   Recent Labs     08/31/19  0456 09/01/19  0500 09/02/19  0500    144 148*   K 4.3 4.4 3.9   * 114* 118*   CO2 22 19* 20*   BUN 23* 21* 18   CREATININE 1.3* 1.0 0.8   PHOS 2.9 3.4 2.2*   GLUCOSE 113* 105* 115*     LIVER PROFILE:   Recent Labs     08/30/19  1645 08/31/19  0456 09/01/19  0500 09/02/19  0500 * 549* 211* 89*   ALT 46* 255* 173* 103*   LIPASE 35.0  --   --   --    BILIDIR  --  2.7*  --   --    BILITOT 1.3* 2.5* 2.9* 1.9*   ALKPHOS 122 178* 219* 315*     ABG:   Recent Labs     09/01/19  0500 09/02/19  0635   PHART 7.261* 7.308*   OLA0LVO 40.5 35.7   PO2ART 105.0 86.0         CULTURES:  Blood Cultures 9/1/19:  No growth so far   Blood Culture 8/30/19:  E coli   Sputum Culture 9/1/19:  pending      CHEST XRAY 9/2/19:  Expiratory AP portable view of the chest has been obtained.  Endotracheal   tube remains in place with distal tip located approximately 3 cm above the   julian.  Nasogastric tube remains in place with distal tip located within the   distal aspect of the stomach.  Percutaneous cholecystostomy tube again   identified in the right upper quadrant.  Cardiopericardial silhouette is   enlarged but unchanged.  There is fullness/ill definition of pulmonary   vascularity.  There is increasing parenchymal disease at both lung bases. Density in the region of the cardiophrenic angles raises possibility of   underlying pleural effusions. ABDOMEN AND PELVIS CT SCAN 8/31/19:  Expected appearance of the cholecystostomy drain, with no significant   hematoma or edema along the extrahepatic course of the drain.  The coil of   the drain is located within the gallbladder lumen, and the gallbladder is now   decompressed.  Mildly hyperdense gallstones are again detected.       Evidence of increasing anasarca, with increasing bilateral pleural effusions   with adjacent collapsing consolidation, increasing mesenteric and   retroperitoneal fat stranding, and with increasing subcutaneous fat stranding. CRITICAL CARE TIME:  I was at the bedside or in the ICU for 35 minutes providing Critical Care to Cherri Whitemahi.

## 2019-09-02 NOTE — PLAN OF CARE
Thromboembolism:  Goal: Will show no signs or symptoms of venous thromboembolism  Description  Will show no signs or symptoms of venous thromboembolism  Outcome: Ongoing     Problem: Venous Thromboembolism:  Goal: Absence of signs or symptoms of impaired coagulation  Description  Absence of signs or symptoms of impaired coagulation  Outcome: Ongoing     Problem: MECHANICAL VENTILATION  Goal: Patient will maintain patent airway  Outcome: Ongoing     Problem: MECHANICAL VENTILATION  Goal: Oral health is maintained or improved  Outcome: Ongoing     Problem: MECHANICAL VENTILATION  Goal: ET tube will be managed safely  Outcome: Ongoing     Problem: MECHANICAL VENTILATION  Goal: Ability to express needs and understand communication  Outcome: Ongoing     Problem: Nutrition  Goal: Optimal nutrition therapy  9/2/2019 1018 by Janey Kidd  Outcome: Ongoing     Problem: Pain:  Goal: Pain level will decrease  Description  Pain level will decrease  Outcome: Ongoing     Problem: Pain:  Goal: Control of acute pain  Description  Control of acute pain  Outcome: Ongoing     Problem: Pain:  Goal: Control of chronic pain  Description  Control of chronic pain  Outcome: Ongoing

## 2019-09-02 NOTE — PROGRESS NOTES
MD Diana Vee MD Cricket Pear, MD                                  Office: [phone]                 Fax: [phone]          [facility]                     NEPHROLOGY ICU IN PATIENT PROGRESS NOTE:     PATIENT NAME: Юлия Snell  : 1940  MRN: [id number]            Subjective:        Sedated on vent. Improved hypotension. Intubated and on Ventilator. Urine output slow Improving  Afebrile. Medications reviewed. IV Fluids reviewed. IV Antibiotic doses reviewed. Fuller Catheter. Assessment and Plan    Assessment:     Acute kidney injury-Improving. Nonoliguric. Hypovolemia. Hypernatremia. Free water deficiency. Electrolyte imbalance  Metabolic acidosis. Annamary Ripa Possible septic shock-improved. Acute cholecystitis status post drain. Respiratory failure. Intubated on ventilator. Plan:       Renal function steady improvement. Most likely developed ATN due to sepsis and septic shocklike picture. Continued improvement in creatinine levels. Potassium levels are stable. Sodium level elevated. Decrease the IV fluids to 100 mils per hour. Change to D5W. Monitor glucose levels. Improved urine output. Creatinine levels are improving. Electrolytes multiple improving. Potassium levels have improved. Still noted to have metabolic acidosis. Continue hydration. No need for IV bicarbonate. Anemia levels are stable. Get a chest x-ray to check for fluid overload. Continue current IV fluids. Medications reviewed. Antibiotic dose reviewed and adjusted for renal function. Nephrotoxic medications discontinued           Multiple complex problems. and Patient is critically ill. and  Time spent 35 mins.           EXAM  Vitals:    19 1115   BP: 110/61   Pulse: 80   Resp: 17   Temp:    SpO2: 96%       Intake/Output Summary (Last 24 hours) at 2019 1141  Last data filed at 2019 1030  Gross per 24 hour   Intake fentaNYL (SUBLIMAZE) infusion 1.5 mcg/kg/hr (09/02/19 0518)    diltiazem (CARDIZEM) 125 mg in dextrose 5% 125 mL infusion Stopped (09/01/19 1245)    norepinephrine 0.03 mcg/kg/min (09/02/19 0915)       Data Review. Labs reviewed by me       CBC:   Recent Labs     08/31/19  2055 09/01/19  0500 09/01/19  0925 09/02/19  0500   WBC 19.8*  --  17.3* 13.8*   HGB 9.9*  --  9.8* 9.0*   HCT 31.1*  --  31.1* 28.3*   MCV 90.4  --  90.4 90.2    156 161 139     BMP:   Recent Labs     08/31/19  0456 09/01/19  0500 09/02/19  0500    144 148*   K 4.3 4.4 3.9   * 114* 118*   CO2 22 19* 20*   PHOS 2.9 3.4 2.2*   BUN 23* 21* 18   CREATININE 1.3* 1.0 0.8     Magnesium:   Lab Results   Component Value Date    MG 2.10 09/02/2019    MG 2.20 09/01/2019    MG 1.60 08/31/2019     Lab Results   Component Value Date    CREATININE 0.8 09/02/2019       Arterial Blood Gasses  No results for input(s): PH, PCO2, PO2 in the last 72 hours.     Invalid input(s): [id number], INSPIREDO2    UA:  Recent Labs     08/30/19  1655   COLORU YELLOW   PHUR 6.0   CLARITYU Clear   SPECGRAV 1.011   LEUKOCYTESUR Negative   UROBILINOGEN 0.2   BILIRUBINUR Negative   BLOODU Negative   GLUCOSEU Negative       LIVER PROFILE:   Recent Labs     08/30/19  1645 08/31/19  0456 09/01/19  0500 09/02/19  0500   * 549* 211* 89*   ALT 46* 255* 173* 103*   LIPASE 35.0  --   --   --    BILIDIR  --  2.7*  --   --    BILITOT 1.3* 2.5* 2.9* 1.9*   ALKPHOS 122 178* 219* 315*     PT/INR:    Lab Results   Component Value Date    PROTIME 16.9 08/30/2019    PROTIME 46.4 06/09/2019    PROTIME 29.2 09/06/2018    PROTIME 28.7 09/05/2018    INR 1.48 08/30/2019    INR 2.7 08/26/2019    INR 1.7 08/05/2019    INR 2.6 07/15/2019    INR 4.07 06/09/2019    INR 2.60 09/20/2018     PTT:    Lab Results   Component Value Date    APTT 37.5 09/02/2019    APTT 39.4 09/02/2019    APTT 58.2 09/01/2019     NETTA:  No results found for: ANATITER, NETTA  CHEMISTRY COMMON GROUP :   Lab

## 2019-09-02 NOTE — PROGRESS NOTES
Pulse 79   Temp 97 °F (36.1 °C) (Core)   Resp 17   Ht 5' 2\" (1.575 m)   Wt 162 lb 11.2 oz (73.8 kg)   SpO2 97%   BMI 29.76 kg/m²     General appearance: No apparent distress, appears stated age. On mechanical ventilation  HEENT: Pupils equal, round, and reactive to light. Conjunctivae/corneas clear. Neck: Supple, with full range of motion. No jugular venous distention. Trachea midline. Respiratory: On mechanical ventilation. Rhonchi, no wheezes or crackles  Cardiovascular: Regular rate and rhythm with normal S1/S2 without murmurs, rubs or gallops. Abdomen: Soft, non-tender, non-distended with normal bowel sounds. Musculoskeletal: No clubbing, cyanosis or edema bilaterally. Full range of motion without deformity. Skin: Skin color, texture, turgor normal.  No rashes or lesions. Neurologic:  Neurovascularly intact without any focal sensory/motor deficits.  Cranial nerves: II-XII intact, grossly non-focal.  Psychiatric: sedated, unresponsive  Capillary Refill: Brisk,< 3 seconds   Peripheral Pulses: +2 palpable, equal bilaterally       Labs:   Recent Labs     08/31/19 2055 09/01/19  0500 09/01/19  0925 09/02/19  0500   WBC 19.8*  --  17.3* 13.8*   HGB 9.9*  --  9.8* 9.0*   HCT 31.1*  --  31.1* 28.3*    156 161 139     Recent Labs     08/31/19  0456 09/01/19  0500 09/02/19  0500    144 148*   K 4.3 4.4 3.9   * 114* 118*   CO2 22 19* 20*   BUN 23* 21* 18   CREATININE 1.3* 1.0 0.8   CALCIUM 7.2* 7.0* 7.3*   PHOS 2.9 3.4 2.2*     Recent Labs     08/31/19  0456 09/01/19  0500 09/02/19  0500   * 211* 89*   * 173* 103*   BILIDIR 2.7*  --   --    BILITOT 2.5* 2.9* 1.9*   ALKPHOS 178* 219* 315*     Recent Labs     08/30/19  1750   INR 1.48*     Recent Labs     08/30/19  1645 08/30/19  2212 08/31/19  0221   TROPONINI <0.01 <0.01 <0.01       Urinalysis:      Lab Results   Component Value Date    NITRU Negative 08/30/2019    BLOODU Negative 08/30/2019    SPECGRAV 1.011 08/30/2019 pulmonary edema   versus pneumonia. Mild left pleural effusion. CT ABDOMEN PELVIS W IV CONTRAST Additional Contrast? None   Final Result   Moderate motion degradation. The gallbladder is dilated, with cholelithiasis noted. Common duct also is   dilated, which is new when compared to the previous exam.  There is suspected   mild pericholecystic edema, but motion is present. All-in-all, cholecystitis   is a concern. Very small bilateral pleural effusions. Patulous fluid-filled distal esophagus. Correlate with clinical evidence of   reflux and possible esophagitis. Nonobstructing right-sided nephrolithiasis. Small inguinal hernia contains several short knuckles of small bowel, but   without evidence obstruction. IR CHOLECYSTOSTOMY PERCUTANEOUS COMPLETE    (Results Pending)           Assessment/Plan:    Active Hospital Problems    Diagnosis    Acute abdomen [R10.0]    Septic shock due to Escherichia coli ([address] [A41.51, R65.21]    Alveolar pneumonopathy ([address] [J84.09]    Right upper quadrant abdominal pain [R10.11]    Acute respiratory failure with hypoxia ([facility]) [J96.01]    ARDS (adult respiratory distress syndrome) ([facility]) [J80]    Calculus of gallbladder with acute cholecystitis without obstruction [K80.00]    Sepsis ([address] [A41.9]    Essential hypertension [I10]    Atrial fibrillation ([facility]) [I48.91]    COPD (chronic obstructive pulmonary disease) ([address] [J44.9]    Hypertriglyceridemia [E78.1]       PLAN:    Severe sepsis, present on arrival  Caused by acute cholecystitis and gram negative bacteremia  Continue supportive treatment with fluids and pressors. Underlying condition being treated with antibiotics    Bacteremia, gram-negative  E Coli growth in blood cultures, 2/2 bottles  Seen by ID  Antibiotic treatment adjusted.  Currently on meropenem and metronidazole    Acute cholecystitis   Not stable enough for cholecystectomy, though this is

## 2019-09-02 NOTE — PROGRESS NOTES
Spoke with Daughter/POA Kelli Matinicus. Updated on patient status and POC, all questions answered. Kelli Matinicus requesting that patient information only be given out to her or her  [place]. Will pass on to day shift RN and update HUC.  Claude Flicker RN, BSN, 7:09 AM

## 2019-09-02 NOTE — PROGRESS NOTES
Appendix is mildly dilated, but   without periappendiceal fat stranding to suggest acute appendicitis. Nasogastric tube is present within the stomach. The stomach, duodenal sweep,   and the remainder of the small bowel are unremarkable. Pelvis: Small amount of free pelvic fluid is noted, increased when compared   to the previous exam.  Fuller catheter present within urinary bladder. Peritoneum/Retroperitoneum: There is increased retroperitoneal fat stranding,   along with stranding within the mesentery. Abdominal aorta unremarkable on   these noncontrast scan images. Bones/Soft Tissues: Mild diffuse anasarca noted with subcutaneous edema,   increased when compared to the previous exam.  Extra-abdominal soft tissues   otherwise unremarkable. Impression   Expected appearance of the cholecystostomy drain, with no significant   hematoma or edema along the extrahepatic course of the drain. The coil of   the drain is located within the gallbladder lumen, and the gallbladder is now   decompressed. Mildly hyperdense gallstones are again detected. Evidence of increasing anasarca, with increasing bilateral pleural effusions   with adjacent collapsing consolidation, increasing mesenteric and   retroperitoneal fat stranding, and with increasing subcutaneous fat stranding. XR CHEST 1 VW [[id number]] Collected: 09/01/19 0944   Updated: 09/01/19 1009    Narrative:     EXAMINATION:  ONE XRAY VIEW OF THE CHEST    9/1/2019 7:45 am    COMPARISON:  August 31, 2019. HISTORY:  ORDERING SYSTEM PROVIDED HISTORY: intubated  TECHNOLOGIST PROVIDED HISTORY:  Reason for exam:->intubated  Reason for Exam: Intubated  Acuity: Unknown  Type of Exam: Unknown    FINDINGS:  Tip of the endotracheal tube is above the julian.  Nasogastric tube tip  projects over the distal stomach.  Right-sided catheter is noted within the  right mid abdomen.     Cardiac and mediastinal contours enlarged but

## 2019-09-02 NOTE — PLAN OF CARE
Problem: Falls - Risk of:  Goal: Will remain free from falls  Description  Will remain free from falls  9/2/2019 0030 by Nitza Sheppard RN  Outcome: Ongoing  Note:   Patient is a high fall risk. Patient free of falls this shift. Bed low, locked and alarmed at all times. Call light and bedside table is within reach. Yellow blanket on bed, arm band on wrist and fall sign posted in the room. Problem: Restraint Use - Nonviolent/Non-Self-Destructive Behavior:  Goal: Absence of restraint indications  Description  Absence of restraint indications  9/2/2019 0030 by Nitza Sheppard RN  Outcome: Ongoing  Note:   Bilateral soft wrist restraints remain in place, see documentation. Problem: Risk for Impaired Skin Integrity  Goal: Tissue integrity - skin and mucous membranes  Description  Structural intactness and normal physiological function of skin and  mucous membranes. 9/2/2019 0030 by Nitza Sheppard RN  Outcome: Ongoing  Note:   Patient is at risk for impaired skin integrity. Repositioning patient every two hours. Skin is assessed every shift and prn. Barrier cream applied for incontinence as needed. Fuller in place. Mepilex to coccyx. Problem: Nutrition  Goal: Optimal nutrition therapy  9/2/2019 0030 by Nitza Sheppard RN  Outcome: Ongoing  Note:   Currently NPO. MANAN @ 69cm, clamped.

## 2019-09-03 NOTE — PROGRESS NOTES
me today. Immunization History   Administered Date(s) Administered    Influenza Virus Vaccine 10/15/2014    Influenza Whole 10/01/2012    Pneumococcal Conjugate 13-valent ([id number]) 02/05/2015    Pneumococcal Polysaccharide ([id number]) 10/15/2012       Family History: All family history was reviewed today. Problem Relation Age of Onset    Heart Disease Mother     Cancer Father         kidney       Objective:       PHYSICAL EXAM:      Vitals:   Vitals:    09/03/19 1200 09/03/19 1300 09/03/19 1301 09/03/19 1400   BP: 132/82 (!) 146/79 (!) 146/79    Pulse: 114 104 112 104   Resp: 11  15 12   Temp: 98.1 °F (36.7 °C)      TempSrc: Core      SpO2: 96%   92%   Weight:       Height:           Physical Exam       General: intubated and sedated, on ventilator   HEENT: normocephalic, atraumatic, sclera clear, pupils equal, light reflex preserved bilaterally, endotracheal tube in place  Cardiovascular: RRR, no murmurs/rubs/gallops detected  Pulmonary: CTABL, no rhonchi/rales  Abdomen/GI: Cholecystostomy tube in the right upper quadrant, serosanguineous drainage noted  Neuro: sedated, PERRL, moves all extremities when off sedation per RN  Skin: no skin tears or ulcers, no rash   Musculoskeletal:  No joint swelling, redness. No limitation of range of passive motion  Genitourinary: Fuller's catheter in place  Psych: could not assess  Lymphatic/Immunologic: No obvious bruising, no cervical lymphadenopathy    Lines: All vascular access sites are healthy with no local erythema, discharge or tenderness    Intake and output:    I/O last 3 completed shifts: In: 2601.2 [I.V.:2181. 2; NG/GT:120; IV Piggyback:300]  Out: 1 [Urine:900; Drains:125]    Lab Data:   All available labs and old records have been reviewed by me.     CBC:  Recent Labs     09/01/19  0925 09/02/19  0500 09/03/19  0515   WBC 17.3* 13.8* 8.7   RBC 3.44* 3.13* 3.04*   HGB 9.8* 9.0* 8.9*   HCT 31.1* 28.3* 27.3*    139 133*   MCV 90.4 90.2 89.9 MCH 28.6 28.7 29.2   MCHC 31.7 31.8 32.5   RDW 15.9* 16.1* 16.5*        BMP:  Recent Labs     09/01/19  0500 09/02/19  0500 09/03/19  0515    148* 144   K 4.4 3.9 3.2*   * 118* 115*   CO2 19* 20* 21   BUN 21* 18 11   CREATININE 1.0 0.8 0.8   CALCIUM 7.0* 7.3* 7.7*   GLUCOSE 105* 115* 119*        Hepatic Function Panel:   Lab Results   Component Value Date    ALKPHOS 145 09/03/2019    ALT 68 09/03/2019    AST 40 09/03/2019    PROT 4.6 09/03/2019    BILITOT 1.4 09/03/2019    BILIDIR 2.7 08/31/2019    IBILI -0.2 08/31/2019    LABALBU 2.0 09/03/2019       CPK: No results found for: CKTOTAL  ESR: No results found for: SEDRATE  CRP: No results found for: CRP        Imaging: All pertinent images and reports for the current visit were reviewed by me during this visit. XR CHEST PORTABLE   Final Result   Bilateral edema or pneumonia, similar to prior, with small bilateral pleural   effusions. Support apparatus as above. XR CHEST PORTABLE   Final Result   Findings suggestive of worsening pleural-parenchymal disease when compared to   the study of 9/1/2019 as described above. XR CHEST 1 VW   Final Result   1. Tip of the endotracheal tube is above the julian. Additional support   hardware, as detailed above. 2. Enlarged cardiomediastinal silhouette with findings suggestive of   pulmonary interstitial edema. Correlation with volume status is recommended. CT ABDOMEN PELVIS WO CONTRAST Additional Contrast? None   Final Result   Expected appearance of the cholecystostomy drain, with no significant   hematoma or edema along the extrahepatic course of the drain. The coil of   the drain is located within the gallbladder lumen, and the gallbladder is now   decompressed. Mildly hyperdense gallstones are again detected.       Evidence of increasing anasarca, with increasing bilateral pleural effusions   with adjacent collapsing consolidation, increasing mesenteric and dictation software. Although every effort was made to ensure the accuracy of this automated transcription, some errors in transcription may have occurred inadvertently. If you may need any clarification, please do not hesitate to contact me through EPIC or at the phone number provided below with my electronic signature. Any pictures or media included in this note were obtained after taking informed verbal consent from the patient and with their approval to include those in the patient's medical record.     Henry Ann MD, MPH  9/3/19, 4:05 PM   [facility] Infectious Disease   Office: [phone]  Fax: [phone]  Tuesday AM clinic:   [address] [place] [address] 120  Thursday AM [id number] [facility]

## 2019-09-03 NOTE — FLOWSHEET NOTE
, answered all FAMILY'S QUESTIONS questions. Provided with information on PICC care to review. PICC tip verified via 3CG (Ok to use). Reported off to patient's  Nurse NAN YOUSIF RN.

## 2019-09-03 NOTE — PROGRESS NOTES
Sepsis ([facility]    Right upper quadrant abdominal pain    Acute respiratory failure with hypoxia (HCC)    ARDS (adult respiratory distress syndrome) (HCC)    Calculus of gallbladder with acute cholecystitis without obstruction    Acute abdomen    Septic shock due to Escherichia coli ([facility]    Alveolar pneumonopathy (HCC)  Resolved Problems:    * No resolved hospital problems. *        Medications Reviewed by me   potassium phosphate IVPB  20 mmol Intravenous Once    propranolol  10 mg Oral TID    famotidine (PEPCID) injection  20 mg Intravenous BID    lidocaine 1 % injection  5 mL Intradermal Once    sodium chloride flush  10 mL Intravenous 2 times per day    digoxin  125 mcg Intravenous Daily    meropenem  1,000 mg Intravenous Q12H    topiramate  50 mg Oral BID    sodium chloride flush  10 mL Intravenous 2 times per day    metroNIDAZOLE  500 mg Intravenous Q8H      dextrose 100 mL/hr at 09/03/19 0847    heparin (porcine) 16 Units/kg/hr (09/03/19 0554)    propofol 50 mcg/kg/min (09/03/19 0847)    fentaNYL (SUBLIMAZE) infusion 1.5 mcg/kg/hr (09/02/19 2317)    norepinephrine Stopped (09/02/19 2259)       Data Review. Labs reviewed by me       CBC:   Recent Labs     09/01/19  0925 09/02/19  0500 09/03/19  0515   WBC 17.3* 13.8* 8.7   HGB 9.8* 9.0* 8.9*   HCT 31.1* 28.3* 27.3*   MCV 90.4 90.2 89.9    139 133*     BMP:   Recent Labs     09/01/19  0500 09/02/19  0500 09/03/19  0515    148* 144   K 4.4 3.9 3.2*   * 118* 115*   CO2 19* 20* 21   PHOS 3.4 2.2* 1.2*   BUN 21* 18 11   CREATININE 1.0 0.8 0.8     Magnesium:   Lab Results   Component Value Date    MG 1.80 09/03/2019    MG 2.10 09/02/2019    MG 2.20 09/01/2019     Lab Results   Component Value Date    CREATININE 0.8 09/03/2019       Arterial Blood Gasses  No results for input(s): PH, PCO2, PO2 in the last 72 hours.     Invalid input(s): Neha Jimenez    UA:  No results for input(s): NITRITE, [address], PHUR, LABCAST, [address], RBCUA, MUCUS, TRICHOMONAS, YEAST, BACTERIA, CLARITYU, SPECGRAV, LEUKOCYTESUR, UROBILINOGEN, BILIRUBINUR, BLOODU, GLUCOSEU, AMORPHOUS in the last 72 hours. Invalid input(s): Leonarda Spurdoug    LIVER PROFILE:   Recent Labs     09/01/19  0500 09/02/19  0500 09/03/19  0515   * 89* 40*   * 103* 68*   BILITOT 2.9* 1.9* 1.4*   ALKPHOS 219* 315* 145*     PT/INR:    Lab Results   Component Value Date    PROTIME 16.9 08/30/2019    PROTIME 46.4 06/09/2019    PROTIME 29.2 09/06/2018    PROTIME 28.7 09/05/2018    INR 1.48 08/30/2019    INR 2.7 08/26/2019    INR 1.7 08/05/2019    INR 2.6 07/15/2019    INR 4.07 06/09/2019    INR 2.60 09/20/2018     PTT:    Lab Results   Component Value Date    APTT 49.3 09/03/2019    APTT 40.1 09/02/2019    APTT 55.4 09/02/2019     NETTA:  No results found for: ANATITER, NETTA  CHEMISTRY COMMON GROUP :   Lab Results   Component Value Date    GLUCOSE 119 09/03/2019     Recent Labs     09/01/19  0500 09/02/19  0500 09/03/19  0515   GLUCOSE 105* 115* 119*   CALCIUM 7.0* 7.3* 7.7*         RADIOLOGY:        Imaging Results. Chest X Ray reviwed by me    Chest Xray Reviewed by me  Renal Ultrasound Reviewed by me    EKG reviewed by me.                 Electronically Signed: Alejandra Rubinstein, MD 9/3/2019 11:38 AM

## 2019-09-03 NOTE — PROGRESS NOTES
Tiotropium      Other reaction(s): dry mouth       Home Meds:  Prior to Visit Medications    Medication Sig Taking? Authorizing Provider   amitriptyline (ELAVIL) 10 MG tablet Take 10 mg by mouth nightly Yes Historical Provider, MD   warfarin (COUMADIN) 4 MG tablet TAKE 1 TABLET BY MOUTH EVERY DAY Yes HALEY Mcgregor CNP   propranolol (INDERAL LA) 60 MG extended release capsule Take 60 mg by mouth every morning Yes Historical Provider, MD   oxyCODONE (OXY-IR) 15 MG immediate release tablet Take 15 mg by mouth 3 times daily. . Yes Historical Provider, MD   propranolol (INDERAL LA) 120 MG extended release capsule  Yes Historical Provider, MD   vitamin D (ERGOCALCIFEROL) 22840 UNITS CAPS capsule 50,000 Units once a week  Yes Historical Provider, MD   Tiotropium Bromide Monohydrate (SPIRIVA RESPIMAT) 2.5 MCG/ACT AERS Inhale 2 puffs into the lungs daily Yes Kristen Dunn MD   venlafaxine (EFFEXOR-XR) 150 MG XR capsule Take 150 mg by mouth daily. XR Yes Historical Provider, MD   albuterol (PROVENTIL HFA;VENTOLIN HFA) 108 (90 BASE) MCG/ACT inhaler Inhale 2 puffs into the lungs every 6 hours as needed. Yes Historical Provider, MD   topiramate (TOPAMAX) 50 MG tablet Take 50 mg by mouth 2 times daily. Yes Historical Provider, MD        Past Medical History:  Past Medical History:   Diagnosis Date    Arthritis     BACK    Asthma     Hypertension     Migraine       Past Surgical History:    has a past surgical history that includes Ectopic pregnancy surgery; Kidney stone surgery; tumor removal; and Foot surgery (Right, 4/9/13). Social History:  Reviewed. reports that she has quit smoking. She has never used smokeless tobacco. She reports that she does not drink alcohol. Family History:  Reviewed. family history includes Cancer in her father; Heart Disease in her mother.      Review of System:  Unable to assess; sedated on vent    Physical Examination:  /55, , RR 16  Vitals:    09/03/19 0738   BP:

## 2019-09-03 NOTE — PROGRESS NOTES
fumarate; Milk-related compounds; Mometasone furoate; Penicillins; Procaine hcl; and Tiotropium    Social History:    reports that she has quit smoking. She has never used smokeless tobacco. She reports that she does not drink alcohol. Family History:        Problem Relation Age of Onset    Heart Disease Mother     Cancer Father         kidney       REVIEW OF SYSTEMS:  CONSTITUTIONAL:  negative  sedated  HEENT:  negative  RESPIRATORY:  negative  CARDIOVASCULAR:  negative  GASTROINTESTINAL:  negative   GENITOURINARY:  negative  HEMATOLOGIC/LYMPHATIC:  negative  NEUROLOGICAL:  negative  SKIN: negative      OBJECTIVE:  VITALS:  BP (!) 120/55   Pulse 106   Temp 98.5 °F (36.9 °C) (Core)   Resp 15   Ht 5' 2\" (1.575 m)   Wt 163 lb 2.3 oz (74 kg)   SpO2 100%   BMI 29.84 kg/m²     INTAKE/OUTPUT:    I/O last 3 completed shifts: In: 4447.6 [I.V.:3927.6; NG/GT:220; IV Piggyback:300]  Out: 1435 [Urine:1250; Drains:185]  No intake/output data recorded.     CONSTITUTIONAL:  sedated and on vent  LUNGS:  clear to auscultation  HEART: RRR  ABDOMEN:  normal bowel sounds, soft, non-distended, non-tender, helen tube in place, normal appearing bilious output   EXTR: no edema        Data:  CBC:   Recent Labs     09/01/19 0925 09/02/19  0500 09/03/19  0515   WBC 17.3* 13.8* 8.7   HGB 9.8* 9.0* 8.9*   HCT 31.1* 28.3* 27.3*    139 133*     BMP:    Recent Labs     09/01/19  0500 09/02/19  0500 09/03/19  0515    148* 144   K 4.4 3.9 3.2*   * 118* 115*   CO2 19* 20* 21   BUN 21* 18 11   CREATININE 1.0 0.8 0.8   GLUCOSE 105* 115* 119*     Hepatic:   Recent Labs     09/01/19 0500 09/02/19  0500 09/03/19  0515   * 89* 40*   * 103* 68*   BILITOT 2.9* 1.9* 1.4*   ALKPHOS 219* 315* 145*     Mag:      Recent Labs     09/01/19 0500 09/02/19  0500 09/03/19  0515   MG 2.20 2.10 1.80      Phos:     Recent Labs     09/01/19  0500 09/02/19  0500 09/03/19  0515   PHOS 3.4 2.2* 1.2*      INR:   No results for input(s): INR in the last 72 hours. Radiology Review:    EXAMINATION:   CT OF THE ABDOMEN AND PELVIS WITHOUT CONTRAST 8/31/2019 2:09 pm       TECHNIQUE:   CT of the abdomen and pelvis was performed without the administration of   intravenous contrast. Multiplanar reformatted images are provided for review. Dose modulation, iterative reconstruction, and/or weight based adjustment of   the mA/kV was utilized to reduce the radiation dose to as low as reasonably   achievable. COMPARISON:   08/30/2019       HISTORY:   ORDERING SYSTEM PROVIDED HISTORY: suspected bleeding in drain site area   TECHNOLOGIST PROVIDED HISTORY:   Additional Contrast?->None   Reason for Exam: suspected bleeding in drain site area   Acuity: Unknown   Type of Exam: Ongoing       FINDINGS:   Lower Chest: There are small bilateral pleural effusions with adjacent   collapsing consolidation within the lower lungs, increased when compared to   the previous exam.  Emphysema within the lower lungs again noncontrast   imaging of the base of the heart is unremarkable. Organs: Patient has undergone interval transhepatic cholecystostomy, with a   drain remaining in place. The gallbladder is now decompressed. There   continues to be foci of hyperdensity within the gallbladder, compatible with   the gallstones seen previously. No hematoma is detected. No abnormal fluid   is identified along the lateral extrahepatic course of the drain. No   significant edema is identified within the fat adjacent to the drain. Noncontrast imaging the remainder of the liver is otherwise unremarkable. Noncontrast imaging of the spleen and pancreas unremarkable. Bilateral   chronic adrenal calcifications again noted. Bilateral perinephric fat stranding is seen. Contrast is being excreted   within the renal collecting system from the contrast enhanced scan from   yesterday. GI/Bowel: Large bowel is unremarkable.   Appendix is mildly perihilar markings with prominence of interstitial lung markings. Trace bilateral pleural effusions.  No evidence of pneumothorax. No evidence of new osseous abnormalities. Impression:     Tip of the endotracheal tube is above the julian.  Additional support  hardware, as detailed above. Enlarged cardiomediastinal silhouette with findings suggestive of pulmonary  interstitial edema.  Correlation with volume status is recommended.        ASSESSMENT AND PLAN:  Acute cholecystitis  Possible choledocholithiasis and cholangitis  Sepsis  Continue IV antibiotics  Improved with perc cholecystostomy tube and antibiotics  LFT and WBC look good  Cont Vent and overall med support, wean vent as able   Begin diet after off vent  No urgent need for cholecystectomy      Chidi Handing

## 2019-09-03 NOTE — PROGRESS NOTES
Hospitalist Progress Note      PCP: Mandi Peterson    Date of Admission: 8/30/2019    Chief Complaint: confusion, shortness of breath     Hospital Course: admitted with severe sepsis. Acute cholecystitis and bacteremia diagnosed. Had cholecystostomy at IR. Developed AF / RVR. Can generate an occasional spontaneous respiration over the vent. Still becomes agitated with attempts to stop sedation. Off Precedex, on Propofol and fentanyl IV.    Bilirubin noted to be trending down    Subjective: ROS impossible; patient unresponsive, sedated, on mechanical ventilation       Medications:  Reviewed    Infusion Medications    dextrose 100 mL/hr at 09/03/19 0847    heparin (porcine) 16 Units/kg/hr (09/03/19 0554)    propofol 50 mcg/kg/min (09/03/19 0847)    fentaNYL (SUBLIMAZE) infusion 1.5 mcg/kg/hr (09/02/19 2317)    norepinephrine Stopped (09/02/19 2259)     Scheduled Medications    propranolol  10 mg Oral TID    famotidine (PEPCID) injection  20 mg Intravenous BID    lidocaine 1 % injection  5 mL Intradermal Once    sodium chloride flush  10 mL Intravenous 2 times per day    digoxin  125 mcg Intravenous Daily    meropenem  1,000 mg Intravenous Q12H    topiramate  50 mg Oral BID    sodium chloride flush  10 mL Intravenous 2 times per day    metroNIDAZOLE  500 mg Intravenous Q8H     PRN Meds: sodium chloride flush, haloperidol lactate, potassium chloride, magnesium sulfate, heparin (porcine), heparin (porcine), morphine, ondansetron, sodium chloride flush, magnesium hydroxide, ondansetron, acetaminophen, perflutren lipid microspheres, ipratropium-albuterol      Intake/Output Summary (Last 24 hours) at 9/3/2019 1525  Last data filed at 9/3/2019 0530  Gross per 24 hour   Intake 2601.2 ml   Output 1025 ml   Net 1576.2 ml       Physical Exam Performed:    BP (!) 146/79   Pulse 104   Temp 98.1 °F (36.7 °C) (Core)   Resp 12   Ht 5' 2\" (1.575 m)   Wt 163 lb 2.3 oz (74 kg)   SpO2 92%   BMI 29.84 kg/m²     General appearance: No apparent distress, appears stated age. On mechanical ventilation  HEENT: Pupils equal, round, and reactive to light. Conjunctivae/corneas clear. Neck: Supple, with full range of motion. No jugular venous distention. Trachea midline. Respiratory: On mechanical ventilation. Rhonchi, no wheezes or crackles  Cardiovascular: Regular rate and rhythm with normal S1/S2 without murmurs, rubs or gallops. Abdomen: Soft, non-tender, non-distended with normal bowel sounds. Musculoskeletal: No clubbing, cyanosis or edema bilaterally. Full range of motion without deformity. Skin: Skin color, texture, turgor normal.  No rashes or lesions. Neurologic:  Neurovascularly intact without any focal sensory/motor deficits. Cranial nerves: II-XII intact, grossly non-focal.  Psychiatric: sedated, unresponsive  Capillary Refill: Brisk,< 3 seconds   Peripheral Pulses: +2 palpable, equal bilaterally     I examined the patient today (09/03/19). Physical exam is same as yesterday (9/2). Labs:   Recent Labs     09/01/19 0925 09/02/19  0500 09/03/19  0515   WBC 17.3* 13.8* 8.7   HGB 9.8* 9.0* 8.9*   HCT 31.1* 28.3* 27.3*    139 133*     Recent Labs     09/01/19  0500 09/02/19  0500 09/03/19  0515    148* 144   K 4.4 3.9 3.2*   * 118* 115*   CO2 19* 20* 21   BUN 21* 18 11   CREATININE 1.0 0.8 0.8   CALCIUM 7.0* 7.3* 7.7*   PHOS 3.4 2.2* 1.2*     Recent Labs     09/01/19  0500 09/02/19  0500 09/03/19  0515   * 89* 40*   * 103* 68*   BILITOT 2.9* 1.9* 1.4*   ALKPHOS 219* 315* 145*     No results for input(s): INR in the last 72 hours. No results for input(s): Lynnell Pretty in the last 72 hours.     Urinalysis:      Lab Results   Component Value Date    NITRU Negative 08/30/2019    BLOODU Negative 08/30/2019    SPECGRAV 1.011 08/30/2019    GLUCOSEU Negative 08/30/2019       Radiology:  XR CHEST PORTABLE   Final Result   Bilateral edema or pneumonia, similar to prior, with small bilateral pleural   effusions. Support apparatus as above. XR CHEST PORTABLE   Final Result   Findings suggestive of worsening pleural-parenchymal disease when compared to   the study of 9/1/2019 as described above. XR CHEST 1 VW   Final Result   1. Tip of the endotracheal tube is above the julian. Additional support   hardware, as detailed above. 2. Enlarged cardiomediastinal silhouette with findings suggestive of   pulmonary interstitial edema. Correlation with volume status is recommended. CT ABDOMEN PELVIS WO CONTRAST Additional Contrast? None   Final Result   Expected appearance of the cholecystostomy drain, with no significant   hematoma or edema along the extrahepatic course of the drain. The coil of   the drain is located within the gallbladder lumen, and the gallbladder is now   decompressed. Mildly hyperdense gallstones are again detected. Evidence of increasing anasarca, with increasing bilateral pleural effusions   with adjacent collapsing consolidation, increasing mesenteric and   retroperitoneal fat stranding, and with increasing subcutaneous fat stranding. CT ABSCESS DRAINAGE W CATH PLACEMENT S&I   Final Result   Findings consistent with acute, calculous cholecystitis. Successful percutaneous cholecystostomy tube by transhepatic approach. XR CHEST PORTABLE   Final Result   Bilateral airspace disease, increased in the left retrocardiac region         XR CHEST PORTABLE   Final Result   Endotracheal tube is in the expected position. Nasogastric tube appears to be within the distal esophagus. That should be   advanced approximately 12 additional cm. Location of this was discussed with   Dr. Nadya Ewing at 8:20 p.m. on 08/30/2019. Pulmonary vascular congestion with patchy bilateral airspace disease,   concerning for pulmonary edema. XR CHEST PORTABLE   Final Result   Mild cardiomegaly.       Patchy airspace

## 2019-09-04 NOTE — PROGRESS NOTES
Nutrition Assessment    Type and Reason for Visit: Reassess    Nutrition Recommendations:   1. Recommend EN support to start when OK with surgery. 2. Recommend Osmolite 1.2 (standard formula without fiber) at initial goal rate 30 mL per hour to provide 720 mL total volume, 864 calories, 40 grams protein, 590 mL free water. Rate is limited by calories from propofol and D5. 3. Recommend water flush 30 mL q 6 hours for tube maintenance. 4. Recommend 1 Bottle Proteinex P2Go daily via syringe. Flush with 30 mL H20 before and after. Proteinex P2Go should not be mixed directly with the tube feeding formula. 5. Ensure head of bed is 30 - 45 degrees during continuous or bolus gastric feeding and for one hour after bolus. Turn off the feeding if head of bed is lowered less than 30 degrees. 6. Monitor for tolerance (residuals greater than 500 mL, bowel habits, N/V, cramping). Nutrition Assessment: Pt remains intubated and sedated. Propofol infusing at 21.5 mL per hour to provide 568 calories from fat daily. D5 infusing at 100 mL per hour to provide 408 calories daily. Pt's nutrition status is declining as evidenced by NPO status x 5 days during admission. Continue to recommend EN support to start when OK with surgery. Pt will be at risk for further nutrition compromise pending extubation with diet advancement and PO intake at least 50% of meals vs EN support to start. Malnutrition Assessment:  · Malnutrition Status: At risk for malnutrition  · Context: Acute illness or injury  · Findings of the 6 clinical characteristics of malnutrition (Minimum of 2 out of 6 clinical characteristics is required to make the diagnosis of moderate or severe Protein Calorie Malnutrition based on AND/ASPEN Guidelines):  1. Energy Intake-Less than or equal to 50% of estimated energy requirement, Greater than or equal to 5 days    2. Weight Loss-No significant weight loss,    3.  Fat Loss-No significant subcutaneous fat loss, 4. Muscle Loss-No significant muscle mass loss,    5. Fluid Accumulation-Moderate to severe fluid accumulation, Extremities  6.  Strength-Not measured    Nutrition Risk Level: High    Nutrient Needs:  · Estimated Daily Total Kcal: 2664-2731  · Estimated Daily Protein (g):  grams   · Estimated Daily Total Fluid (ml/day): 1 mL per kcal     Nutrition Diagnosis:   · Problem: Inadequate oral intake  · Etiology: related to Impaired respiratory function-inability to consume food     Signs and symptoms:  as evidenced by Intubation    Objective Information:  · Nutrition-Focused Physical Findings: +1 pitting generalized edema, +2 pitting BUE edema. -1.2 liters.  Phos 2.0.   · Wound Type: None  · Current Nutrition Therapies:  · Oral Diet Orders: NPO   · Oral Diet intake: NPO  · Oral Nutrition Supplement (ONS) Orders: None  · ONS intake: NPO  · Anthropometric Measures:  · Ht: 5' 2\" (157.5 cm)   · Current Body Wt: 164 lb (74.4 kg)  · Admission Body Wt: 147 lb (66.7 kg)  · Ideal Body Wt: 110 lb (49.9 kg)   · BMI Classification: BMI 30.0 - 34.9 Obese Class I    Nutrition Interventions:   Start Tube Feeding  Continued Inpatient Monitoring, Education Not Indicated    Nutrition Evaluation:   · Evaluation: No progress toward goals   · Goals: Initiate nutrition support within the next 24 to 48 hours    · Monitoring: Nutrition Progression, Weight, Pertinent Labs      Electronically signed by Nelia Parr RD, LD on 9/4/19 at 10:14 AM    Contact Number: [phone]

## 2019-09-04 NOTE — PROGRESS NOTES
PORTABLE   Final Result   Findings suggestive of worsening pleural-parenchymal disease when compared to   the study of 9/1/2019 as described above. XR CHEST 1 VW   Final Result   1. Tip of the endotracheal tube is above the julian. Additional support   hardware, as detailed above. 2. Enlarged cardiomediastinal silhouette with findings suggestive of   pulmonary interstitial edema. Correlation with volume status is recommended. CT ABDOMEN PELVIS WO CONTRAST Additional Contrast? None   Final Result   Expected appearance of the cholecystostomy drain, with no significant   hematoma or edema along the extrahepatic course of the drain. The coil of   the drain is located within the gallbladder lumen, and the gallbladder is now   decompressed. Mildly hyperdense gallstones are again detected. Evidence of increasing anasarca, with increasing bilateral pleural effusions   with adjacent collapsing consolidation, increasing mesenteric and   retroperitoneal fat stranding, and with increasing subcutaneous fat stranding. CT ABSCESS DRAINAGE W CATH PLACEMENT S&I   Final Result   Findings consistent with acute, calculous cholecystitis. Successful percutaneous cholecystostomy tube by transhepatic approach. XR CHEST PORTABLE   Final Result   Bilateral airspace disease, increased in the left retrocardiac region         XR CHEST PORTABLE   Final Result   Endotracheal tube is in the expected position. Nasogastric tube appears to be within the distal esophagus. That should be   advanced approximately 12 additional cm. Location of this was discussed with   Dr. John Gorman at 8:20 p.m. on 08/30/2019. Pulmonary vascular congestion with patchy bilateral airspace disease,   concerning for pulmonary edema. XR CHEST PORTABLE   Final Result   Mild cardiomegaly. Patchy airspace opacities bilaterally, may be related to pulmonary edema   versus pneumonia.       Mild left pleural effusion. CT ABDOMEN PELVIS W IV CONTRAST Additional Contrast? None   Final Result   Moderate motion degradation. The gallbladder is dilated, with cholelithiasis noted. Common duct also is   dilated, which is new when compared to the previous exam.  There is suspected   mild pericholecystic edema, but motion is present. All-in-all, cholecystitis   is a concern. Very small bilateral pleural effusions. Patulous fluid-filled distal esophagus. Correlate with clinical evidence of   reflux and possible esophagitis. Nonobstructing right-sided nephrolithiasis. Small inguinal hernia contains several short knuckles of small bowel, but   without evidence obstruction.                  Assessment/Plan:    Active Hospital Problems    Diagnosis    E coli bacteremia [R78.81]    Cholecystostomy care ([facility] [address] [Z43.4]    Endotracheally intubated [Z97.8]    On mechanically assisted ventilation (HCC) [Z99.11]    Overweight [E66.3]    Chronic atrial fibrillation ([facility]) [I48.2]    Permanent atrial fibrillation ([facility]) [I48.2]    Acute abdomen [R10.0]    Septic shock due to Escherichia coli ([address] [A41.51, R65.21]    Alveolar pneumonopathy ([address] [J84.09]    Right upper quadrant abdominal pain [R10.11]    Acute respiratory failure with hypoxia ([facility]) [J96.01]    ARDS (adult respiratory distress syndrome) ([address] [J80]    Acute cholecystitis [K81.0]    Sepsis ([address] [A41.9]    Essential hypertension [I10]    Atrial fibrillation with rapid ventricular response ([facility]) [I48.91]    COPD (chronic obstructive pulmonary disease) ([facility]) [J44.9]    Hypertriglyceridemia [E78.1]       PLAN:    Severe sepsis, present on arrival  Caused by acute cholecystitis and E Coli bacteremia  Continue supportive treatment with fluids and vent  Underlying condition being treated with antibiotics  Appears to be improving    Bacteremia, E Coli  E Coli growth in blood cultures, 2/2 bottles secondary to acute cholecystitis  Seen

## 2019-09-04 NOTE — PROGRESS NOTES
 norepinephrine Stopped (09/02/19 4573)       Intake/Output Summary (Last 24 hours) at 9/4/2019 0918  Last data filed at 9/4/2019 0530  Gross per 24 hour   Intake 4329.9 ml   Output 5510 ml   Net -1180.1 ml       MEDICATIONS:  Scheduled Meds:   magnesium sulfate  2 g Intravenous Once    potassium phosphate IVPB  20 mmol Intravenous Once    propranolol  10 mg Oral TID    ciprofloxacin  400 mg Intravenous Q12H    famotidine (PEPCID) injection  20 mg Intravenous BID    lidocaine 1 % injection  5 mL Intradermal Once    sodium chloride flush  10 mL Intravenous 2 times per day    digoxin  125 mcg Intravenous Daily    topiramate  50 mg Oral BID    sodium chloride flush  10 mL Intravenous 2 times per day    metroNIDAZOLE  500 mg Intravenous Q8H     PRN Meds:  sodium chloride flush, haloperidol lactate, potassium chloride, magnesium sulfate, heparin (porcine), heparin (porcine), morphine, ondansetron, sodium chloride flush, magnesium hydroxide, ondansetron, acetaminophen, perflutren lipid microspheres, ipratropium-albuterol      PHYSICAL EXAM:  Vital Signs: /63   Pulse 98   Temp 98.8 °F (37.1 °C) (Core)   Resp 18   Ht 5' 2\" (1.575 m)   Wt 164 lb 10.9 oz (74.7 kg)   SpO2 100%   BMI 30.12 kg/m²      Gen: Intubated and on mechanical ventilation. ENT:   Endotracheal tube is in place. Resp:   No accessory muscle use. Clear lungs. CV:   PMI is normal. No murmur or gallop. GI:   Soft and not distended. No tenderness. Cholecystostomy drain in place. Neuro:  Sedated. No tremor.       LAB RESULTS:  CBC:   Recent Labs     09/02/19  0500 09/03/19  0515 09/04/19  0500   WBC 13.8* 8.7 6.0   HGB 9.0* 8.9* 9.5*   HCT 28.3* 27.3* 29.4*   MCV 90.2 89.9 89.1    133* 131*     CHEMISTRY:   Recent Labs     09/02/19  0500 09/03/19  0515 09/04/19  0500   * 144 146*   K 3.9 3.2* 3.6   * 115* 116*   CO2 20* 21 23   BUN 18 11 7   CREATININE 0.8 0.8 0.7   PHOS 2.2* 1.2* 2.0*   GLUCOSE 115* 119* 116*     LIVER PROFILE:   Recent Labs     09/02/19  0500 09/03/19  0515 09/04/19  0500   AST 89* 40* 35   * 68* 53*   BILITOT 1.9* 1.4* 1.2*   ALKPHOS 315* 145* 128     ABG:   Recent Labs     09/03/19  0525 09/03/19  1110   PHART 7.307* 7.282*   [id number] 41.8 44.1   PO2ART 101.0 118.0*         CULTURES:  Blood Cultures 9/1/19:  No growth so far   Blood Culture 8/30/19:  E coli   Sputum Culture 9/1/19:  pending      CHEST XRAY PORTABLE:   XR CHEST PORTABLE    Narrative EXAMINATION:  ONE XRAY VIEW OF THE CHEST    9/3/2019 3:04 pm    COMPARISON:  09/02/2019    HISTORY:  ORDERING SYSTEM PROVIDED HISTORY: PICC Placement - tip verification  TECHNOLOGIST PROVIDED HISTORY:  Reason for exam:->PICC Placement - tip verification  Reason for exam:->  Reason for Exam: PICC Placement - tip verification - LEFT - A FIB  Acuity: Acute  Type of Exam: Ongoing    FINDINGS:  Nasogastric tube extends to the upper abdomen. Endotracheal tube is  approximately 3 cm proximal to the julian. Heterogeneous bilateral pulmonary  opacity is again demonstrated. Hazy opacity at the lung bases, not  significantly changed. No pneumothorax. Cardiac and mediastinal silhouettes  are unchanged. Calcification of aorta. Right upper quadrant catheter is  again demonstrated. Left upper extremity PICC line extends to the expected  location of superior vena cava. Impression Bilateral edema or pneumonia, similar to prior, with small bilateral pleural  effusions. Support apparatus as above.             ABDOMEN AND PELVIS CT SCAN 8/31/19:  Expected appearance of the cholecystostomy drain, with no significant   hematoma or edema along the extrahepatic course of the drain.  The coil of   the drain is located within the gallbladder lumen, and the gallbladder is now   decompressed.  Mildly hyperdense gallstones are again detected.       Evidence of increasing anasarca, with increasing bilateral pleural effusions   with adjacent collapsing Primary Defect Length In Cm (Final Defect Size - Required For Flaps/Grafts): 2

## 2019-09-04 NOTE — PROGRESS NOTES
Conuctivae pale   · Neck: Neck supple. · Cardiovascular: Tachycardic, irregular rhythm  regular rhythm, S1&S2. · Pulmonary/Chest: Bilateral coarse sounds. · Abdominal: soft. · Musculoskeletal: No edema    · Neurological: Sedated and intubated. Labs, diagnostic and imaging results reviewed. Reviewed. Recent Labs     09/02/19  0500 09/03/19  0515 09/04/19  0500   * 144 146*   K 3.9 3.2* 3.6   * 115* 116*   CO2 20* 21 23   PHOS 2.2* 1.2* 2.0*   BUN 18 11 7   CREATININE 0.8 0.8 0.7     Recent Labs     09/02/19  0500 09/03/19  0515 09/04/19  0500   WBC 13.8* 8.7 6.0   HGB 9.0* 8.9* 9.5*   HCT 28.3* 27.3* 29.4*   MCV 90.2 89.9 89.1    133* 131*     Lab Results   Component Value Date    TROPONINI <0.01 08/31/2019     Estimated Creatinine Clearance: 63 mL/min (based on SCr of 0.7 mg/dL).    No results found for: BNP  Lab Results   Component Value Date    PROTIME 16.9 08/30/2019    PROTIME 46.4 06/09/2019    PROTIME 29.2 09/06/2018    PROTIME 28.7 09/05/2018    INR 1.48 08/30/2019    INR 2.7 08/26/2019    INR 1.7 08/05/2019    INR 2.6 07/15/2019    INR 4.07 06/09/2019    INR 2.60 09/20/2018     No results found for: CHOL, HDL, TRIG    Scheduled Meds:   propranolol  10 mg Oral TID    ciprofloxacin  400 mg Intravenous Q12H    famotidine (PEPCID) injection  20 mg Intravenous BID    lidocaine 1 % injection  5 mL Intradermal Once    sodium chloride flush  10 mL Intravenous 2 times per day    digoxin  125 mcg Intravenous Daily    topiramate  50 mg Oral BID    sodium chloride flush  10 mL Intravenous 2 times per day    metroNIDAZOLE  500 mg Intravenous Q8H     Continuous Infusions:   dextrose 100 mL/hr at 09/04/19 0957    heparin (porcine) 16 Units/kg/hr (09/04/19 0841)    propofol 50 mcg/kg/min (09/04/19 1400)    fentaNYL (SUBLIMAZE) infusion 1 mcg/kg/hr (09/04/19 0840)    norepinephrine Stopped (09/02/19 9400)     PRN Meds:sodium chloride flush, haloperidol lactate, potassium Every effort was made to ensure accuracy, however, inadvertent computerized transcription errors may be present.      AMADO [facility], MD, MPH  [facility]   Office: [phone]

## 2019-09-04 NOTE — PROGRESS NOTES
[facility]        Progress Note    Patient Name: Jacqui Montes  MRN: [id number]  YOB: 1940  Date of Evaluation: 2019    Chief Complaint: Abdominal pain    Subjective:  No acute events overnight  No signs of acute distress, vitals stable  Remains intubated/sedated      Vital Signs:  Patient Vitals for the past 24 hrs:   BP Temp Temp src Pulse Resp SpO2 Weight   19 1200 -- 98.6 °F (37 °C) CORE -- -- -- --   19 1154 -- -- -- 88 -- 100 % --   19 0805 -- -- -- -- -- 100 % --   19 0800 130/63 98.8 °F (37.1 °C) CORE 98 18 100 % --   19 0700 (!) 136/56 -- -- 93 16 -- --   19 0600 134/67 -- -- 95 17 99 % --   19 0500 (!) 150/64 -- -- 89 17 -- --   19 0400 -- 99.1 °F (37.3 °C) CORE -- -- 100 % 164 lb 10.9 oz (74.7 kg)   19 0351 -- -- -- 89 17 100 % --   19 0300 123/60 -- -- 90 18 -- --   19 0200 130/62 -- -- 88 17 -- --   19 0100 137/76 -- -- 92 17 -- --   19 0009 -- -- -- 86 17 100 % --   19 0000 126/60 98.8 °F (37.1 °C) CORE 88 17 100 % --   19 2300 133/65 -- -- 86 19 -- --   19 2200 133/67 -- -- 93 19 -- --   19 2100 (!) 149/73 -- -- 91 17 -- --   19 2040 -- -- -- 91 16 100 % --   19 2000 128/60 99.1 °F (37.3 °C) CORE 87 13 100 % --   19 1800 123/69 -- -- 90 8 -- --   19 1700 -- -- -- 117 (!) 5 -- --   19 1652 -- -- -- -- (!) 6 -- --   19 1600 136/74 98.9 °F (37.2 °C) CORE 109 12 95 % --   19 1500 -- -- -- 105 13 -- --   19 1400 -- -- -- 104 12 92 % --      TEMPERATURE HISTORY 24H: Temp (24hrs), Av.9 °F (37.2 °C), Min:98.6 °F (37 °C), Max:99.1 °F (37.3 °C)    BLOOD PRESSURE HISTORY: Systolic (10YEZ), NAY:659 , Min:104 , BNC:539    Diastolic (59ZVF), ANA:91, Min:52, Max:82      Intake/Output:  I/O last 3 completed shifts:   In: 4329.9 [I.V.:3696.3; NG/GT:120; IV Piggyback:513.6]  Out: 5510 [Urine:5150; unremarkable. Bilateral chronic adrenal calcifications again noted. Bilateral perinephric fat stranding is seen. Contrast is being excreted within the renal collecting system from the contrast enhanced scan from yesterday. GI/Bowel: Large bowel is unremarkable. Appendix is mildly dilated, but without periappendiceal fat stranding to suggest acute appendicitis. Nasogastric tube is present within the stomach. The stomach, duodenal sweep, and the remainder of the small bowel are unremarkable. Pelvis: Small amount of free pelvic fluid is noted, increased when compared to the previous exam.  Fuller catheter present within urinary bladder. Peritoneum/Retroperitoneum: There is increased retroperitoneal fat stranding, along with stranding within the mesentery. Abdominal aorta unremarkable on these noncontrast scan images. Bones/Soft Tissues: Mild diffuse anasarca noted with subcutaneous edema, increased when compared to the previous exam.  Extra-abdominal soft tissues otherwise unremarkable. Expected appearance of the cholecystostomy drain, with no significant hematoma or edema along the extrahepatic course of the drain. The coil of the drain is located within the gallbladder lumen, and the gallbladder is now decompressed. Mildly hyperdense gallstones are again detected. Evidence of increasing anasarca, with increasing bilateral pleural effusions with adjacent collapsing consolidation, increasing mesenteric and retroperitoneal fat stranding, and with increasing subcutaneous fat stranding.      Ct Abscess Drainage W Cath Placement S&i    Result Date: 8/31/2019  PROCEDURE: CT GUIDED PERCUTANEOUS CHOLECYSTOSTOMY DRAINAGE 8/31/2019 HISTORY: ORDERING SYSTEM PROVIDED HISTORY: cholecystis TECHNOLOGIST PROVIDED HISTORY: Reason for exam:->cholecystis Reason for Exam: cholecystis Acuity: Unknown Type of Exam: Ongoing TECHNIQUE: Dose modulation, iterative reconstruction, and/or weight based adjustment of the mA/kV was

## 2019-09-04 NOTE — PLAN OF CARE
Nutrition Problem: Inadequate oral intake  Intervention: Food and/or Nutrient Delivery: Start Tube Feeding  Nutritional Goals: Initiate nutrition support within the next 24 to 48 hours

## 2019-09-05 NOTE — PROGRESS NOTES
09/03/19  0515 09/04/19  0500 09/05/19  0558   AST 40* 35 42*   ALT 68* 53* 44*   BILITOT 1.4* 1.2* 1.0   ALKPHOS 145* 128 138*     PT/INR:    Lab Results   Component Value Date    PROTIME 16.9 08/30/2019    PROTIME 46.4 06/09/2019    PROTIME 29.2 09/06/2018    PROTIME 28.7 09/05/2018    INR 1.48 08/30/2019    INR 2.7 08/26/2019    INR 1.7 08/05/2019    INR 2.6 07/15/2019    INR 4.07 06/09/2019    INR 2.60 09/20/2018     PTT:    Lab Results   Component Value Date    APTT 47.4 09/05/2019    APTT 241.4 09/05/2019    APTT 57.7 09/04/2019     NETTA:  No results found for: ANATITER, NETTA  CHEMISTRY COMMON GROUP :   Lab Results   Component Value Date    GLUCOSE 118 09/05/2019     Recent Labs     09/03/19  0515 09/04/19  0500 09/05/19  0558   GLUCOSE 119* 116* 118*   CALCIUM 7.7* 8.5 8.5         RADIOLOGY:        Imaging Results.   Chest X Ray reviewed by me        Electronically Signed: Mauricio Sweeney MD 9/5/2019 9:32 AM

## 2019-09-05 NOTE — PROGRESS NOTES
Sedation vacation started at this time, see MAR. Will continue to monitor closely. Bilateral soft restraints remain in place for safety from pulling at lines/tubes.   Kourtney Raza RN

## 2019-09-05 NOTE — PROGRESS NOTES
level of 15-20  · Endotracheal tube care and pulmonary toilet  · Cholecystostomy drain site care  · Pressure ulcer prevention precaution  · DVT prophylaxis  · Discussed all above with medical ICU team      Drug Monitoring:    · Continue monitoring for antibiotic toxicity as follows: Vanco trough, CMP  · Continue to watch for following: new or worsening fever, new hypotension, hives, lip swelling and redness or purulence at vascular access sites. I/v access Management:    · Continue to monitor i.v access sites for erythema, induration, discharge or tenderness. · As always, continue efforts to minimize tubes/lines/drains as clinically appropriate to reduce chances of line associated infections. Risk of Complications/Morbidity: High     · Illness(es)/ Infection present that pose threat to life/bodily function. · There is potential for severe exacerbation of infection/side effects of treatment. · Therapy requires intensive monitoring for antimicrobial agent toxicity. Thank you for involving me in the care of your patient. I will continue to follow. If you have anyadditional questions, please do not hesitate to contact me. Subjective: Interval history: Patient was seen and examined at bedside. Interval history was obtained. She remains intubated. Remains on ventilator. She is having fever. REVIEW OF SYSTEMS:      Review of Systems   Unable to perform ROS: Intubated         Past Medical History: All past medical history reviewed today. Past Medical History:   Diagnosis Date    Arthritis     BACK    Asthma     Hypertension     Migraine        Past Surgical History: All past surgical history was reviewed today.     Past Surgical History:   Procedure Laterality Date    ECTOPIC PREGNANCY SURGERY      FOOT SURGERY Right 4/9/13    HAMMERTOE CORRECTION 2ND TOE RIGHT FOOT    KIDNEY STONE SURGERY      TUMOR REMOVAL      fatty  arm         Immunization History: All immunization history was reviewed by me today. Immunization History   Administered Date(s) Administered    Influenza Virus Vaccine 10/15/2014    Influenza Whole 10/01/2012    Pneumococcal Conjugate 13-valent ([id number]) 02/05/2015    Pneumococcal Polysaccharide ([id number]) 10/15/2012       Family History: All family history was reviewed today. Problem Relation Age of Onset    Heart Disease Mother     Cancer Father         kidney       Objective:       PHYSICAL EXAM:      Vitals:   Vitals:    09/05/19 1248 09/05/19 1250 09/05/19 1537 09/05/19 1539   BP:       Pulse: 101  101    Resp: 14 14 16    Temp:       TempSrc:       SpO2: 100% 100%  97%   Weight:       Height:           Physical Exam       General: intubated and sedated, on ventilator, febrile today  HEENT: normocephalic, atraumatic, sclera clear, pupils equal, light reflex preserved bilaterally, endotracheal tube in place  Cardiovascular: RRR, no murmurs/rubs/gallops detected  Pulmonary: CTABL, no rhonchi/rales  Abdomen/GI: Cholecystostomy drain noted in the right upper quadrant, ongoing bilious serous drainage  Neuro: sedated, PERRL, moves all extremities when off sedation per RN  Skin: no skin tears or ulcers, no rash   Musculoskeletal:  No joint swelling, redness. No limitation of range of passive motion  Genitourinary: Fuller's catheter in place  Psych: could not assess  Lymphatic/Immunologic: No obvious bruising, no cervical lymphadenopathy    Lines: All vascular access sites are healthy with no local erythema, discharge or tenderness    Intake and output:    I/O last 3 completed shifts: In: 5789.8 [I.V.:3239.8; NG/GT:1700; IV Piggyback:850]  Out: 7480 [Urine:7000; Drains:480]    Lab Data:   All available labs and old records have been reviewed by me.     CBC:  Recent Labs     09/03/19  0515 09/04/19  0500 09/05/19  0558   WBC 8.7 6.0 7.9   RBC 3.04* 3.30* 3.56*   HGB 8.9* 9.5* 10.3*   HCT 27.3* 29.4* 31.4*   * 131* 134*   MCV 89.9 89.1 88.2   MCH 29.2

## 2019-09-05 NOTE — PROGRESS NOTES
hours. Pathology:  NA    Imaging:  I have personally reviewed the following films:    Ct Abdomen Pelvis Wo Contrast Additional Contrast? None    Result Date: 8/31/2019  EXAMINATION: CT OF THE ABDOMEN AND PELVIS WITHOUT CONTRAST 8/31/2019 2:09 pm TECHNIQUE: CT of the abdomen and pelvis was performed without the administration of intravenous contrast. Multiplanar reformatted images are provided for review. Dose modulation, iterative reconstruction, and/or weight based adjustment of the mA/kV was utilized to reduce the radiation dose to as low as reasonably achievable. COMPARISON: 08/30/2019 HISTORY: ORDERING SYSTEM PROVIDED HISTORY: suspected bleeding in drain site area TECHNOLOGIST PROVIDED HISTORY: Additional Contrast?->None Reason for Exam: suspected bleeding in drain site area Acuity: Unknown Type of Exam: Ongoing FINDINGS: Lower Chest: There are small bilateral pleural effusions with adjacent collapsing consolidation within the lower lungs, increased when compared to the previous exam.  Emphysema within the lower lungs again noncontrast imaging of the base of the heart is unremarkable. Organs: Patient has undergone interval transhepatic cholecystostomy, with a drain remaining in place. The gallbladder is now decompressed. There continues to be foci of hyperdensity within the gallbladder, compatible with the gallstones seen previously. No hematoma is detected. No abnormal fluid is identified along the lateral extrahepatic course of the drain. No significant edema is identified within the fat adjacent to the drain. Noncontrast imaging the remainder of the liver is otherwise unremarkable. Noncontrast imaging of the spleen and pancreas unremarkable. Bilateral chronic adrenal calcifications again noted. Bilateral perinephric fat stranding is seen. Contrast is being excreted within the renal collecting system from the contrast enhanced scan from yesterday. GI/Bowel: Large bowel is unremarkable.   Appendix is HISTORY: ORDERING SYSTEM PROVIDED HISTORY: abd pain TECHNOLOGIST PROVIDED HISTORY: Additional Contrast?->None Reason for Exam: abd pain Acuity: Acute Type of Exam: Initial FINDINGS: Despite repetition, there is moderate respiratory motion artifact. Lower Chest: Emphysema noted. Very small bilateral pleural effusions are identified. Organs: Again, respiratory motion limits the evaluation. Given that limitation, no focal hepatic abnormality is identified. There is new gallbladder distension, with evidence of gallstones at the gallbladder neck. Mild intrahepatic biliary dilation is present, and that appears new when compared to the previous exam.  There is also common duct dilation measuring up to 10 mm, which is new when compared to the previous exam (on the previous exam, the common duct measured 2-3 mm). No splenic abnormalities are seen. Adrenal calcifications are again noted, compatible with remote trauma or infection, requiring no further follow-up. The pancreas is atrophied but otherwise unremarkable. There is evidence of probable nephrolithiasis on the right. No hydronephrosis or hydroureter is seen. A ureteral calculus is not found. GI/Bowel: No large bowel abnormalities are identified. The appendix is not well visualized but no asymmetric pericecal inflammation is seen. There is a small inguinal hernia which contains a short knuckle of small bowel, but no evidence of obstruction is identified. The small bowel is otherwise unremarkable. The distal esophagus is patulous and fluid-filled, suggesting probable reflux. Mild mural thickening of the distal esophagus noted. Stomach is fluid-filled but otherwise unremarkable duodenal sweep unremarkable. Pelvis: Uterus is surgically absent. Urinary bladder unremarkable. Peritoneum/Retroperitoneum: The abdominal aorta is normal in caliber. Mild vascular calcifications are seen. The superior mesenteric artery is enhancing. No lymphadenopathy.  Bones/Soft

## 2019-09-05 NOTE — PLAN OF CARE
impaired coagulation  Description  Absence of signs or symptoms of impaired coagulation  Outcome: Ongoing     Problem: MECHANICAL VENTILATION  Goal: Patient will maintain patent airway  Outcome: Ongoing  Goal: Oral health is maintained or improved  Outcome: Ongoing  Goal: ET tube will be managed safely  Outcome: Ongoing  Goal: Ability to express needs and understand communication  Outcome: Ongoing     Problem: Nutrition  Goal: Optimal nutrition therapy  Outcome: Ongoing     Problem: Pain:  Goal: Pain level will decrease  Description  Pain level will decrease  Outcome: Ongoing  Goal: Control of acute pain  Description  Control of acute pain  Outcome: Ongoing  Goal: Control of chronic pain  Description  Control of chronic pain  Outcome: Ongoing

## 2019-09-05 NOTE — PROGRESS NOTES
fever    Bacteremia, gram-negative  E Coli growth in blood cultures, 2/2 bottles  Seen by ID  On ciprofloxacin and metronidazole, based on C&S results    Acute cholecystitis   Not stable enough for cholecystectomy, though this would be otherwise indicated  Cholecystostomy performed for drainage of infection. So far stable, with good drainage     Chronic AF  Digoxin effective in controlling the HR  Continue same     Acute hypoxemic respiratory failure  Required intubation and mechanical ventilation  Still on mechanical ventilation  Pulmonary/CC following     COPD  No signs of exacerbation  No need for steroids    Obstructive jaundice  Caused by cholecystitis and cholelithiasis. Bilirubin normalized with drainage provided by cholecystostomy      DVT Prophylaxis: heparin drip for AF   Diet: DIET TUBE FEED CONTINUOUS/CYCLIC NPO; STANDARD WITHOUT FIBER; Orogastric; 20; 20; 24  Diet Tube Feed Modular:  Code Status: Full Code    PT/OT Eval Status: not possible    Dispo - inpatient.  Cannot estimate length of stay due to acuity and complexity of the medical issues    Tamika Mcnamara MD

## 2019-09-05 NOTE — PROGRESS NOTES
[facility]   Electrophysiology Nurse Practitioner  Pre-Consult Rounding    Date: 9/5/2019  Date of admission: 8/30/2019  4:15 PM  Reason for Admission: Sepsis ([facility] [address] [A41.9]  [facility]    Consult Requesting Physician: Rock Leander MD    -Reason for Consultation: Atrial fibrillation    Chief Complaint   Patient presents with    Abdominal Pain     pt arrived via EMS.  abdominal pain started yesterday. n/v.     HISTORY OF PRESENT ILLNESS: History obtained from patient and medical record. Юлия Snell is a 67 y.o. female with a past medical history of HTN, COPD and dementia. In June 2018, pt was at her PCP office for pre-op physical and the EKG showed atrial fibrillation with controlled ventricular rate. Pt was unaware that she was in an abnormal heart rhythm, but reports fatigue and SOB which she associated with her COPD. S/p DCCV on 9/20/18. Pt presented to ER with severe abdominal pain, diarrhea, and nausea for several days. Imaging demonstrated acute cholecystitis, general surgery placed percutaneous drain due to being too ill for general surgery at this time. Found to have gram negative bacteremia Developed acute respiratory failure requiring intubation. Interval Hx: Today, she is being seen for afib. Remains sedated on ventilator. In AF, rate controlled in 80s. Off pressors. Plan to keep her intubated due to fevers and possible surgery. Patient seen and examined. Clinical notes reviewed. Telemetry reviewed. No major events overnight. Allergies:   Allergies   Allergen Reactions    Alendronate Sodium      Other reaction(s): jaw pain---concern 4 thad necro, jaw pain---concern 4 thad necro (mild to moderate)    Medroxyprogesterone Acetate      Other reaction(s): depressed mood, depressed mood (mild to moderate)    Acetaminophen     Formoterol Fumarate      Other reaction(s): dry mouth    Milk-Related Compounds     Mometasone Furoate      Other reaction(s): dry mouth  Penicillins Hives     CAN TAKE KEFLEX  Other reaction(s): Rash    Procaine Hcl      Inc heart rate    Tiotropium      Other reaction(s): dry mouth       Home Meds:  Prior to Visit Medications    Medication Sig Taking? Authorizing Provider   amitriptyline (ELAVIL) 10 MG tablet Take 10 mg by mouth nightly Yes Historical Provider, MD   warfarin (COUMADIN) 4 MG tablet TAKE 1 TABLET BY MOUTH EVERY DAY Yes Anurag Laser, APRN - CNP   propranolol (INDERAL LA) 60 MG extended release capsule Take 60 mg by mouth every morning Yes Historical Provider, MD   oxyCODONE (OXY-IR) 15 MG immediate release tablet Take 15 mg by mouth 3 times daily. . Yes Historical Provider, MD   propranolol (INDERAL LA) 120 MG extended release capsule  Yes Historical Provider, MD   vitamin D (ERGOCALCIFEROL) 24344 UNITS CAPS capsule 50,000 Units once a week  Yes Historical Provider, MD   Tiotropium Bromide Monohydrate (SPIRIVA RESPIMAT) 2.5 MCG/ACT AERS Inhale 2 puffs into the lungs daily Yes Phoenix Beebe MD   venlafaxine (EFFEXOR-XR) 150 MG XR capsule Take 150 mg by mouth daily. XR Yes Historical Provider, MD   albuterol (PROVENTIL HFA;VENTOLIN HFA) 108 (90 BASE) MCG/ACT inhaler Inhale 2 puffs into the lungs every 6 hours as needed. Yes Historical Provider, MD   topiramate (TOPAMAX) 50 MG tablet Take 50 mg by mouth 2 times daily. Yes Historical Provider, MD        Past Medical History:  Past Medical History:   Diagnosis Date    Arthritis     BACK    Asthma     Hypertension     Migraine       Past Surgical History:    has a past surgical history that includes Ectopic pregnancy surgery; Kidney stone surgery; tumor removal; and Foot surgery (Right, 4/9/13). Social History:  Reviewed. reports that she has quit smoking. She has never used smokeless tobacco. She reports that she does not drink alcohol. Family History:  Reviewed. family history includes Cancer in her father; Heart Disease in her mother.      Review of System:  Unable to

## 2019-09-06 PROBLEM — E83.42 HYPOMAGNESEMIA: Status: ACTIVE | Noted: 2019-01-01

## 2019-09-06 NOTE — PROGRESS NOTES
vancomycin trough below 20 at all times. Avoid increasing the dose of vancomycin above a total of 4 grams in a 24-hour period in patients younger than 45 years and above 3 grams in a 24-hour period in a patient of age 39 years or older. Continue to monitor serum creatinine and Vanco levels closely, while the patient is on I/v Vancomycin. · Continue IV ciprofloxacin 400 mg every 12 hour  · Continue IV Flagyl 500 mg every 8 hours  · If patient continues to have fever despite IV vancomycin and the CT scan does not show any new abscesses, will plan to switch ciprofloxacin and Flagyl to IV meropenem  · Already on anticoagulation with heparin for atrial fibrillation. Low risk of DVT or PE  · Continue to monitor her vitals closely  · Endotracheal tube care and pulmonary toilet  · Cholecystostomy drain site care  · Pressure ulcer prevention precaution  · Discussed all above with medical ICU team      Drug Monitoring:    · Continue monitoring for antibiotic toxicity as follows: Vanco trough, CMP, QTc interval  · Continue to watch for following: new or worsening fever, new hypotension, hives, lip swelling and redness or purulence at vascular access sites. I/v access Management:    · Continue to monitor i.v access sites for erythema, induration, discharge or tenderness. · As always, continue efforts to minimize tubes/lines/drains as clinically appropriate to reduce chances of line associated infections. Risk of Complications/Morbidity: High     · Illness(es)/ Infection present that pose threat to life/bodily function. · There is potential for severe exacerbation of infection/side effects of treatment. · Therapy requires intensive monitoring for antimicrobial agent toxicity. Thank you for involving me in the care of your patient. I will continue to follow. If you have anyadditional questions, please do not hesitate to contact me. Subjective: Interval history: Patient was seen and examined at bedside. Additional Contrast? None   Final Result   Moderate motion degradation. The gallbladder is dilated, with cholelithiasis noted. Common duct also is   dilated, which is new when compared to the previous exam.  There is suspected   mild pericholecystic edema, but motion is present. All-in-all, cholecystitis   is a concern. Very small bilateral pleural effusions. Patulous fluid-filled distal esophagus. Correlate with clinical evidence of   reflux and possible esophagitis. Nonobstructing right-sided nephrolithiasis. Small inguinal hernia contains several short knuckles of small bowel, but   without evidence obstruction. Medications: All current and past medications were reviewed.      propranolol  10 mg Oral TID    ciprofloxacin  400 mg Intravenous Q12H    famotidine (PEPCID) injection  20 mg Intravenous BID    lidocaine 1 % injection  5 mL Intradermal Once    sodium chloride flush  10 mL Intravenous 2 times per day    digoxin  125 mcg Intravenous Daily    topiramate  50 mg Oral BID    sodium chloride flush  10 mL Intravenous 2 times per day    metroNIDAZOLE  500 mg Intravenous Q8H        dextrose 100 mL/hr at 09/06/19 0322    heparin (porcine) 20 Units/kg/hr (09/06/19 0250)    propofol 30 mcg/kg/min (09/06/19 0703)    fentaNYL (SUBLIMAZE) infusion 1 mcg/kg/hr (09/06/19 0250)    norepinephrine Stopped (09/02/19 2259)       albuterol sulfate HFA, ipratropium, sodium chloride flush, haloperidol lactate, potassium chloride, magnesium sulfate, heparin (porcine), heparin (porcine), morphine, ondansetron, sodium chloride flush, magnesium hydroxide, ondansetron, acetaminophen, perflutren lipid microspheres      Problem list:       Patient Active Problem List   Diagnosis Code    Rena M20.40    Migraine G43.909    Hypertriglyceridemia E78.1    Asthma J45.909    Chronic back pain M54.9, G89.29    Respiratory failure with hypercapnia ([facility] [address] J96.92    Hiatal hernia

## 2019-09-06 NOTE — PROGRESS NOTES
tolerate EN at goal    · Monitoring: TF Intake, TF Tolerance, Weight, Pertinent Labs      Electronically signed by Jacqueline Roche RD, LD on 9/6/19 at 10:27 AM    Contact Number: [phone]

## 2019-09-06 NOTE — PROGRESS NOTES
Peritoneum/Retroperitoneum: No abdominal aortic aneurysm.  Adrenal glands are   unremarkable.  A few nonobstructing stones in the right kidney measuring up   to 4 mm.  Kidneys enhance symmetrically.  Mild soft tissue edema.       Bones/Soft Tissues: No acute bony abnormality.           Impression   Small to moderate left and small right pleural effusions with partial   consolidation in the lower lobes.  Findings may be related to infection or   atelectasis.       Mild soft tissue edema in the abdomen and pelvis as well as small volume   ascites in the pelvis.       External drain terminating in the gallbladder.  No biliary dilatation.

## 2019-09-06 NOTE — PROGRESS NOTES
Reassessment complete. VSS. See flowsheet for more information. Temp continues to increase. Tylenol to be given (See MAR). No other changes noted. Ruslan Roa.  Chris HERRERA, RN, CCRN, CNL

## 2019-09-06 NOTE — PROGRESS NOTES
unremarkable. Peritoneum/Retroperitoneum: The abdominal aorta is normal in caliber. Mild vascular calcifications are seen. The superior mesenteric artery is enhancing. No lymphadenopathy. Bones/Soft Tissues: No osteolytic or osteoblastic bone lesions are identified. No acute bony abnormalities are detected. Again, evaluation limited by motion artifact. Moderate motion degradation. The gallbladder is dilated, with cholelithiasis noted. Common duct also is dilated, which is new when compared to the previous exam.  There is suspected mild pericholecystic edema, but motion is present. All-in-all, cholecystitis is a concern. Very small bilateral pleural effusions. Patulous fluid-filled distal esophagus. Correlate with clinical evidence of reflux and possible esophagitis. Nonobstructing right-sided nephrolithiasis. Small inguinal hernia contains several short knuckles of small bowel, but without evidence obstruction. Xr Chest Portable    Result Date: 9/3/2019  EXAMINATION: ONE XRAY VIEW OF THE CHEST 9/3/2019 3:04 pm COMPARISON: 09/02/2019 HISTORY: ORDERING SYSTEM PROVIDED HISTORY: PICC Placement - tip verification TECHNOLOGIST PROVIDED HISTORY: Reason for exam:->PICC Placement - tip verification Reason for exam:-> Reason for Exam: PICC Placement - tip verification - LEFT - A FIB Acuity: Acute Type of Exam: Ongoing FINDINGS: Nasogastric tube extends to the upper abdomen. Endotracheal tube is approximately 3 cm proximal to the julian. Heterogeneous bilateral pulmonary opacity is again demonstrated. Hazy opacity at the lung bases, not significantly changed. No pneumothorax. Cardiac and mediastinal silhouettes are unchanged. Calcification of aorta. Right upper quadrant catheter is again demonstrated. Left upper extremity PICC line extends to the expected location of superior vena cava. Bilateral edema or pneumonia, similar to prior, with small bilateral pleural effusions.  Support apparatus as

## 2019-09-06 NOTE — PROGRESS NOTES
Mabeline Ruddle, MD Collette Dent, MD Renay Barren, MD                                  Office: [phone]                 Fax: [phone]          [facility]                    NEPHROLOGY ICU PROGRESS NOTE:     PATIENT NAME: Jeff Jordan  : 1940  MRN: [id number]    Assessment and Plan   Acute kidney injury-improved. Likely prerenal/ischemic versus toxic- ATN in the setting of severe sepsis, cholecystitis, bacteremia, and hyponatremia with dehydration,  Estimated Creatinine Clearance: 60 mL/min (based on SCr of 0.7 mg/dL). Hypernatremia-on D5W. Free water via OGT. Better    Polyuria -Uosm 154 and urine lytes noted. Partial DI like picture. Vs post-ATN diuresis  Gave Desmopressin. Some improvement in polyuria , D/Pankaj D5W. BP is in to 140-150s/80s now  Continue to closely monitor for signs of dehydration/hypovolemia. Hypomagnesemia- replace per protocol, improving  Hypophosphatemia- improved  Hypocalcemia - improved    Severe sepsis , E. coli bacteremia with acute cholecystitis status post drain. No plans for OR per surgery  Acute hypoxic severe sepsis with respiratory failure. Intubated on ventilator. High complexity. Multiple medical problems. Discussed with treatment team.   Thank you for allowing me to participate in this patient's care. Please do not hesitate to contact me for any questions/concerns. We will follow along with you. Adamaris Artis MD       [facility] [address] [place] [address] Road   Phone: [phone] or Via [employer]coin  Fax: [phone]            Subjective:   Remains Sedated , intubated on ventilator. Fuller catheter -> High urine output.  -- slowing down now        Objective:      EXAM  Vitals:    19 1500   BP: (!) 156/71   Pulse: 98   Resp: 16   Temp:    SpO2: 96%       Intake/Output Summary (Last 24 hours) at 2019 1544  Last data filed at 2019 1235  Gross per 24 hour   Intake 3949 ml   Output 6125 ml   Net -2176 ml * 112* 109   CO2 23 25 25   PHOS 2.0* 3.0 2.9   BUN 7 6* 8   CREATININE 0.7 0.7 0.7     Magnesium:   Lab Results   Component Value Date    MG 1.70 09/06/2019    MG 1.60 09/05/2019    MG 1.50 09/04/2019     Lab Results   Component Value Date    CREATININE 0.7 09/06/2019       Arterial Blood Gasses  No results for input(s): PH, PCO2, PO2 in the last 72 hours. Invalid input(s): [id number], INSPIREDO2    UA:  Recent Labs     09/05/19  1115   COLORU YELLOW   PHUR 7.0   CLARITYU Clear   SPECGRAV 1.005   LEUKOCYTESUR Negative   UROBILINOGEN 0.2   BILIRUBINUR Negative   BLOODU Negative   GLUCOSEU Negative       LIVER PROFILE:   Recent Labs     09/04/19  0500 09/05/19  0558 09/06/19  0412   AST 35 42* 32   ALT 53* 44* 33   BILITOT 1.2* 1.0 0.9   ALKPHOS 128 138* 126     PT/INR:    Lab Results   Component Value Date    PROTIME 16.9 08/30/2019    PROTIME 46.4 06/09/2019    PROTIME 29.2 09/06/2018    PROTIME 28.7 09/05/2018    INR 1.48 08/30/2019    INR 2.7 08/26/2019    INR 1.7 08/05/2019    INR 2.6 07/15/2019    INR 4.07 06/09/2019    INR 2.60 09/20/2018     PTT:    Lab Results   Component Value Date    APTT 92.0 09/06/2019    APTT 86.6 09/06/2019    APTT 48.3 09/05/2019     NETTA:  No results found for: ANATITER, NETTA  CHEMISTRY COMMON GROUP :   Lab Results   Component Value Date    GLUCOSE 117 09/06/2019     Recent Labs     09/04/19  0500 09/05/19  0558 09/06/19  0412   GLUCOSE 116* 118* 117*   CALCIUM 8.5 8.5 8.3         RADIOLOGY:        Imaging Results.   Chest X Ray reviewed by me

## 2019-09-06 NOTE — PROGRESS NOTES
09/06/19 0748   Vent Patient Data   Plateau Pressure 12 DAW55   Static Compliance 54 mL/cmH2O   Dynamic Compliance 48 mL/cmH2O

## 2019-09-06 NOTE — PROGRESS NOTES
metroNIDAZOLE  500 mg Intravenous Q8H     PRN Meds: albuterol sulfate HFA, ipratropium, sodium chloride flush, haloperidol lactate, potassium chloride, magnesium sulfate, heparin (porcine), heparin (porcine), morphine, ondansetron, sodium chloride flush, magnesium hydroxide, ondansetron, acetaminophen, perflutren lipid microspheres      DVT Prophylaxis: Heparin  Diet: DIET TUBE FEED CONTINUOUS/CYCLIC NPO; STANDARD WITHOUT FIBER; Orogastric; 20; 20; 24  Diet Tube Feed Modular:  Code Status: Full Code    Dispo: Critically ill remains intubated and sedated in ICU.    ____________________________________________________________________________    Subjective:   Overnight Events:   Uneventful overnight  Continues to still spiked temperature  Antibiotics tacked up yesterday with addition of vancomycin  Fluid balance 3 L positive      Physical Exam Performed:  BP (!) 140/70   Pulse 98   Temp 100.4 °F (38 °C) (Temporal)   Resp 15   Ht 5' 2\" (1.575 m)   Wt 152 lb 12.5 oz (69.3 kg)   SpO2 98%   BMI 27.94 kg/m²   General appearance: No apparent distress, appears stated age and cooperative. HEENT: Normocephalic, atraumatic, MMM, No sclera icterus/conjuctival palor  Neck: Supple, no thyromegally. No jugular venous distention. Respiratory:  Normal respiratory effort. Clear to auscultation, no Rales/Wheezes/Rhonchi. Cardiovascular: S1/S2 without murmurs, rubs or gallops. RRR  Abdomen: Soft, non-tender, non-distended, bowel sounds present. Musculoskeletal: No clubbing, cyanosis or edema bilaterally. Skin: Skin color, texture, turgor normal.  No rashes or lesions.   Neurologic:  Cranial nerves: II-XII intact, JENNIFER, No focal sensory/motor deficits  Psychiatric: Alert and oriented, thought content appropriate  Capillary Refill: Brisk,< 3 seconds   Peripheral Pulses: +2 palpable, equal bilaterally       Intake/Output Summary (Last 24 hours) at 9/6/2019 0910  Last data filed at 9/6/2019 0600  Gross per 24 hour   Intake 5645.8 ml   Output 7285 ml   Net -1639.2 ml       Labs:   Recent Labs     09/04/19  0500 09/05/19  0558 09/06/19  0412   WBC 6.0 7.9 8.3   HGB 9.5* 10.3* 10.0*   HCT 29.4* 31.4* 30.9*   * 134* 170      Recent Labs     09/04/19  0500 09/05/19  0558 09/06/19  0412   * 145 142   K 3.6 4.0 3.9   * 112* 109   CO2 23 25 25   BUN 7 6* 8   CREATININE 0.7 0.7 0.7   CALCIUM 8.5 8.5 8.3   PHOS 2.0* 3.0 2.9   AST 35 42* 32   ALT 53* 44* 33   BILITOT 1.2* 1.0 0.9   ALKPHOS 128 138* 126     No results for input(s): CKTOTAL, TROPONINI in the last 72 hours. Urinalysis:    Lab Results   Component Value Date    NITRU Negative 09/05/2019    BLOODU Negative 09/05/2019    SPECGRAV 1.005 09/05/2019    GLUCOSEU Negative 09/05/2019       Radiology:  XR CHEST PORTABLE   Final Result   Increased pulmonary edema. No significant change of bilateral pleural effusions and bibasilar airspace   disease         XR CHEST PORTABLE   Final Result   Bilateral edema or pneumonia, similar to prior, with small bilateral pleural   effusions. Support apparatus as above. XR CHEST PORTABLE   Final Result   Findings suggestive of worsening pleural-parenchymal disease when compared to   the study of 9/1/2019 as described above. XR CHEST 1 VW   Final Result   1. Tip of the endotracheal tube is above the julian. Additional support   hardware, as detailed above. 2. Enlarged cardiomediastinal silhouette with findings suggestive of   pulmonary interstitial edema. Correlation with volume status is recommended. CT ABDOMEN PELVIS WO CONTRAST Additional Contrast? None   Final Result   Expected appearance of the cholecystostomy drain, with no significant   hematoma or edema along the extrahepatic course of the drain. The coil of   the drain is located within the gallbladder lumen, and the gallbladder is now   decompressed. Mildly hyperdense gallstones are again detected.       Evidence of increasing anasarca,

## 2019-09-06 NOTE — PROGRESS NOTES
[facility], [place]   Electrophysiology Nurse Practitioner  Pre-Consult Rounding    Date: 9/6/2019  Date of admission: 8/30/2019  4:15 PM  Reason for Admission: Sepsis ([facility] [A41.9]  [facility] [A41.9]    Consult Requesting Physician: Archie Ladd MD    -Reason for Consultation: Atrial fibrillation    Chief Complaint   Patient presents with    Abdominal Pain     pt arrived via EMS.  abdominal pain started yesterday. n/v.     HISTORY OF PRESENT ILLNESS: History obtained from patient and medical record. Don Nagy is a 67 y.o. female with a past medical history of HTN, COPD and dementia. In June 2018, pt was at her PCP office for pre-op physical and the EKG showed atrial fibrillation with controlled ventricular rate. Pt was unaware that she was in an abnormal heart rhythm, but reports fatigue and SOB which she associated with her COPD. S/p DCCV on 9/20/18. Pt presented to ER with severe abdominal pain, diarrhea, and nausea for several days. Imaging demonstrated acute cholecystitis, general surgery placed percutaneous drain due to being too ill for general surgery at this time. Found to have gram negative bacteremia Developed acute respiratory failure requiring intubation. Interval Hx: Today, she is being seen for afib. Remains sedated on ventilator. In AF, rate controlled in 90s. Off pressors. Having difficulty with extubation due to pt becoming agitated with attempts to wean. Low grade fevers overnight, new cultures sent yesterday. Daughter at bedside, very emotional given state of mom's health plus her aunt recently moved to hospice. Patient seen and examined. Clinical notes reviewed. Telemetry reviewed. No major events overnight. Allergies:   Allergies   Allergen Reactions    Alendronate Sodium      Other reaction(s): jaw pain---concern 4 thad necro, jaw pain---concern 4 thad necro (mild to moderate)    Medroxyprogesterone Acetate      Other reaction(s): depressed mood, 19  0558 19  0412   WBC 6.0 7.9 8.3   HGB 9.5* 10.3* 10.0*   HCT 29.4* 31.4* 30.9*   MCV 89.1 88.2 88.8   * 134* 170     Thyroid: No results found for: TSH, O4FQSMP, P4IVJDP, THYROIDAB    LFTS:   Lab Results   Component Value Date    ALT 33 2019    AST 32 2019    ALKPHOS 126 2019    PROT 5.4 2019    AGRATIO 0.8 2019    BILITOT 0.9 2019     Cardiac Enzymes:   Lab Results   Component Value Date    TROPONINI <0.01 2019    TROPONINI <0.01 2019    TROPONINI <0.01 2019     Coags:   Lab Results   Component Value Date    PROTIME 16.9 2019    PROTIME 29.2 2018    INR 1.48 2019     EC19  AF RVR, rate 148, QRS 74, QTc 486    ECHO: 2018  Normal left ventricle size, wall thickness and systolic function with an EF of 55%. Mild aortic regurgitation.   Moderate mitral regurgitation. Mild tricuspid regurgitation with a estimated RVSP of 36 mmHg.     Cath: None    Problem List:   Patient Active Problem List    Diagnosis Date Noted    Fever     E coli bacteremia     Cholecystostomy care ([address]     Endotracheally intubated     On mechanically assisted ventilation (HCC)     Overweight     Chronic atrial fibrillation (HCC)     Permanent atrial fibrillation (HCC)     Acute abdomen     Agitation     Common bile duct dilatation     Septic shock due to Escherichia coli (HCC)     Septic shock (HCC)     Alveolar pneumonopathy (HCC)     Right upper quadrant abdominal pain     Acute respiratory failure with hypoxia ([address]     ARDS (adult respiratory distress syndrome) ([address]     Calculus of gallbladder with acute cholecystitis without obstruction     Sepsis ([address] 2019    Essential hypertension 10/07/2018    Atrial fibrillation with rapid ventricular response ([address] 2018    Otalgia of both ears 2017    Mass of right forearm 2017    Hiatal hernia 2015    Emphysema lung ([address] 2015    COPD verbalized understanding and agreed with the plan. Discussed plan with nurse. Thank you for allowing to us to participate in the care of Demetria Amaury.     HALEY Camara-CNP  [address]   Office: [phone]

## 2019-09-06 NOTE — PROGRESS NOTES
09/05/19 2004   Vent Patient Data   Plateau Pressure 12 ZBK00   Static Compliance 54 mL/cmH2O   Dynamic Compliance 48 mL/cmH2O

## 2019-09-06 NOTE — PROGRESS NOTES
MD Jermain Kahn MD Cletis Query, MD                                  Office: [phone]                 Fax: [phone]          [facility]                     NEPHROLOGY ICU INPATIENT PROGRESS NOTE:     PATIENT NAME: Bala Diamond  : 1940  MRN: [id number]    Subjective:   Sedated on ventilator. Fuller catheter. High urine output. Assessment and Plan   Acute kidney injury-improved. Hypomagnesemia- replace per protocol  Hypophosphatemia- improved  Hypernatremia-on D5W. Free water thru OGT. Better  Hypocalcemia - improved  Acute cholecystitis status post drain. Respiratory failure. Intubated on ventilator. High urine output -Uosm 154 and urine lytes noted. Partial DI like picture. Will give Desmopressin. Follow urine output. D/C D5W. Discussed with family members         EXAM  Vitals:    19 0900   BP: 138/60   Pulse: 100   Resp: 13   Temp:    SpO2: 99%       Intake/Output Summary (Last 24 hours) at 2019 1000  Last data filed at 2019 0600  Gross per 24 hour   Intake 5645.8 ml   Output 7285 ml   Net -1639.2 ml      intubated and on ventilator. External exam of the ears and nose are normal  HENT: exam is normal  Eyes: Pupils are equal, round,  CVS.  Heart sounds are normal.  RS. Bilateral Basal rales. PA soft , bowel sounds are normal no distension and no tenderness to palpation.   Skin No rash , No palpable nodules  Ext-1+ edema        Principal Problem:    Acute respiratory failure with hypoxia (HCC)  Active Problems:    Hypertriglyceridemia    COPD (chronic obstructive pulmonary disease) (HCC)    Atrial fibrillation with rapid ventricular response (HCC)    Essential hypertension    Sepsis ([address]    Right upper quadrant abdominal pain    ARDS (adult respiratory distress syndrome) ([facility])    Calculus of gallbladder with acute cholecystitis without obstruction    Acute abdomen    Septic shock due to Escherichia coli ([address]    Alveolar

## 2019-09-07 NOTE — PROGRESS NOTES
 sodium chloride flush  10 mL Intravenous 2 times per day    digoxin  125 mcg Intravenous Daily    topiramate  50 mg Oral BID    sodium chloride flush  10 mL Intravenous 2 times per day     PRN Meds: albuterol sulfate HFA, ipratropium, sodium chloride flush, haloperidol lactate, potassium chloride, magnesium sulfate, heparin (porcine), heparin (porcine), morphine, ondansetron, sodium chloride flush, magnesium hydroxide, ondansetron, acetaminophen, perflutren lipid microspheres      DVT Prophylaxis: Heparin  Diet: DIET TUBE FEED CONTINUOUS/CYCLIC NPO; STANDARD WITHOUT FIBER; Orogastric; 20; 60; 24  Code Status: Full Code    Dispo: Critically ill remains intubated and sedated in ICU.    ____________________________________________________________________________    Subjective:   Overnight Events:   Uneventful overnight  still spikes temperature    Physical Exam Performed:  BP (!) 147/68   Pulse 109   Temp 101.2 °F (38.4 °C) (Temporal)   Resp 20   Ht 5' 2\" (1.575 m)   Wt 158 lb (71.7 kg)   SpO2 100%   BMI 28.90 kg/m²   General appearance: No apparent distress, appears stated age and cooperative. HEENT: Normocephalic, atraumatic, MMM, No sclera icterus/conjuctival palor  Neck: Supple, no thyromegally. No jugular venous distention. Respiratory:  Normal respiratory effort. Clear to auscultation, no Rales/Wheezes/Rhonchi. Cardiovascular: S1/S2 without murmurs, rubs or gallops. RRR  Abdomen: Soft, non-tender, non-distended, bowel sounds present. Musculoskeletal: No clubbing, cyanosis or edema bilaterally. Skin: Skin color, texture, turgor normal.  No rashes or lesions.   Neurologic:  Cranial nerves: II-XII intact, JENNIFER, No focal sensory/motor deficits  Psychiatric: Alert and oriented, thought content appropriate  Capillary Refill: Brisk,< 3 seconds   Peripheral Pulses: +2 palpable, equal bilaterally       Intake/Output Summary (Last 24 hours) at 9/7/2019 0944  Last data filed at 9/7/2019 0507  Gross per pulmonary interstitial edema. Correlation with volume status is recommended. CT ABDOMEN PELVIS WO CONTRAST Additional Contrast? None   Final Result   Expected appearance of the cholecystostomy drain, with no significant   hematoma or edema along the extrahepatic course of the drain. The coil of   the drain is located within the gallbladder lumen, and the gallbladder is now   decompressed. Mildly hyperdense gallstones are again detected. Evidence of increasing anasarca, with increasing bilateral pleural effusions   with adjacent collapsing consolidation, increasing mesenteric and   retroperitoneal fat stranding, and with increasing subcutaneous fat stranding. CT ABSCESS DRAINAGE W CATH PLACEMENT S&I   Final Result   Findings consistent with acute, calculous cholecystitis. Successful percutaneous cholecystostomy tube by transhepatic approach. XR CHEST PORTABLE   Final Result   Bilateral airspace disease, increased in the left retrocardiac region         XR CHEST PORTABLE   Final Result   Endotracheal tube is in the expected position. Nasogastric tube appears to be within the distal esophagus. That should be   advanced approximately 12 additional cm. Location of this was discussed with   Dr. John Gorman at 8:20 p.m. on 08/30/2019. Pulmonary vascular congestion with patchy bilateral airspace disease,   concerning for pulmonary edema. XR CHEST PORTABLE   Final Result   Mild cardiomegaly. Patchy airspace opacities bilaterally, may be related to pulmonary edema   versus pneumonia. Mild left pleural effusion. CT ABDOMEN PELVIS W IV CONTRAST Additional Contrast? None   Final Result   Moderate motion degradation. The gallbladder is dilated, with cholelithiasis noted. Common duct also is   dilated, which is new when compared to the previous exam.  There is suspected   mild pericholecystic edema, but motion is present.   All-in-all, cholecystitis   is a concern. Very small bilateral pleural effusions. Patulous fluid-filled distal esophagus. Correlate with clinical evidence of   reflux and possible esophagitis. Nonobstructing right-sided nephrolithiasis. Small inguinal hernia contains several short knuckles of small bowel, but   without evidence obstruction.                  Pepito Blanco MD

## 2019-09-07 NOTE — PROGRESS NOTES
administration of intravenous contrast. Multiplanar reformatted images are provided for review. Dose modulation, iterative reconstruction, and/or weight based adjustment of the mA/kV was utilized to reduce the radiation dose to as low as reasonably achievable. COMPARISON: 08/30/2019 HISTORY: ORDERING SYSTEM PROVIDED HISTORY: suspected bleeding in drain site area TECHNOLOGIST PROVIDED HISTORY: Additional Contrast?->None Reason for Exam: suspected bleeding in drain site area Acuity: Unknown Type of Exam: Ongoing FINDINGS: Lower Chest: There are small bilateral pleural effusions with adjacent collapsing consolidation within the lower lungs, increased when compared to the previous exam.  Emphysema within the lower lungs again noncontrast imaging of the base of the heart is unremarkable. Organs: Patient has undergone interval transhepatic cholecystostomy, with a drain remaining in place. The gallbladder is now decompressed. There continues to be foci of hyperdensity within the gallbladder, compatible with the gallstones seen previously. No hematoma is detected. No abnormal fluid is identified along the lateral extrahepatic course of the drain. No significant edema is identified within the fat adjacent to the drain. Noncontrast imaging the remainder of the liver is otherwise unremarkable. Noncontrast imaging of the spleen and pancreas unremarkable. Bilateral chronic adrenal calcifications again noted. Bilateral perinephric fat stranding is seen. Contrast is being excreted within the renal collecting system from the contrast enhanced scan from yesterday. GI/Bowel: Large bowel is unremarkable. Appendix is mildly dilated, but without periappendiceal fat stranding to suggest acute appendicitis. Nasogastric tube is present within the stomach. The stomach, duodenal sweep, and the remainder of the small bowel are unremarkable.  Pelvis: Small amount of free pelvic fluid is noted, increased when compared to the previous

## 2019-09-07 NOTE — PROGRESS NOTES
Introduced self to patient. Initial shift assessment completed, see complex assessment in doc flowsheet. Pt sedated on vent. Pt febrile. Gallbladder drain in place. ETT, OG, and leggett in place. Questions answered. Call light within reach. Bed in low position with wheels locked. Encouraged to call for nurse as needed throughout the shift.

## 2019-09-07 NOTE — PROGRESS NOTES
Summary (Last 24 hours) at 9/7/2019 1139  Last data filed at 9/7/2019 0507  Gross per 24 hour   Intake 3493.59 ml   Output 4800 ml   Net -1306.41 ml       MEDICATIONS:  Scheduled Meds:   meropenem  1 g Intravenous Q8H    vancomycin  750 mg Intravenous Q12H    propranolol  20 mg Oral TID    famotidine (PEPCID) injection  20 mg Intravenous BID    lidocaine 1 % injection  5 mL Intradermal Once    sodium chloride flush  10 mL Intravenous 2 times per day    digoxin  125 mcg Intravenous Daily    topiramate  50 mg Oral BID    sodium chloride flush  10 mL Intravenous 2 times per day     PRN Meds:  albuterol sulfate HFA, ipratropium, sodium chloride flush, haloperidol lactate, potassium chloride, magnesium sulfate, heparin (porcine), heparin (porcine), morphine, ondansetron, sodium chloride flush, magnesium hydroxide, ondansetron, acetaminophen, perflutren lipid microspheres      PHYSICAL EXAM:  Vital Signs: BP (!) 147/68   Pulse 92   Temp 101.2 °F (38.4 °C) (Temporal)   Resp 19   Ht 5' 2\" (1.575 m)   Wt 158 lb (71.7 kg)   SpO2 100%   BMI 28.90 kg/m²      Gen: Intubated and on mechanical ventilation. ENT:   Endotracheal tube is in place. Resp:   No accessory muscle use. Clear lungs. CV:   PMI is normal. No murmur or gallop. GI:   Soft and not distended. No tenderness. Neuro:  Sedated. No tremor.       LAB RESULTS:  CBC:   Recent Labs     09/05/19  0558 09/06/19  0412 09/07/19 0513   WBC 7.9 8.3 9.1   HGB 10.3* 10.0* 9.9*   HCT 31.4* 30.9* 30.4*   MCV 88.2 88.8 88.1   * 170 215     CHEMISTRY:   Recent Labs     09/05/19  0558 09/06/19  0412 09/06/19  1545 09/07/19  0513    142 142 140   K 4.0 3.9  --  4.1   * 109  --  108   CO2 25 25  --  26   BUN 6* 8  --  13   CREATININE 0.7 0.7  --  0.7   PHOS 3.0 2.9  --  3.0   GLUCOSE 118* 117*  --  137*     LIVER PROFILE:   Recent Labs     09/05/19  0558 09/06/19  0412 09/07/19  0513   AST 42* 32 27   ALT 44* 33 25   BILITOT 1.0 0.9 0.7

## 2019-09-07 NOTE — PROGRESS NOTES
[address]   Electrophysiology Progress Note     Admit Date: 2019     Reason for follow up: Atrial fibrillation with rapid ventricular rates     HPI and Interval History: 67 y.o. female female past medical history significant for chronic permanent atrial fibrillation, hypertension, COPD, and dementia who has presented to the hospital with abdominal pain. She has been admitted with sepsis found to have acute cholecystitis and respiratory failure and has been intubated and being treated in ICU. She has remained intubated. She has been in atrial fibrillation with rapid ventricular rate and EP has been consulted for further evaluation. Patient remains intubated. Her heart rates ranges from 100-120 bpm.  History obtained with reviewing charts. She has had respiratory failure, ARDS, acute kidney jury and multiple electrolyte imbalance. Patient remains intubated on ventilator. Continues being febrile. Patient seen and examined. Clinical notes reviewed. Telemetry reviewed. No new complaint today. Review of System:  Cannot be obtained. Physical Examination:  Vitals:    19 1104   BP:    Pulse: 92   Resp: 19   Temp:    SpO2: 100%      In: 1154 [I.V.:729; NG/GT:425]  Out: 1390    Wt Readings from Last 3 Encounters:   19 158 lb (71.7 kg)   19 135 lb (61.2 kg)   19 143 lb (64.9 kg)     Temp  Av.9 °F (38.3 °C)  Min: 100.8 °F (38.2 °C)  Max: 101.2 °F (38.4 °C)  Pulse  Av.7  Min: 90  Max: 112  BP  Min: 147/68  Max: 165/64  SpO2  Av %  Min: 96 %  Max: 100 %  FiO2   Av %  Min: 30 %  Max: 30 %    Intake/Output Summary (Last 24 hours) at 2019 1528  Last data filed at 2019 0507  Gross per 24 hour   Intake 2233. 59 ml   Output 3220 ml   Net -986.41 ml     Telemetry: Afib with controlled rate. Constitutional: intubated and sedated. Head: Atraumatic. Mouth/Throat: ETT in place   Eyes: Conuctivae pale   Neck: Neck supple.    Cardiovascular:

## 2019-09-08 NOTE — PROGRESS NOTES
bilateral airspace disease,   concerning for pulmonary edema. XR CHEST PORTABLE   Final Result   Mild cardiomegaly. Patchy airspace opacities bilaterally, may be related to pulmonary edema   versus pneumonia. Mild left pleural effusion. CT ABDOMEN PELVIS W IV CONTRAST Additional Contrast? None   Final Result   Moderate motion degradation. The gallbladder is dilated, with cholelithiasis noted. Common duct also is   dilated, which is new when compared to the previous exam.  There is suspected   mild pericholecystic edema, but motion is present. All-in-all, cholecystitis   is a concern. Very small bilateral pleural effusions. Patulous fluid-filled distal esophagus. Correlate with clinical evidence of   reflux and possible esophagitis. Nonobstructing right-sided nephrolithiasis. Small inguinal hernia contains several short knuckles of small bowel, but   without evidence obstruction. Medications: All current and past medications were reviewed.      meropenem  1 g Intravenous Q8H    vancomycin  750 mg Intravenous Q12H    propranolol  20 mg Oral TID    famotidine (PEPCID) injection  20 mg Intravenous BID    lidocaine 1 % injection  5 mL Intradermal Once    sodium chloride flush  10 mL Intravenous 2 times per day    digoxin  125 mcg Intravenous Daily    topiramate  50 mg Oral BID    sodium chloride flush  10 mL Intravenous 2 times per day        fentaNYL (SUBLIMAZE) infusion 1 mcg/kg/hr (09/07/19 2341)    heparin (porcine) 18 Units/kg/hr (09/07/19 1808)    propofol 30 mcg/kg/min (09/08/19 1027)       albuterol sulfate HFA, ipratropium, sodium chloride flush, haloperidol lactate, potassium chloride, magnesium sulfate, heparin (porcine), heparin (porcine), morphine, magnesium hydroxide, ondansetron, acetaminophen      Problem list:       Patient Active Problem List   Diagnosis Code    Rena M20.40    Migraine G43.909    Hypertriglyceridemia

## 2019-09-08 NOTE — PROGRESS NOTES
grade fevers, no Leukocytosis. Repeat cultures- NGTD  -Continues antibiotics with meropenem and vancomycin as per ID  -follow-up repeat cultures  -Infectious disease following. Fevers with partial consolidation in the lower lobes bilaterally: This is suggestive of pneumonia and may explain her fevers  -continue meropenem and vancomycin      Calculus of gallbladder with acute cholecystitis without obstruction with right upper quadrant abdominal pain: Status post cholecystostomy tube placement with drainage. Per surgery, there is no indication for cholecystectomy in the near future. -Further management deferred to general surgery      Acute respiratory failure with hypoxia ([address] and alveolar pneumonopathy: she developed respiratory failure and hypoxia in the ER and was intubated. She remains on the vent. No evidence of pneumonia. Normal oli on sputum cultures done 9/1/2019. Sputum recultured done 9/5/2019  And NGTD  -Intensivist following and managing the vent       Active Problems:    Atrial fibrillation with rapid ventricular response [facility]): Patient with a history of chronic atrial fibrillation. RVR in the setting of sepsis and septic shock.   Rate stable on current regimen  - Continue digoxin and propranolol, heparin    Essential hypertension: Blood pressure controlled on propranolol        Resolved problems    Septic shock    Transaminitis    Hyponatremia    RAO    Hypomagnesemia      Medications:  Reviewed  Infusion Medications    fentaNYL (SUBLIMAZE) infusion 1 mcg/kg/hr (09/08/19 1141)    heparin (porcine) 18 Units/kg/hr (09/07/19 1808)    propofol 30 mcg/kg/min (09/08/19 1027)     Scheduled Medications    meropenem  1 g Intravenous Q8H    vancomycin  750 mg Intravenous Q12H    propranolol  20 mg Oral TID    famotidine (PEPCID) injection  20 mg Intravenous BID    sodium chloride flush  10 mL Intravenous 2 times per day    digoxin  125 mcg Intravenous Daily    topiramate  50 mg Oral BID    right-sided nephrolithiasis. Small inguinal hernia contains several short knuckles of small bowel, but   without evidence obstruction.          XR CHEST PORTABLE    (Results Pending)           Madelin Isaacs MD

## 2019-09-08 NOTE — PROGRESS NOTES
[facility] Daily Progress Note      Admit Date:  8/30/2019    Chief Complaint: Atrial fibrillation    Subjective:  Ms. Ariela Baer is intubated and sedated. Past medical history significant for chronic permanent atrial fibrillation, hypertension, COPD, and dementia who has presented to the hospital with abdominal pain. She has been admitted with sepsis found to have acute cholecystitis and respiratory failure and has been intubated and being treated in ICU. She has remained intubated. She has been in atrial fibrillation with rapid ventricular rate and EP has been consulted for further evaluation. Patient remains intubated. Her heart rates ranges from 100-120 bpm.  History obtained with reviewing charts. She has had respiratory failure, ARDS, acute kidney jury and multiple electrolyte imbalance.       Objective:   BP (!) 127/54   Pulse 90   Temp 100.3 °F (37.9 °C) (Temporal)   Resp 18   Ht 5' 2\" (1.575 m)   Wt 146 lb 9.7 oz (66.5 kg)   SpO2 98%   BMI 26.81 kg/m²       Intake/Output Summary (Last 24 hours) at 9/8/2019 1027  Last data filed at 9/8/2019 0600  Gross per 24 hour   Intake 2496 ml   Output 4200 ml   Net -1704 ml       TELEMETRY: Atrial fibrillation with mild RVR    Physical Exam:  General: Intubated, sedated, NAD  Skin:  Warm and dry  Neck:  JVD is difficult to assess  Chest:  decreased air exchange bilaterally  Cardiovascular: Irregularly irregular S1S2, no S3, no significant murmur  Abdomen:  Soft, ND, NT, No HSM  Extremities: Trace edema    Medications:    meropenem  1 g Intravenous Q8H    vancomycin  750 mg Intravenous Q12H    propranolol  20 mg Oral TID    famotidine (PEPCID) injection  20 mg Intravenous BID    lidocaine 1 % injection  5 mL Intradermal Once    sodium chloride flush  10 mL Intravenous 2 times per day    digoxin  125 mcg Intravenous Daily    topiramate  50 mg Oral BID    sodium chloride flush  10 mL Intravenous 2 times per day      fentaNYL (SUBLIMAZE)

## 2019-09-09 NOTE — CONSULTS
NEUROLOGY CONSULTATION     Patient's Name :   Lisa Damico        YOB: 1940                    Date of Consultation : 9/9/2019 12:59 PM    Referring Physician :  Arvilla Dubin, MD    Reason for Consultation :  Altered mental status    HISTORY OF PRESENT ILLNESS      Claribel Alexander is a 67 y.o. female   History was obtained from the patient's nurse in the intensive care unit as well as from review of the dictations in the chart. Patient has history of chronic atrial fibrillation hypertension COPD and dementia. She came to the hospital with acute abdominal pain. She was found to be septic and had acute cholecystitis and respiratory failure. She underwent cholecystectomy. She was intubated and is in the intensive care unit. On admission her INR was subtherapeutic. Patient sedation was stopped earlier today. Patient is still very lethargic and there was concern that she had a severe encephalopathy. REVIEW OF SYSTEMS     Unable to obtain due to patient's neurological status. Past Medical History:   Diagnosis Date    Arthritis     BACK    Asthma     Hypertension     Migraine      Family History   Problem Relation Age of Onset    Heart Disease Mother     Cancer Father         kidney     Social History     Socioeconomic History    Marital status:       Spouse name: None    Number of children: None    Years of education: None    Highest education level: None   Occupational History    None   Social Needs    Financial resource strain: None    Food insecurity:     Worry: None     Inability: None    Transportation needs:     Medical: None     Non-medical: None   Tobacco Use    Smoking status: Former Smoker    Smokeless tobacco: Never Used    Tobacco comment: [id number] years   Substance and Sexual Activity    Alcohol use: No    Drug use: None    Sexual activity: None   Lifestyle    Physical activity: Fede Wilkinson MD   20 mg at 09/09/19 0816    sodium chloride flush 0.9 % injection 10 mL  10 mL Intravenous 2 times per day Fede Wilkinson MD   10 mL at 09/09/19 0827    sodium chloride flush 0.9 % injection 10 mL  10 mL Intravenous PRN Fede Wilkinson MD        digoxin [facility]) injection 125 mcg  125 mcg Intravenous Daily Joshua Vazquez MD   125 mcg at 09/09/19 0816    haloperidol lactate (HALDOL) injection 3 mg  3 mg Intravenous Q4H PRN Saadia Urbina MD   3 mg at 09/01/19 1021    potassium chloride 20 mEq/50 mL IVPB (Central Line)  20 mEq Intravenous PRN Brian Peguero MD 50 mL/hr at 09/03/19 0846 20 mEq at 09/03/19 0846    magnesium sulfate 1 g in dextrose 5% 100 mL IVPB  1 g Intravenous PRN Brian Peguero MD        propofol injection  10 mcg/kg/min Intravenous Titrated Fede Wilkinson MD   Stopped at 09/09/19 0800    morphine injection 4 mg  4 mg Intravenous Q30 Min PRN Eliana Rae APRN - CNP   4 mg at 08/30/19 1703    topiramate (TOPAMAX) tablet 50 mg  50 mg Oral BID Natalie Bryant MD   50 mg at 09/09/19 0819    sodium chloride flush 0.9 % injection 10 mL  10 mL Intravenous 2 times per day Brian Peguero MD   10 mL at 09/09/19 0828    magnesium hydroxide (MILK OF MAGNESIA) 400 MG/5ML suspension 30 mL  30 mL Oral Daily PRN Brian Peguero MD        ondansetron (ZOFRAN) injection 4 mg  4 mg Intravenous Q6H PRN Brian Peguero MD        acetaminophen (TYLENOL) tablet 650 mg  650 mg Oral Q4H PRN Brian Peguero MD   650 mg at 09/08/19 1112     Allergies   Allergen Reactions    Alendronate Sodium      Other reaction(s): jaw pain---concern 4 thad necro, jaw pain---concern 4 thad necro (mild to moderate)    Medroxyprogesterone Acetate      Other reaction(s): depressed mood, depressed mood (mild to moderate)    Acetaminophen     Formoterol Fumarate      Other reaction(s): dry mouth    Lactose Intolerance (Gi)      pts gets diarrhea with dairy made to ensure the accuracy of this automated transcription, some errors in transcription may have occurred.

## 2019-09-09 NOTE — PROGRESS NOTES
261 305     Recent Labs     09/07/19  0513 09/08/19  0540 09/09/19  0623    141 139   K 4.1 4.2 4.3    106 104   CO2 26 25 26   BUN 13 17 18   CREATININE 0.7 0.7 0.6   CALCIUM 8.2* 8.2* 8.7   PHOS 3.0 3.3 3.0     Recent Labs     09/07/19  0513 09/08/19  0540 09/09/19  0623   AST 27 23 22   ALT 25 19 16   BILITOT 0.7 0.6 0.5   ALKPHOS 160* 162* 182*     Recent Labs     09/09/19  1303   INR 1.07     No results for input(s): CKTOTAL, TROPONINI in the last 72 hours. Assessment/Plan:    Active Hospital Problems    Diagnosis Date Noted    Atelectasis [J98.11]     Hypomagnesemia [E83.42] 09/06/2019    Fever [R50.9]     High fever [R50.9]     E coli bacteremia [R78.81]     Cholecystostomy care ([facility] [Z43.4]     Endotracheally intubated [Z97.8]     On mechanically assisted ventilation (HCC) [Z99.11]     Overweight [E66.3]     Chronic atrial fibrillation (HCC) [I48.2]     Permanent atrial fibrillation (HCC) [I48.2]     Acute abdomen [R10.0]     Septic shock due to Escherichia coli ([facility] [A41.51, R65.21]     Alveolar pneumonopathy ([address] [J84.09]     Right upper quadrant abdominal pain [R10.11]     Acute respiratory failure with hypoxia (HCC) [J96.01]     ARDS (adult respiratory distress syndrome) ([address] [J80]     Calculus of gallbladder with acute cholecystitis without obstruction [K80.00]     Sepsis ([address] [A41.9] 08/30/2019    Essential hypertension [I10] 10/07/2018    Atrial fibrillation with rapid ventricular response ([address] [I48.91] 09/06/2018    COPD (chronic obstructive pulmonary disease) ([facility] [J44.9] 02/05/2015    Hypertriglyceridemia [E78.1] 04/09/2013     Severe Sepsis due to Escherichia coli bacteremia in the setting of acute cholecystitis with possible cholangitis and choledocholithiasis  -Continues broad spectrum antibiotics per ID recs. On merrem  -follow-up cultures  - sepsis protocol     Fevers with partial consolidation in the lower lobes bilaterally. Questionble PNA.    -

## 2019-09-09 NOTE — PROGRESS NOTES
After two hours off sedation pt still not following commands, pt to be sedated with precedex. Neuro consult and head CT ordered.

## 2019-09-09 NOTE — PROGRESS NOTES
Peritoneum/Retroperitoneum: There is increased retroperitoneal fat stranding, along with stranding within the mesentery. Abdominal aorta unremarkable on these noncontrast scan images. Bones/Soft Tissues: Mild diffuse anasarca noted with subcutaneous edema, increased when compared to the previous exam.  Extra-abdominal soft tissues otherwise unremarkable. Expected appearance of the cholecystostomy drain, with no significant hematoma or edema along the extrahepatic course of the drain. The coil of the drain is located within the gallbladder lumen, and the gallbladder is now decompressed. Mildly hyperdense gallstones are again detected. Evidence of increasing anasarca, with increasing bilateral pleural effusions with adjacent collapsing consolidation, increasing mesenteric and retroperitoneal fat stranding, and with increasing subcutaneous fat stranding. Ct Abscess Drainage W Cath Placement S&i    Result Date: 8/31/2019  PROCEDURE: CT GUIDED PERCUTANEOUS CHOLECYSTOSTOMY DRAINAGE 8/31/2019 HISTORY: ORDERING SYSTEM PROVIDED HISTORY: cholecystis TECHNOLOGIST PROVIDED HISTORY: Reason for exam:->cholecystis Reason for Exam: cholecystis Acuity: Unknown Type of Exam: Ongoing TECHNIQUE: Dose modulation, iterative reconstruction, and/or weight based adjustment of the mA/kV was utilized to reduce the radiation dose to as low as reasonably achievable. CONTRAST: None. SEDATION: The patient was intubated and receiving intravenous propofol per ICU protocol. I did not supervise sedation. Intra service time was 20 minutes. FLUOROSCOPY DOSE AND TYPE OR TIME AND EXPOSURES: Total  mGy cm DESCRIPTION OF PROCEDURE: Informed consent was obtained after a detailed explanation of the procedure including risks, benefits, and alternatives. Universal protocol was observed. The patient was in supine position. Preliminary CT reveals mild distention of the gallbladder, with wall thickening and a stone dependently in the neck. limits the evaluation. Given that limitation, no focal hepatic abnormality is identified. There is new gallbladder distension, with evidence of gallstones at the gallbladder neck. Mild intrahepatic biliary dilation is present, and that appears new when compared to the previous exam.  There is also common duct dilation measuring up to 10 mm, which is new when compared to the previous exam (on the previous exam, the common duct measured 2-3 mm). No splenic abnormalities are seen. Adrenal calcifications are again noted, compatible with remote trauma or infection, requiring no further follow-up. The pancreas is atrophied but otherwise unremarkable. There is evidence of probable nephrolithiasis on the right. No hydronephrosis or hydroureter is seen. A ureteral calculus is not found. GI/Bowel: No large bowel abnormalities are identified. The appendix is not well visualized but no asymmetric pericecal inflammation is seen. There is a small inguinal hernia which contains a short knuckle of small bowel, but no evidence of obstruction is identified. The small bowel is otherwise unremarkable. The distal esophagus is patulous and fluid-filled, suggesting probable reflux. Mild mural thickening of the distal esophagus noted. Stomach is fluid-filled but otherwise unremarkable duodenal sweep unremarkable. Pelvis: Uterus is surgically absent. Urinary bladder unremarkable. Peritoneum/Retroperitoneum: The abdominal aorta is normal in caliber. Mild vascular calcifications are seen. The superior mesenteric artery is enhancing. No lymphadenopathy. Bones/Soft Tissues: No osteolytic or osteoblastic bone lesions are identified. No acute bony abnormalities are detected. Again, evaluation limited by motion artifact. Moderate motion degradation. The gallbladder is dilated, with cholelithiasis noted.   Common duct also is dilated, which is new when compared to the previous exam.  There is suspected mild pericholecystic edema, but motion is present. All-in-all, cholecystitis is a concern. Very small bilateral pleural effusions. Patulous fluid-filled distal esophagus. Correlate with clinical evidence of reflux and possible esophagitis. Nonobstructing right-sided nephrolithiasis. Small inguinal hernia contains several short knuckles of small bowel, but without evidence obstruction. Xr Chest Portable    Result Date: 9/5/2019  EXAMINATION: ONE XRAY VIEW OF THE CHEST 9/5/2019 10:03 am COMPARISON: September 3, 2019 HISTORY: ORDERING SYSTEM PROVIDED HISTORY: Acute Respiratory Failure and Fever TECHNOLOGIST PROVIDED HISTORY: Reason for exam:->Acute Respiratory Failure and Fever Reason for Exam: Acute Respiratory Failure and Fever Acuity: Unknown Type of Exam: Unknown FINDINGS: There is increased vascular congestion and perihilar/basilar interstitial opacity. Bibasilar airspace disease and bilateral pleural effusions are again noted, no significant change     Increased pulmonary edema. No significant change of bilateral pleural effusions and bibasilar airspace disease     Xr Chest Portable    Result Date: 9/3/2019  EXAMINATION: ONE XRAY VIEW OF THE CHEST 9/3/2019 3:04 pm COMPARISON: 09/02/2019 HISTORY: ORDERING SYSTEM PROVIDED HISTORY: PICC Placement - tip verification TECHNOLOGIST PROVIDED HISTORY: Reason for exam:->PICC Placement - tip verification Reason for exam:-> Reason for Exam: PICC Placement - tip verification - LEFT - A FIB Acuity: Acute Type of Exam: Ongoing FINDINGS: Nasogastric tube extends to the upper abdomen. Endotracheal tube is approximately 3 cm proximal to the julian. Heterogeneous bilateral pulmonary opacity is again demonstrated. Hazy opacity at the lung bases, not significantly changed. No pneumothorax. Cardiac and mediastinal silhouettes are unchanged. Calcification of aorta. Right upper quadrant catheter is again demonstrated.   Left upper extremity PICC line extends to the expected location of superior vena

## 2019-09-09 NOTE — PROGRESS NOTES
tenderness    Intake and output:    I/O last 3 completed shifts: In: 978 [I.V.:933]  Out: 3296 [Urine:2916; Drains:380]    Lab Data:   All available labs and old records have been reviewed by me. CBC:  Recent Labs     09/07/19 0513 09/08/19  0540 09/09/19  0623   WBC 9.1 9.0 8.3   RBC 3.45* 3.31* 3.26*   HGB 9.9* 9.6* 9.4*   HCT 30.4* 29.1* 28.8*    261 305   MCV 88.1 87.7 88.3   MCH 28.8 29.0 28.7   MCHC 32.7 33.1 32.5   RDW 15.8* 15.8* 15.4        BMP:  Recent Labs     09/07/19 0513 09/08/19  0540 09/09/19  0623    141 139   K 4.1 4.2 4.3    106 104   CO2 26 25 26   BUN 13 17 18   CREATININE 0.7 0.7 0.6   CALCIUM 8.2* 8.2* 8.7   GLUCOSE 137* 107* 98        Hepatic Function Panel:   Lab Results   Component Value Date    ALKPHOS 182 09/09/2019    ALT 16 09/09/2019    AST 22 09/09/2019    PROT 5.9 09/09/2019    BILITOT 0.5 09/09/2019    BILIDIR 2.7 08/31/2019    IBILI -0.2 08/31/2019    LABALBU 2.4 09/09/2019       CPK: No results found for: CKTOTAL  ESR: No results found for: SEDRATE  CRP: No results found for: CRP        Imaging: All pertinent images and reports for the current visit were reviewed by me during this visit. XR CHEST PORTABLE   Final Result   Improved aeration to the lung bases with still likely a small left pleural   effusion and some left basilar atelectasis or infiltrate. CT CHEST ABDOMEN PELVIS W CONTRAST   Final Result   Small to moderate left and small right pleural effusions with partial   consolidation in the lower lobes. Findings may be related to infection or   atelectasis. Mild soft tissue edema in the abdomen and pelvis as well as small volume   ascites in the pelvis. External drain terminating in the gallbladder. No biliary dilatation. XR CHEST PORTABLE   Final Result   Increased pulmonary edema.       No significant change of bilateral pleural effusions and bibasilar airspace   disease         XR CHEST PORTABLE   Final Result Bilateral edema or pneumonia, similar to prior, with small bilateral pleural   effusions. Support apparatus as above. XR CHEST PORTABLE   Final Result   Findings suggestive of worsening pleural-parenchymal disease when compared to   the study of 9/1/2019 as described above. XR CHEST 1 VW   Final Result   1. Tip of the endotracheal tube is above the julian. Additional support   hardware, as detailed above. 2. Enlarged cardiomediastinal silhouette with findings suggestive of   pulmonary interstitial edema. Correlation with volume status is recommended. CT ABDOMEN PELVIS WO CONTRAST Additional Contrast? None   Final Result   Expected appearance of the cholecystostomy drain, with no significant   hematoma or edema along the extrahepatic course of the drain. The coil of   the drain is located within the gallbladder lumen, and the gallbladder is now   decompressed. Mildly hyperdense gallstones are again detected. Evidence of increasing anasarca, with increasing bilateral pleural effusions   with adjacent collapsing consolidation, increasing mesenteric and   retroperitoneal fat stranding, and with increasing subcutaneous fat stranding. CT ABSCESS DRAINAGE W CATH PLACEMENT S&I   Final Result   Findings consistent with acute, calculous cholecystitis. Successful percutaneous cholecystostomy tube by transhepatic approach. XR CHEST PORTABLE   Final Result   Bilateral airspace disease, increased in the left retrocardiac region         XR CHEST PORTABLE   Final Result   Endotracheal tube is in the expected position. Nasogastric tube appears to be within the distal esophagus. That should be   advanced approximately 12 additional cm. Location of this was discussed with   Dr. Eugenio Wilder at 8:20 p.m. on 08/30/2019. Pulmonary vascular congestion with patchy bilateral airspace disease,   concerning for pulmonary edema.          XR CHEST PORTABLE   Final Result Leigha Villarreal

## 2019-09-09 NOTE — PROGRESS NOTES
[facility] Pulmonary/CCM Progress note      Admit Date: 8/30/2019    Chief Complaint: Abdominal pain with nausea    Subjective: Interval History: Patient remains intubated, not following commands. Working well on spontaneous breathing trial, though somewhat tachypneic. Still has low-grade fevers of up to 100 point 3F. Hemodynamically stable. Scheduled Meds:   enoxaparin  70 mg Subcutaneous BID    metoprolol tartrate  50 mg Oral BID    warfarin (COUMADIN) daily dosing (placeholder)   Other RX Placeholder    meropenem  1 g Intravenous Q8H    famotidine (PEPCID) injection  20 mg Intravenous BID    sodium chloride flush  10 mL Intravenous 2 times per day    digoxin  125 mcg Intravenous Daily    topiramate  50 mg Oral BID    sodium chloride flush  10 mL Intravenous 2 times per day     Continuous Infusions:   dexmedetomidine (PRECEDEX) IV infusion 0.6 mcg/kg/hr (09/09/19 1312)    fentaNYL (SUBLIMAZE) infusion Stopped (09/09/19 0800)    propofol Stopped (09/09/19 0800)     PRN Meds:albuterol sulfate HFA, ipratropium, sodium chloride flush, haloperidol lactate, potassium chloride, magnesium sulfate, morphine, magnesium hydroxide, ondansetron, acetaminophen    Review of Systems  Unable to obtain since the patient is currently intubated    Objective:     Patient Vitals for the past 8 hrs:   BP Temp Temp src Pulse Resp SpO2   09/09/19 1520 -- -- -- 95 30 100 %   09/09/19 1500 (!) 160/81 -- -- 89 25 100 %   09/09/19 1400 (!) 160/69 -- -- 87 23 99 %   09/09/19 1300 (!) 154/68 -- -- 95 30 99 %   09/09/19 1200 (!) 158/79 100.8 °F (38.2 °C) CORE 127 (!) 32 97 %   09/09/19 1125 -- -- -- -- 16 --   09/09/19 1100 (!) 173/111 -- -- 116 30 100 %   09/09/19 1000 (!) 182/64 -- -- 123 28 98 %     I/O last 3 completed shifts:   In: 306 [I.V.:306]  Out: 3030 [Urine:2850; Drains:180]  I/O this shift:  In: -   Out: 1200 [Urine:900; Emesis/NG output:200; Drains:100]    General Appearance: Intubated and poorly responsive, in no

## 2019-09-10 NOTE — PROGRESS NOTES
Placed on SBT by RT. Tolerating well at this time, RR 20's. VSS. Pt opened eyes slightly to verbal stimuli, still unable to follow commands.  Dimitri Wood

## 2019-09-10 NOTE — PROGRESS NOTES
[id number], [id number], THYROIDAB    LFTS:   Lab Results   Component Value Date    ALT 14 09/10/2019    AST 21 09/10/2019    ALKPHOS 190 09/10/2019    PROT 6.1 09/10/2019    AGRATIO 0.7 09/10/2019    BILITOT 0.6 09/10/2019     Cardiac Enzymes:   Lab Results   Component Value Date    TROPONINI <0.01 2019    TROPONINI <0.01 2019    TROPONINI <0.01 2019     Coags:   Lab Results   Component Value Date    PROTIME 12.7 09/10/2019    PROTIME 29.2 2018    INR 1.11 09/10/2019     EC19  AF RVR, rate 148, QRS 74, QTc 486    ECHO: 2018  Normal left ventricle size, wall thickness and systolic function with an EF of 55%. Mild aortic regurgitation.   Moderate mitral regurgitation. Mild tricuspid regurgitation with a estimated RVSP of 36 mmHg. Cath: None    CT Head: 19  Motion limited examination, demonstrating atrophy and small vessel ischemic disease. Persistent left mastoid effusion.   Redemonstration of mass involving the foramen ovale extending to the left  parapharyngeal space.  Some considerations include schwannoma or neurofibroma.     Problem List:   Patient Active Problem List    Diagnosis Date Noted    Acute encephalopathy     Atelectasis     Hypomagnesemia 2019    Fever     High fever     E coli bacteremia     [facility]olecystostomy care ([facility] [address]     Endotracheally intubated     On mechanically assisted ventilation (HCC)     Overweight     Chronic atrial fibrillation (HCC)     Permanent atrial fibrillation (HCC)     Acute abdomen     Agitation     Common bile duct dilatation     Septic shock due to Escherichia coli (HCC)     Septic shock (HCC)     Alveolar pneumonopathy (HCC)     Right upper quadrant abdominal pain     Acute respiratory failure with hypoxia (HCC)     ARDS (adult respiratory distress syndrome) (HCC)     Calculus of gallbladder with acute cholecystitis without obstruction     Sepsis ([facility] [address] 2019    Essential hypertension 10/07/2018    Atrial

## 2019-09-10 NOTE — PROGRESS NOTES
Infectious Diseases   Progress Note      Admission Date: 8/30/2019  Hospital Day: Hospital Day: 15 .cur  Attending: Quirino Medel MD  Date of service: 9/3/19     Chief complaint/ Reason for consult: The patient was seen today for the following:    · Septic shock on admission  · E. coli bacteremia  · Acute cholecystitis with cholelithiasis with common bile duct dilatation status post cholecystostomy by IR  · Transaminitis    Microbiology:        I have reviewed all available micro lab data and cultures    · Blood culture (2/2) - collected on 8/30/2019: E. coli    Escherichia coli (2)     Antibiotic Interpretation RICHAR Status    ampicillin Sensitive 8 mcg/mL     ceFAZolin Sensitive <=4 mcg/mL     cefepime Sensitive <=0.12 mcg/mL     cefTRIAXone Sensitive <=0.25 mcg/mL     ciprofloxacin Sensitive <=0.25 mcg/mL     gentamicin Sensitive <=1 mcg/mL     levofloxacin Sensitive <=0.12 mcg/mL     piperacillin-tazobactam Sensitive <=4 mcg/mL     trimethoprim-sulfamethoxazole Sensitive <=20 mcg/mL           Antibiotics and immunizations:       Current antibiotics: All antibiotics and their doses were reviewed by me    Recent Abx Admin                   meropenem (MERREM) 1 g in sodium chloride 0.9 % 100 mL IVPB (mini-bag) (g) 1 g New Bag 09/10/19 1158     1 g New Bag  0521     1 g New Bag 09/09/19 2043                  Immunization History: All immunization history was reviewed by me today. Immunization History   Administered Date(s) Administered    Influenza Virus Vaccine 10/15/2014    Influenza Whole 10/01/2012    Pneumococcal Conjugate 13-valent ([id number]) 02/05/2015    Pneumococcal Polysaccharide ([id number]) 10/15/2012       Known drug allergies:      All allergies were reviewed and updated    Allergies   Allergen Reactions    Alendronate Sodium      Other reaction(s): jaw pain---concern 4 thad necro, jaw pain---concern 4 thad necro (mild to moderate)    Medroxyprogesterone Acetate      Other not possible  · Will start IV ciprofloxacin 400 mg every 12 hour again  · Start IV Flagyl 500 mg every 8 hour  · Continue to monitor her vitals closely  · Start probiotics twice daily  · Cholecystostomy drain site care  · Endotracheal tube care and pulmonary toilet  · Pressure ulcer prevention precautions  · Continue to watch for diarrhea  · DVT prophylaxis  · Discussed all above with medical ICU team      Drug Monitoring:    · Continue monitoring for antibiotic toxicity as follows: CMP  · Continue to watch for following: new or worsening fever, new hypotension, hives, lip swelling and redness or purulence at vascular access sites. I/v access Management:    · Continue to monitor i.v access sites for erythema, induration, discharge or tenderness. · As always, continue efforts to minimize tubes/lines/drains as clinically appropriate to reduce chances of line associated infections. Risk of Complications/Morbidity: High     TIME SPENT TODAY:     - Spent over  35  minutes on visit (including interval history, physical exam, review of data including labs, cultures, imaging, development and implementation of treatment plan and coordination of complex care). - Over 50% of time spent with patient face to face on counseling and education. Thank you for involving me in the care of your patient. I will continue to follow. If you have anyadditional questions, please do not hesitate to contact me. Subjective: Interval history: Patient was seen and examined at bedside. Interval history was obtained. The patient remains intubated and is on a ventilator. Fever curve is coming down. No diarrhea. REVIEW OF SYSTEMS:      Review of Systems   Unable to perform ROS: Intubated         Past Medical History: All past medical history reviewed today.     Past Medical History:   Diagnosis Date    Arthritis     BACK    Asthma     Hypertension     Migraine        Past Surgical History: All past surgical history [id number]:932]  Out: 200 [Urine:2500; Emesis/NG output:500; Drains:180]    Lab Data:   All available labs and old records have been reviewed by me. CBC:  Recent Labs     09/08/19  0540 09/09/19  0623 09/10/19  0525   WBC 9.0 8.3 7.5   RBC 3.31* 3.26* 3.28*   HGB 9.6* 9.4* 9.4*   HCT 29.1* 28.8* 28.9*    305 327   MCV 87.7 88.3 87.9   MCH 29.0 28.7 28.6   MCHC 33.1 32.5 32.6   RDW 15.8* 15.4 14.9        BMP:  Recent Labs     09/08/19  0540 09/09/19  0623 09/10/19  0525    139 144   K 4.2 4.3 3.9    104 109   CO2 25 26 24   BUN 17 18 22*   CREATININE 0.7 0.6 0.6   CALCIUM 8.2* 8.7 8.7   GLUCOSE 107* 98 121*        Hepatic Function Panel:   Lab Results   Component Value Date    ALKPHOS 190 09/10/2019    ALT 14 09/10/2019    AST 21 09/10/2019    PROT 6.1 09/10/2019    BILITOT 0.6 09/10/2019    BILIDIR 2.7 08/31/2019    IBILI -0.2 08/31/2019    LABALBU 2.6 09/10/2019       CPK: No results found for: CKTOTAL  ESR: No results found for: SEDRATE  CRP: No results found for: CRP        Imaging: All pertinent images and reports for the current visit were reviewed by me during this visit. CT HEAD WO CONTRAST   Final Result   Motion limited examination, demonstrating atrophy and small vessel ischemic   disease. Persistent left mastoid effusion. Redemonstration of mass involving the foramen ovale extending to the left   parapharyngeal space. Some considerations include schwannoma or neurofibroma. XR CHEST PORTABLE   Final Result   Improved aeration to the lung bases with still likely a small left pleural   effusion and some left basilar atelectasis or infiltrate. CT CHEST ABDOMEN PELVIS W CONTRAST   Final Result   Small to moderate left and small right pleural effusions with partial   consolidation in the lower lobes. Findings may be related to infection or   atelectasis. Mild soft tissue edema in the abdomen and pelvis as well as small volume   ascites in the pelvis. be   advanced approximately 12 additional cm. Location of this was discussed with   Dr. Jessica Sanchez at 8:20 p.m. on 08/30/2019. Pulmonary vascular congestion with patchy bilateral airspace disease,   concerning for pulmonary edema. XR CHEST PORTABLE   Final Result   Mild cardiomegaly. Patchy airspace opacities bilaterally, may be related to pulmonary edema   versus pneumonia. Mild left pleural effusion. CT ABDOMEN PELVIS W IV CONTRAST Additional Contrast? None   Final Result   Moderate motion degradation. The gallbladder is dilated, with cholelithiasis noted. Common duct also is   dilated, which is new when compared to the previous exam.  There is suspected   mild pericholecystic edema, but motion is present. All-in-all, cholecystitis   is a concern. Very small bilateral pleural effusions. Patulous fluid-filled distal esophagus. Correlate with clinical evidence of   reflux and possible esophagitis. Nonobstructing right-sided nephrolithiasis. Small inguinal hernia contains several short knuckles of small bowel, but   without evidence obstruction. Medications: All current and past medications were reviewed.      [START ON 9/19/2019] fat emulsion  250 mL Intravenous Once per day on Mon Thu    insulin lispro  0-6 Units Subcutaneous Q4H    enoxaparin  70 mg Subcutaneous BID    metoprolol tartrate  50 mg Oral BID    warfarin (COUMADIN) daily dosing (placeholder)   Other RX Placeholder    ciprofloxacin  1 drop Right Eye 4 times per day    meropenem  1 g Intravenous Q8H    famotidine (PEPCID) injection  20 mg Intravenous BID    sodium chloride flush  10 mL Intravenous 2 times per day    digoxin  125 mcg Intravenous Daily    topiramate  50 mg Oral BID    sodium chloride flush  10 mL Intravenous 2 times per day        sodium chloride 100 mL/hr at 09/10/19 1225    PN-Adult Premix 5/20 - Standard Electrolytes - Central Line      dextrose      dexmedetomidine (PRECEDEX) IV infusion 0.5 mcg/kg/hr (09/10/19 1512)    fentaNYL (SUBLIMAZE) infusion Stopped (09/09/19 0800)    propofol Stopped (09/09/19 0800)       glucose, dextrose, glucagon (rDNA), dextrose, albuterol sulfate HFA, ipratropium, sodium chloride flush, haloperidol lactate, potassium chloride, magnesium sulfate, morphine, magnesium hydroxide, ondansetron, acetaminophen      Problem list:       Patient Active Problem List   Diagnosis Code    Rena M20.40    Migraine G43.909    Hypertriglyceridemia E78.1    Asthma J45.909    Chronic back pain M54.9, G89.29    Respiratory failure with hypercapnia ([facility] [address] J96.92    Hiatal hernia K44.9    Emphysema lung ([facility] [address] J43.9    COPD (chronic obstructive pulmonary disease) ([facility]) J44.9    Mass of right forearm R22.31    Otalgia of both ears H92.03    Atrial fibrillation with rapid ventricular response ([facility]) I48.91    Essential hypertension I10    Sepsis ([facility]) A41.9    Right upper quadrant abdominal pain R10.11    Acute respiratory failure with hypoxia ([facility]) J96.01    ARDS (adult respiratory distress syndrome) ([facility]) J80    Calculus of gallbladder with acute cholecystitis without obstruction K80.00    Acute abdomen R10.0    Agitation R45.1    Common bile duct dilatation K83.8    Septic shock due to Escherichia coli ([facility]) A41.51, R65.21    Septic shock ([facility]) A41.9, R65.21    Alveolar pneumonopathy ([facility]) J84.09    E coli bacteremia R78.81    Cholecystostomy care ([facility] [address] Z43.4    Endotracheally intubated Z97.8    On mechanically assisted ventilation ([facility]) Z99.11    Overweight E66.3    Chronic atrial fibrillation ([facility]) I48.2    Permanent atrial fibrillation ([facility]) I48.2    High fever R50.9    Hypomagnesemia E83.42    Fever R50.9    Atelectasis J98.11    Acute encephalopathy G93.40       Please note that this chart was generated using Dragon dictation software.  Although every effort was made to ensure the accuracy of this automated

## 2019-09-10 NOTE — PROGRESS NOTES
Talks on phone: None     Gets together: None     Attends Nondenominational service: None     Active member of club or organization: None     Attends meetings of clubs or organizations: None     Relationship status: None    Intimate partner violence:     Fear of current or ex partner: None     Emotionally abused: None     Physically abused: None     Forced sexual activity: None   Other Topics Concern    None   Social History Narrative    None        PHYSICAL EXAMINATION      BP (!) 121/55   Pulse 85   Temp 99.7 °F (37.6 °C) (Core)   Resp 20   Ht 5' 2\" (1.575 m)   Wt 140 lb 6.9 oz (63.7 kg)   SpO2 100%   BMI 25.69 kg/m²   This is a well-nourished patient in no acute distress  Patient is intubated and poorly responsive. Unable to cooperate for mental status testing. Pupils equal round reacting to light. Oculocephalic reflex were present. Corneal reflexes are present. Facial symmetry could not be determined due to intubation. Tongue is in the midline. Motor exam shows mild withdrawal response in the arms and legs. Tone is diminished. Deep tendon reflexes diminished. Plantar reflexes withdrawal.  Coordination could not be checked. Gait could not be checked. No carotid bruit. No neck stiffness.     DATA :  LABS:  General Labs:    CBC:   Lab Results   Component Value Date    WBC 7.5 09/10/2019    RBC 3.28 09/10/2019    HGB 9.4 09/10/2019    HCT 28.9 09/10/2019    MCV 87.9 09/10/2019    MCH 28.6 09/10/2019    MCHC 32.6 09/10/2019    RDW 14.9 09/10/2019     09/10/2019    MPV 8.9 09/10/2019     BMP:    Lab Results   Component Value Date     09/10/2019    K 3.9 09/10/2019     09/10/2019    CO2 24 09/10/2019    BUN 22 09/10/2019    LABALBU 2.6 09/10/2019    CREATININE 0.6 09/10/2019    CALCIUM 8.7 09/10/2019    GFRAA >60 09/10/2019    LABGLOM >60 09/10/2019    GLUCOSE 121 09/10/2019     RADIOLOGY REVIEW:  I have reviewed radiology image(s) and reports(s) of: CT scan of the head      IMPRESSION

## 2019-09-10 NOTE — PROGRESS NOTES
[facility]        Progress Note    Pt Name: Joan Edmondson  MRN: [id number]  YOB: 1940  Date of evaluation: 9/10/2019    Chief Complaint: Abdominal pain      Subjective:    No acute events overnight  Remains on ventilatory, has been too drowsy to wean despite being off sedation  TF remain on hold  Family at bedside      Vital Signs:  Patient Vitals for the past 24 hrs:   BP Temp Temp src Pulse Resp SpO2 Weight   09/10/19 1212 -- -- -- -- -- 100 % --   09/10/19 1200 (!) 162/55 -- -- 88 22 -- --   09/10/19 1124 (!) 118/53 99.1 °F (37.3 °C) CORE 86 18 100 % --   09/10/19 1100 (!) 118/53 -- -- 88 22 -- --   09/10/19 1000 (!) 127/52 -- -- 70 23 -- --   09/10/19 0900 (!) 120/57 -- -- 70 23 100 % --   09/10/19 0625 -- -- -- -- -- -- 140 lb 6.9 oz (63.7 kg)   09/10/19 0600 (!) 100/55 -- -- 70 26 100 % --   09/10/19 0538 -- -- -- 71 -- 100 % --   09/10/19 0500 (!) 120/52 -- -- 64 19 100 % --   09/10/19 0405 -- -- -- 68 24 100 % --   09/10/19 0400 (!) 105/49 98.7 °F (37.1 °C) CORE 67 18 100 % --   09/10/19 0333 115/60 -- -- 69 23 -- --   09/10/19 0300 (!) 84/45 -- -- 69 20 100 % --   09/10/19 0200 (!) 115/54 -- -- 59 18 100 % --   09/10/19 0104 (!) 101/49 -- -- 75 20 99 % --   09/10/19 0100 (!) 88/46 -- -- 72 19 99 % --   09/10/19 0000 (!) 103/53 99.1 °F (37.3 °C) CORE 75 29 99 % --   09/09/19 2341 -- -- -- 73 20 98 % --   09/09/19 2300 (!) 91/46 -- -- 82 22 98 % --   09/09/19 2200 (!) 107/54 -- -- 84 21 98 % --   09/09/19 2100 (!) 115/59 -- -- 82 22 99 % --   09/09/19 2030 (!) 151/75 100.4 °F (38 °C) CORE 92 30 97 % --   09/09/19 2025 -- -- -- 86 (!) 35 97 % --   09/09/19 1938 -- -- -- 97 (!) 35 97 % --   09/09/19 1900 (!) 156/70 -- -- 80 25 97 % --   09/09/19 1800 138/73 -- -- 85 26 93 % --   09/09/19 1700 (!) 161/79 -- -- 81 20 -- --   09/09/19 1600 (!) 153/67 100.3 °F (37.9 °C) CORE 91 22 100 % --   09/09/19 1520 -- -- -- 95 30 100 % --   09/09/19 1500 (!) 160/81 the left lower lobe. Bilateral airspace disease, increased in the left retrocardiac region     Xr Chest Portable    Result Date: 8/30/2019  EXAMINATION: ONE XRAY VIEW OF THE CHEST 8/30/2019 5:10 pm COMPARISON: None. HISTORY: ORDERING SYSTEM PROVIDED HISTORY: et placement TECHNOLOGIST PROVIDED HISTORY: Reason for exam:->et placement Reason for Exam: et placement and ng placement Acuity: Acute Type of Exam: Initial FINDINGS: The tip of the endotracheal tube is in the expected position, superimposed approximately 3 cm above the julian. The nasogastric tube tip and side port are superimposed over the distal esophagus. That tube should be advanced approximately 12 additional cm prior to use. The pulmonary vasculature is congested, with patchy airspace disease bilaterally. No pneumothorax is found. No free air is seen. No acute bony abnormality. Endotracheal tube is in the expected position. Nasogastric tube appears to be within the distal esophagus. That should be advanced approximately 12 additional cm. Location of this was discussed with Dr. Iveth Gallardo at 8:20 p.m. on 08/30/2019. Pulmonary vascular congestion with patchy bilateral airspace disease, concerning for pulmonary edema. Xr Chest Portable    Result Date: 8/30/2019  EXAMINATION: ONE XRAY VIEW OF THE CHEST 8/30/2019 6:30 pm COMPARISON: August 26, 2015 HISTORY: ORDERING SYSTEM PROVIDED HISTORY: pain or SOB TECHNOLOGIST PROVIDED HISTORY: Reason for exam:->pain or SOB Reason for Exam: sob Acuity: Unknown Type of Exam: Unknown FINDINGS: The cardiomediastinal silhouette is mildly enlarged. There are patchy airspace opacities bilaterally, may be related to pulmonary edema versus pneumonia. There is mild left pleural effusion. There is no pneumothorax. There is no acute osseous abnormality. Mild cardiomegaly. Patchy airspace opacities bilaterally, may be related to pulmonary edema versus pneumonia. Mild left pleural effusion.      Xr Chest 1 Vw    Result Date: 9/2/2019  EXAMINATION: ONE XRAY VIEW OF THE CHEST 9/1/2019 7:45 am COMPARISON: August 31, 2019. HISTORY: ORDERING SYSTEM PROVIDED HISTORY: intubated TECHNOLOGIST PROVIDED HISTORY: Reason for exam:->intubated Reason for Exam: Intubated Acuity: Unknown Type of Exam: Unknown FINDINGS: Tip of the endotracheal tube is above the julian. Nasogastric tube tip projects over distal stomach. Right-sided catheter is noted within right mid abdomen. Cardiac and mediastinal contours enlarged but unchanged. Unchanged perihilar markings with prominence of interstitial lung markings. Trace bilateral pleural effusions. No evidence of pneumothorax. No evidence of new osseous abnormalities. 1. Tip of the endotracheal tube is above the julian. Additional support hardware, as detailed above. 2. Enlarged cardiomediastinal silhouette with findings suggestive of pulmonary interstitial edema. Correlation with volume status is recommended. Ct Chest Abdomen Pelvis W Contrast    Result Date: 9/6/2019  EXAMINATION: CT OF THE CHEST, ABDOMEN, AND PELVIS WITH CONTRAST 9/6/2019 4:45 pm TECHNIQUE: CT of the chest, abdomen and pelvis was performed with the administration of intravenous contrast. Multiplanar reformatted images are provided for review. Dose modulation, iterative reconstruction, and/or weight based adjustment of the mA/kV was utilized to reduce the radiation dose to as low as reasonably achievable. COMPARISON: CT abdomen August 31, 2019 CT chest January 21, 2015 HISTORY: ORDERING SYSTEM PROVIDED HISTORY: Fever TECHNOLOGIST PROVIDED HISTORY: Additional Contrast?->Oral Reason for Exam: Fever Acuity: Acute Type of Exam: Initial FINDINGS: Chest: Mediastinum: Endotracheal tube in place. No thoracic aortic aneurysm. Coronary artery calcifications. Left ventricle and left atrium are enlarged. No pericardial effusion. No adenopathy. Lungs/pleura: No pneumothorax.   Small to moderate left and small right pleural

## 2019-09-10 NOTE — PROGRESS NOTES
09/08/19  0540 09/09/19  0623 09/10/19  0525   WBC 9.0 8.3 7.5   HGB 9.6* 9.4* 9.4*   HCT 29.1* 28.8* 28.9*    305 327     Recent Labs     09/08/19  0540 09/09/19  0623 09/10/19  0525    139 144   K 4.2 4.3 3.9    104 109   CO2 25 26 24   BUN 17 18 22*   CREATININE 0.7 0.6 0.6   CALCIUM 8.2* 8.7 8.7   PHOS 3.3 3.0 3.2     Recent Labs     09/08/19  0540 09/09/19  0623 09/10/19  0525   AST 23 22 21   ALT 19 16 14   BILITOT 0.6 0.5 0.6   ALKPHOS 162* 182* 190*     Recent Labs     09/09/19  1303 09/10/19  0525   INR 1.07 1.11     No results for input(s): [place] Sink in the last 72 hours.     Assessment/Plan:    Active Hospital Problems    Diagnosis Date Noted    Acute encephalopathy [G93.40]     Atelectasis [J98.11]     Hypomagnesemia [E83.42] 09/06/2019    Fever [R50.9]     High fever [R50.9]     E coli bacteremia [R78.81]     [facility]olecystostomy care ([facility] [Z43.4]     Endotracheally intubated [Z97.8]     On mechanically assisted ventilation (HCC) [Z99.11]     Overweight [E66.3]     Chronic atrial fibrillation (HCC) [I48.2]     Permanent atrial fibrillation (HCC) [I48.2]     Acute abdomen [R10.0]     Septic shock due to Escherichia coli ([address] [A41.51, R65.21]     Alveolar pneumonopathy ([address] [J84.09]     Right upper quadrant abdominal pain [R10.11]     Acute respiratory failure with hypoxia (HCC) [J96.01]     ARDS (adult respiratory distress syndrome) ([address] [J80]     Calculus of gallbladder with acute cholecystitis without obstruction [K80.00]     Sepsis ([address] [A41.9] 08/30/2019    Essential hypertension [I10] 10/07/2018    Atrial fibrillation with rapid ventricular response ([facility] [I48.91] 09/06/2018    COPD (chronic obstructive pulmonary disease) ([facility] [J44.9] 02/05/2015    Hypertriglyceridemia [E78.1] 04/09/2013     Severe Sepsis due to Escherichia coli bacteremia in the setting of acute cholecystitis with possible cholangitis and choledocholithiasis  -Continues broad

## 2019-09-11 NOTE — PROGRESS NOTES
Hospitalist Progress Note      PCP: Mariposa Rodrigez    Date of Admission: 8/30/2019    Chief Complaint: Abdominal pain      Subjective: Intubated. Alert off sedation, but not following commands. Weaning vent. Discussed with family at bedside.     Medications:  Reviewed    Infusion Medications    PN-Adult Premix 5/20 - Standard Electrolytes - Central Line      sodium chloride 75 mL/hr at 09/11/19 1058    PN-Adult Premix 5/20 - Standard Electrolytes - Central Line 25 mL/hr at 09/10/19 1822    dextrose      dexmedetomidine (PRECEDEX) IV infusion 0.2 mcg/kg/hr (09/11/19 1230)    fentaNYL (SUBLIMAZE) infusion Stopped (09/09/19 0800)    propofol Stopped (09/09/19 0800)     Scheduled Medications    scopolamine  1 patch Transdermal Q72H    potassium phosphate IVPB  20 mmol Intravenous Once    oxyCODONE  5 mg Oral TID    methylPREDNISolone  40 mg Intravenous Q6H    [START ON 9/19/2019] fat emulsion  250 mL Intravenous Once per day on Mon Thu    insulin lispro  0-6 Units Subcutaneous Q4H    ciprofloxacin  400 mg Intravenous Q12H    metroNIDAZOLE  500 mg Intravenous Q8H    lactobacillus  1 capsule Oral BID WC    enoxaparin  70 mg Subcutaneous BID    metoprolol tartrate  50 mg Oral BID    warfarin (COUMADIN) daily dosing (placeholder)   Other RX Placeholder    ciprofloxacin  1 drop Right Eye 4 times per day    famotidine (PEPCID) injection  20 mg Intravenous BID    sodium chloride flush  10 mL Intravenous 2 times per day    topiramate  50 mg Oral BID    sodium chloride flush  10 mL Intravenous 2 times per day     PRN Meds: glucose, dextrose, glucagon (rDNA), dextrose, albuterol sulfate HFA, ipratropium, sodium chloride flush, haloperidol lactate, potassium chloride, magnesium sulfate, morphine, magnesium hydroxide, ondansetron, acetaminophen      Intake/Output Summary (Last 24 hours) at 9/11/2019 1448  Last data filed at 9/11/2019 0600  Gross per 24 hour   Intake 2231 ml   Output 1200

## 2019-09-11 NOTE — PROGRESS NOTES
Reassessment completed, see doc flowsheet for details. AFIB. Precedex drip infusing. Family at bedside. Pt able to open eyes spontaneously and move head, does not follow commands and does not make eye contact. Tolerating spontaneous vent setting. Repositioned for comfort. Oral/Sandy care provided. Fall and safety precautions in place.  Jessie Garcia, RYLANDN, RN

## 2019-09-11 NOTE — PROGRESS NOTES
Anemia ?   - H/H dropped to 8.0/24.5 this AM   - No obvious signs of bleeding: OG drainage, stool, or YANICK drainage   - Repeat H/H to verify    EF of 13%  ACEi for systolic HF: N/A  ASA and Statin for CAD: N/A  Anticoagulation for AF and heart failure: Yes (Coumadin)  Tobacco use was discussed with the patient and education provided: N/A    All pertinent information and plan of care discussed with the EP physician. All questions and concerns were addressed to the patient's daughter. Alternatives to my treatment were discussed. I have discussed the above stated plan and the patient's daughter verbalized understanding and agreed with the plan. Discussed plan with nurse. Thank you for allowing to us to participate in the care of Jovani Plummer.     HALEY Burch-JASON  [facility] [address]   Office: [phone]

## 2019-09-11 NOTE — PROGRESS NOTES
[facility] Pulmonary/CCM Progress note      Admit Date: 8/30/2019    Chief Complaint: Abdominal pain with nausea    Subjective: Interval History: Not following commands but appears to be more alert and cooperative today. Hemodynamically stable. Low-grade fevers.     Scheduled Meds:   scopolamine  1 patch Transdermal Q72H    oxyCODONE  5 mg Oral TID    methylPREDNISolone  40 mg Intravenous Q6H    [START ON 9/19/2019] fat emulsion  250 mL Intravenous Once per day on Mon Thu    insulin lispro  0-6 Units Subcutaneous Q4H    ciprofloxacin  400 mg Intravenous Q12H    metroNIDAZOLE  500 mg Intravenous Q8H    lactobacillus  1 capsule Oral BID WC    enoxaparin  70 mg Subcutaneous BID    metoprolol tartrate  50 mg Oral BID    warfarin (COUMADIN) daily dosing (placeholder)   Other RX Placeholder    ciprofloxacin  1 drop Right Eye 4 times per day    famotidine (PEPCID) injection  20 mg Intravenous BID    sodium chloride flush  10 mL Intravenous 2 times per day    topiramate  50 mg Oral BID    sodium chloride flush  10 mL Intravenous 2 times per day     Continuous Infusions:   PN-Adult Premix 5/20 - Standard Electrolytes - Central Line      propofol 20 mcg/kg/min (09/11/19 1619)    sodium chloride 75 mL/hr at 09/11/19 1058    PN-Adult Premix 5/20 - Standard Electrolytes - Central Line 25 mL/hr at 09/10/19 1822    dextrose      dexmedetomidine (PRECEDEX) IV infusion Stopped (09/11/19 1622)    fentaNYL (SUBLIMAZE) infusion Stopped (09/09/19 0800)     PRN Meds:glucose, dextrose, glucagon (rDNA), dextrose, albuterol sulfate HFA, ipratropium, sodium chloride flush, haloperidol lactate, potassium chloride, magnesium sulfate, morphine, magnesium hydroxide, ondansetron, acetaminophen    Review of Systems  Unable to obtain since the patient is currently intubated    Objective:     Patient Vitals for the past 8 hrs:   BP Temp Temp src Pulse Resp SpO2   09/11/19 1600 (!) 154/65 100 °F (37.8 °C) CORE 89 25 96 %

## 2019-09-11 NOTE — PROGRESS NOTES
procedure including risks, benefits, and alternatives. Universal protocol was observed. The patient was in supine position. Preliminary CT reveals mild distention of the gallbladder, with wall thickening and a stone dependently in the neck. Also, a nasogastric tube is present with the tip at the gastric pylorus. A anterolateral approach was chosen. The initial approach was subcostal. The air was sterilely prepared and infiltrated with 1% lidocaine. After advancing the 5 Yi aspiration was dry. There was apparent tenting of the gallbladder wall. Therefore, a more lateral approach was chosen with a greater or amount of hepatic parenchyma. The adjacent lowest intercostal level was selected and similarly infiltrated with 1% lidocaine. A 5 Yi needle sheath was advanced incrementally to the level of the posterior gallbladder wall and into the gallbladder. Dark brown-purple fluid consistent with bile was aspirated. An 035 wire was coiled in the gallbladder, followed by an 8 [place] drain. It locked in position at the level the neck. The catheter was fixed to skin with a Revolution device. Externally, it was connected to a YANICK bulb. FINDINGS: Please see above     Findings consistent with acute, calculous cholecystitis. Successful percutaneous cholecystostomy tube by transhepatic approach. Ct Abdomen Pelvis W Iv Contrast Additional Contrast? None    Result Date: 8/30/2019  EXAMINATION: CT OF THE ABDOMEN AND PELVIS WITH CONTRAST 8/30/2019 2:24 pm TECHNIQUE: CT of the abdomen and pelvis was performed with the administration of intravenous contrast. Multiplanar reformatted images are provided for review. Dose modulation, iterative reconstruction, and/or weight based adjustment of the mA/kV was utilized to reduce the radiation dose to as low as reasonably achievable. COMPARISON: None.  HISTORY: ORDERING SYSTEM PROVIDED HISTORY: abd pain TECHNOLOGIST PROVIDED HISTORY: Additional Contrast?->None Reason for 9/2/2019  EXAMINATION: ONE XRAY VIEW OF THE CHEST 9/1/2019 7:45 am COMPARISON: August 31, 2019. HISTORY: ORDERING SYSTEM PROVIDED HISTORY: intubated TECHNOLOGIST PROVIDED HISTORY: Reason for exam:->intubated Reason for Exam: Intubated Acuity: Unknown Type of Exam: Unknown FINDINGS: Tip of the endotracheal tube is above the julian. Nasogastric tube tip projects over distal stomach. Right-sided catheter is noted within right mid abdomen. Cardiac and mediastinal contours enlarged but unchanged. Unchanged perihilar markings with prominence of interstitial lung markings. Trace bilateral pleural effusions. No evidence of pneumothorax. No evidence of new osseous abnormalities. 1. Tip of the endotracheal tube is above the julian. Additional support hardware, as detailed above. 2. Enlarged cardiomediastinal silhouette with findings suggestive of pulmonary interstitial edema. Correlation with volume status is recommended. Ct Chest Abdomen Pelvis W Contrast    Result Date: 9/6/2019  EXAMINATION: CT OF THE CHEST, ABDOMEN, AND PELVIS WITH CONTRAST 9/6/2019 4:45 pm TECHNIQUE: CT of the chest, abdomen and pelvis was performed with the administration of intravenous contrast. Multiplanar reformatted images are provided for review. Dose modulation, iterative reconstruction, and/or weight based adjustment of the mA/kV was utilized to reduce the radiation dose to as low as reasonably achievable. COMPARISON: CT abdomen August 31, 2019 CT chest January 21, 2015 HISTORY: ORDERING SYSTEM PROVIDED HISTORY: Fever TECHNOLOGIST PROVIDED HISTORY: Additional Contrast?->Oral Reason for Exam: Fever Acuity: Acute Type of Exam: Initial FINDINGS: Chest: Mediastinum: Endotracheal tube in place. No thoracic aortic aneurysm. Coronary artery calcifications. Left ventricle and left atrium are enlarged. No pericardial effusion. No adenopathy. Lungs/pleura: No pneumothorax. Small to moderate left and small right pleural effusions.   Partial consolidation of the lower lobes. Soft Tissues/Bones: No acute bony abnormality. Abdomen/Pelvis: Organs: Liver, pancreas, and spleen are unremarkable. External biliary drain terminates in the gallbladder. No intrahepatic biliary dilatation. GI/Bowel: NG tube in the stomach. No bowel obstruction. Appendix is not visualized however no findings to suggest acute appendicitis. Pelvis: Fuller catheter in the bladder. Mild soft tissue edema. No pelvic fluid collection. Small volume ascites. No adenopathy. Peritoneum/Retroperitoneum: No abdominal aortic aneurysm. Adrenal glands are unremarkable. A few nonobstructing stones in the right kidney measuring up to 4 mm. Kidneys enhance symmetrically. Mild soft tissue edema. Bones/Soft Tissues: No acute bony abnormality. Small to moderate left and small right pleural effusions with partial consolidation in the lower lobes. Findings may be related to infection or atelectasis. Mild soft tissue edema in the abdomen and pelvis as well as small volume ascites in the pelvis. External drain terminating in the gallbladder. No biliary dilatation.        Scheduled Meds:   scopolamine  1 patch Transdermal Q72H    potassium phosphate IVPB  20 mmol Intravenous Once    [START ON 9/19/2019] fat emulsion  250 mL Intravenous Once per day on Mon Thu    insulin lispro  0-6 Units Subcutaneous Q4H    ciprofloxacin  400 mg Intravenous Q12H    metroNIDAZOLE  500 mg Intravenous Q8H    lactobacillus  1 capsule Oral BID WC    enoxaparin  70 mg Subcutaneous BID    metoprolol tartrate  50 mg Oral BID    warfarin (COUMADIN) daily dosing (placeholder)   Other RX Placeholder    ciprofloxacin  1 drop Right Eye 4 times per day    famotidine (PEPCID) injection  20 mg Intravenous BID    sodium chloride flush  10 mL Intravenous 2 times per day    topiramate  50 mg Oral BID    sodium chloride flush  10 mL Intravenous 2 times per day     Continuous Infusions:   PN-Adult Premix 5/20 -

## 2019-09-11 NOTE — PROGRESS NOTES
Infectious Diseases   Progress Note      Admission Date: 8/30/2019  Hospital Day: Hospital Day: 15 .cur  Attending: Lara Costa MD  Date of service: 9/3/19     Chief complaint/ Reason for consult: The patient was seen today for the following:    · Septic shock on admission  · E. coli bacteremia  · Acute cholecystitis with cholelithiasis with common bile duct dilatation status post cholecystostomy by IR  · Transaminitis    Microbiology:        I have reviewed all available micro lab data and cultures    · Blood culture (2/2) - collected on 8/30/2019: E. coli    Escherichia coli (2)     Antibiotic Interpretation RICHAR Status    ampicillin Sensitive 8 mcg/mL     ceFAZolin Sensitive <=4 mcg/mL     cefepime Sensitive <=0.12 mcg/mL     cefTRIAXone Sensitive <=0.25 mcg/mL     ciprofloxacin Sensitive <=0.25 mcg/mL     gentamicin Sensitive <=1 mcg/mL     levofloxacin Sensitive <=0.12 mcg/mL     piperacillin-tazobactam Sensitive <=4 mcg/mL     trimethoprim-sulfamethoxazole Sensitive <=20 mcg/mL           Antibiotics and immunizations:       Current antibiotics: All antibiotics and their doses were reviewed by me    Recent Abx Admin                   metronidazole (FLAGYL) 500 mg in NaCl 100 mL IVPB premix (mg) 500 mg New Bag 09/11/19 1057     500 mg New Bag  0208     500 mg New Bag 09/10/19 1819    ciprofloxacin (CIPRO) IVPB 400 mg (mg) 400 mg New Bag 09/11/19 0513     400 mg New Bag 09/10/19 1853                  Immunization History: All immunization history was reviewed by me today. Immunization History   Administered Date(s) Administered    Influenza Virus Vaccine 10/15/2014    Influenza Whole 10/01/2012    Pneumococcal Conjugate 13-valent ([id number]) 02/05/2015    Pneumococcal Polysaccharide ([id number]) 10/15/2012       Known drug allergies:      All allergies were reviewed and updated    Allergies   Allergen Reactions    Alendronate Sodium      Other reaction(s): jaw pain---concern 4 thad necro, jaw pain---concern 4 thad necro (mild to moderate)    Medroxyprogesterone Acetate      Other reaction(s): depressed mood, depressed mood (mild to moderate)    Acetaminophen     Formoterol Fumarate      Other reaction(s): dry mouth    Lactose Intolerance (Gi)      pts gets diarrhea with dairy products but Daughter states pt continues to drink milk and consume dairy products    Mometasone Furoate      Other reaction(s): dry mouth    Penicillins Hives     CAN TAKE KEFLEX  Other reaction(s): Rash    Procaine Hcl      Inc heart rate    Tiotropium      Other reaction(s): dry mouth         Assessment:     The patient is a 67 y.o. old female who  has a past medical history of Arthritis, Asthma, Atrial fibrillation ([address] Hypertension, and Migraine. with following problems:      · E. coli bacteremia-currently on IV ciprofloxacin and Flagyl  · Acute cholecystitis with cholelithiasis with common bile duct dilatation status post cholecystostomy by IR-cholecystostomy drain fluid cell cultures pending negative  · Transaminitis-resolved  · Acute respiratory failure with hypoxia-ongoing  · Endotracheally intubated  · Requiring invasive mechanical ventilation  -she remains intubated  · Overweight due to excess calorie intake : Body mass index is 26.94 kg/m². Discussion:      I had changed her IV antibiotics to IV ciprofloxacin and Flagyl yesterday. There was no evidence of ESBL. Cholecystostomy fluid cultures from 9/9/2019 are in process and are negative so far. She is tolerating antibiotic okay. The fever curve has been coming down, which is encouraging. White cell count is 5700.     Serum creatinine 0.6    Plan:     Diagnostic Workup:    · Continue to follow  fever curve, WBC count and blood cultures  · Follow up on liver and renal function    Antimicrobials:    · Will continue IV ciprofloxacin 400 mg every 12 hour  · Continue IV Flagyl 500 mg every 8 hours  · Cholecystostomy drain site care  · Endotracheal tube care and pulmonary toilet  · We will plan to continue antibiotics at least till Friday  · Continue spontaneous breathing trials. Plans for possible tracheostomy on Friday noted, if unable to extubate  · Pressure ulcer prevention precaution  · DVT prophylaxis  · Discussed all above with patient's sister at bedside    Drug Monitoring:    · Continue monitoring for antibiotic toxicity as follows: *CMP, QTc interval  · Continue to watch for following: new or worsening fever, new hypotension, hives, lip swelling and redness or purulence at vascular access sites. I/v access Management:    · Continue to monitor i.v access sites for erythema, induration, discharge or tenderness. · As always, continue efforts to minimize tubes/lines/drains as clinically appropriate to reduce chances of line associated infections. Risk of Complications/Morbidity: High       Thank you for involving me in the care of your patient. I will continue to follow. If you have anyadditional questions, please do not hesitate to contact me. Subjective: Interval history: Patient was seen and examined at bedside. Interval history was obtained. Fever curve is coming down. She is tolerating ciprofloxacin and Flagyl okay. Remains intubated. No diarrhea. REVIEW OF SYSTEMS:      Review of Systems   Unable to perform ROS: Intubated         Past Medical History: All past medical history reviewed today. Past Medical History:   Diagnosis Date    Arthritis     BACK    Asthma     Atrial fibrillation ([address]     Hypertension     Migraine        Past Surgical History: All past surgical history was reviewed today. Past Surgical History:   Procedure Laterality Date    ECTOPIC PREGNANCY SURGERY      FOOT SURGERY Right 4/9/13    HAMMERTOE CORRECTION 2ND TOE RIGHT FOOT    KIDNEY STONE SURGERY      TUMOR REMOVAL      fatty  arm         Immunization History: All immunization history was reviewed by me today.     Immunization History Administered Date(s) Administered    Influenza Virus Vaccine 10/15/2014    Influenza Whole 10/01/2012    Pneumococcal Conjugate 13-valent ([id number]) 02/05/2015    Pneumococcal Polysaccharide ([id number]) 10/15/2012       Family History: All family history was reviewed today. Problem Relation Age of Onset    Heart Disease Mother     Cancer Father         kidney       Objective:       PHYSICAL EXAM:      Vitals:   Vitals:    09/11/19 1000 09/11/19 1100 09/11/19 1200 09/11/19 1315   BP: (!) 166/64 (!) 162/53 (!) 162/55    Pulse: 65 57 67 76   Resp: 16 15 17 19   Temp:   97.8 °F (36.6 °C)    TempSrc:   Core    SpO2: 100% 93% 93%    Weight:       Height:           Physical Exam       General: intubated and sedated, on ventilator  HEENT: normocephalic, atraumatic, sclera clear, pupils equal, light reflex preserved bilaterally, endotracheal tube in place  Cardiovascular: RRR, no murmurs/rubs/gallops detected  Pulmonary: CTABL, no rhonchi/rales   Abdomen/GI: Cholecystostomy drain site healthy  Neuro: sedated, PERRL, moves all extremities when off sedation per RN  Skin: no skin tears or ulcers, no rash   Musculoskeletal:  No joint swelling, redness. No limitation of range of passive motion  Genitourinary: Fuller's catheter in place  Psych: could not assess  Lymphatic/Immunologic: No obvious bruising, no cervical lymphadenopathy    Lines: All vascular access sites are healthy with no local erythema, discharge or tenderness    Intake and output:    I/O last 3 completed shifts: In: 1553 [I.V.:2368; NG/GT:150; IV Piggyback:300]  Out: 1525 [Urine:975; Emesis/NG output:400; Drains:150]    Lab Data:   All available labs and old records have been reviewed by me.     CBC:  Recent Labs     09/09/19  0623 09/10/19  0525 09/11/19  0530   WBC 8.3 7.5 5.7   RBC 3.26* 3.28* 2.77*   HGB 9.4* 9.4* 8.0*   HCT 28.8* 28.9* 24.5*    327 329   MCV 88.3 87.9 88.7   MCH 28.7 28.6 28.9   MCHC 32.5 32.6 32.6   RDW 15.4 14.9 XR CHEST PORTABLE   Final Result   Findings suggestive of worsening pleural-parenchymal disease when compared to   the study of 9/1/2019 as described above. XR CHEST 1 VW   Final Result   1. Tip of the endotracheal tube is above the julian. Additional support   hardware, as detailed above. 2. Enlarged cardiomediastinal silhouette with findings suggestive of   pulmonary interstitial edema. Correlation with volume status is recommended. CT ABDOMEN PELVIS WO CONTRAST Additional Contrast? None   Final Result   Expected appearance of the cholecystostomy drain, with no significant   hematoma or edema along the extrahepatic course of the drain. The coil of   the drain is located within the gallbladder lumen, and the gallbladder is now   decompressed. Mildly hyperdense gallstones are again detected. Evidence of increasing anasarca, with increasing bilateral pleural effusions   with adjacent collapsing consolidation, increasing mesenteric and   retroperitoneal fat stranding, and with increasing subcutaneous fat stranding. CT ABSCESS DRAINAGE W CATH PLACEMENT S&I   Final Result   Findings consistent with acute, calculous cholecystitis. Successful percutaneous cholecystostomy tube by transhepatic approach. XR CHEST PORTABLE   Final Result   Bilateral airspace disease, increased in the left retrocardiac region         XR CHEST PORTABLE   Final Result   Endotracheal tube is in the expected position. Nasogastric tube appears to be within the distal esophagus. That should be   advanced approximately 12 additional cm. Location of this was discussed with   Dr. Tito Humphreys at 8:20 p.m. on 08/30/2019. Pulmonary vascular congestion with patchy bilateral airspace disease,   concerning for pulmonary edema. XR CHEST PORTABLE   Final Result   Mild cardiomegaly. Patchy airspace opacities bilaterally, may be related to pulmonary edema   versus pneumonia.       Mild me through [facility] or at the phone number provided below with my electronic signature. Any pictures or media included in this note were obtained after taking informed verbal consent from the patient and with their approval to include those in the patient's medical record.     Jaime Wilkes MD, MPH  9/3/19, 4:05 PM   [facility]   Office: [phone]  Fax: [phone]  Tuesday AM clinic:   [address] [place] [address] 120  Thursday AM [id number] Jem, [facility]

## 2019-09-11 NOTE — PLAN OF CARE
Problem: Falls - Risk of:  Goal: Will remain free from falls  Description  Will remain free from falls  Outcome: Ongoing   Patient placed on fall Precautions per [facility] Fall Risk Assessment Scale (Score> 45). Fall risk armband on, yellow blanket placed on foot of bed, S.A.F.E. sign or orange light displayed at door. Bed in locked and low position, bed alarm armed and audible, call light and bedside table in reach. Patient acknowledges the need to call before getting out of bed. Q2 monitoring, and toileting performed. Problem: Risk for Impaired Skin Integrity  Goal: Tissue integrity - skin and mucous membranes  Description  Structural intactness and normal physiological function of skin and  mucous membranes. Outcome: Ongoing   At risk for skin breakdown. See Francisco J scale. Remains on bedrest.  Unable to position self in bed. Turn and reposition every two hours and as needed. Heels elevated off bed. Protective barrier placed as needed. Patient kept clean and dry. Pillows used for positioning. Will continue to monitor for skin breakdown. Problem: Venous Thromboembolism:  Goal: Will show no signs or symptoms of venous thromboembolism  Description  Will show no signs or symptoms of venous thromboembolism  Outcome: Ongoing   SCD's remain  in use. No s/s of DVT at this time. Problem: MECHANICAL VENTILATION  Goal: Patient will maintain patent airway  Outcome: Ongoing   Patient tolerating vent on CPAP mode.

## 2019-09-12 NOTE — PROGRESS NOTES
Infectious Diseases   Progress Note      Admission Date: 8/30/2019  Hospital Day: Hospital Day: 15 .cur  Attending: Patrica Caballero MD  Date of service: 9/3/19     Chief complaint/ Reason for consult: The patient was seen today for the following:    · Septic shock on admission  · E. coli bacteremia  · Acute cholecystitis with cholelithiasis with common bile duct dilatation status post cholecystostomy by IR  · Transaminitis    Microbiology:        I have reviewed all available micro lab data and cultures    · Blood culture (2/2) - collected on 8/30/2019: E. coli    Escherichia coli (2)     Antibiotic Interpretation RICHAR Status    ampicillin Sensitive 8 mcg/mL     ceFAZolin Sensitive <=4 mcg/mL     cefepime Sensitive <=0.12 mcg/mL     cefTRIAXone Sensitive <=0.25 mcg/mL     ciprofloxacin Sensitive <=0.25 mcg/mL     gentamicin Sensitive <=1 mcg/mL     levofloxacin Sensitive <=0.12 mcg/mL     piperacillin-tazobactam Sensitive <=4 mcg/mL     trimethoprim-sulfamethoxazole Sensitive <=20 mcg/mL           Antibiotics and immunizations:       Current antibiotics: All antibiotics and their doses were reviewed by me    Recent Abx Admin                   metronidazole (FLAGYL) 500 mg in NaCl 100 mL IVPB premix (mg) 500 mg New Bag 09/12/19 0910     500 mg New Bag  0157     500 mg New Bag 09/11/19 1811    ciprofloxacin (CIPRO) IVPB 400 mg (mg) 400 mg New Bag 09/12/19 0500     400 mg New Bag 09/11/19 1745                  Immunization History: All immunization history was reviewed by me today. Immunization History   Administered Date(s) Administered    Influenza Virus Vaccine 10/15/2014    Influenza Whole 10/01/2012    Pneumococcal Conjugate 13-valent ([id number]) 02/05/2015    Pneumococcal Polysaccharide ([id number]) 10/15/2012       Known drug allergies:      All allergies were reviewed and updated    Allergies   Allergen Reactions    Alendronate Sodium      Other reaction(s): jaw pain---concern 4 thad necro, jaw without obstruction K80.00    Acute abdomen R10.0    Agitation R45.1    Common bile duct dilatation K83.8    Septic shock due to Escherichia coli (HCC) A41.51, R65.21    Septic shock (HCC) A41.9, R65.21    Alveolar pneumonopathy (HCC) J84.09    E coli bacteremia R78.81    Cholecystostomy care ([facility] [address] [facility] Z43.4    Endotracheally intubated Z97.8    On mechanically assisted ventilation (HCC) Z99.11    Overweight E66.3    Chronic atrial fibrillation (HCC) I48.2    Permanent atrial fibrillation (HCC) I48.2    High fever R50.9    Hypomagnesemia E83.42    Fever R50.9    Atelectasis J98.11    Acute encephalopathy G93.40       Please note that this chart was generated using Dragon dictation software. Although every effort was made to ensure the accuracy of this automated transcription, some errors in transcription may have occurred inadvertently. If you may need any clarification, please do not hesitate to contact me through [facility] or at the phone number provided below with my electronic signature. Any pictures or media included in this note were obtained after taking informed verbal consent from the patient and with their approval to include those in the patient's medical record.     Zackary Baird MD, MPH  9/3/19, 4:05 PM   [facility] Infectious Disease   Office: [phone]  Fax: [phone]  Tuesday AM clinic:   [address] [place] [address] 120  Thursday [id number] [facility]

## 2019-09-12 NOTE — ANESTHESIA PRE PROCEDURE
Department of Anesthesiology  Preprocedure Note       Name:  Joan Edmondson   Age:  67 y.o.  :  1940                                          MRN:  [id number]         Date:  2019      Surgeon: Paris Litten):  Moose Unger MD    Procedure: TRACHEOSTOMY (N/A )    Medications prior to admission:   Prior to Admission medications    Medication Sig Start Date End Date Taking? Authorizing Provider   amitriptyline (ELAVIL) 10 MG tablet Take 10 mg by mouth nightly   Yes Historical Provider, MD   warfarin (COUMADIN) 4 MG tablet TAKE 1 TABLET BY MOUTH EVERY DAY 18  Yes Fabricio Boyle APRN - CNP   propranolol (INDERAL LA) 60 MG extended release capsule Take 60 mg by mouth every morning   Yes Historical Provider, MD   oxyCODONE (OXY-IR) 15 MG immediate release tablet Take 15 mg by mouth 3 times daily. .   Yes Historical Provider, MD   propranolol (INDERAL LA) 120 MG extended release capsule  17  Yes Historical Provider, MD   vitamin D (ERGOCALCIFEROL) 37920 UNITS CAPS capsule 50,000 Units once a week  16  Yes Historical Provider, MD   Tiotropium Bromide Monohydrate (SPIRIVA RESPIMAT) 2.5 MCG/ACT AERS Inhale 2 puffs into the lungs daily 9/8/15  Yes Laureano Lewis MD   venlafaxine (EFFEXOR-XR) 150 MG XR capsule Take 150 mg by mouth daily. XR   Yes Historical Provider, MD   albuterol (PROVENTIL HFA;VENTOLIN HFA) 108 (90 BASE) MCG/ACT inhaler Inhale 2 puffs into the lungs every 6 hours as needed. Yes Historical Provider, MD   topiramate (TOPAMAX) 50 MG tablet Take 50 mg by mouth 2 times daily.    Yes Historical Provider, MD       Current medications:    Current Facility-Administered Medications   Medication Dose Route Frequency Provider Last Rate Last Dose    PN-Adult Premix  - Standard Electrolytes - Central Line   Intravenous Continuous TPN Moose Unger MD        scopolamine (TRANSDERM-SCOP) transdermal patch 1 patch  1 patch Transdermal Q72H Brian Peguero MD   1 patch at Cholecystostomy care ([facility] Z43.4    Endotracheally intubated Z97.8    On mechanically assisted ventilation (HCC) Z99.11    Overweight E66.3    Chronic atrial fibrillation (HCC) I48.2    Permanent atrial fibrillation (HCC) I48.2    High fever R50.9    Hypomagnesemia E83.42    Fever R50.9    Atelectasis J98.11    Acute encephalopathy G93.40       Past Medical History:        Diagnosis Date    Arthritis     BACK    Asthma     Atrial fibrillation (HCC)     Hypertension     Migraine        Past Surgical History:        Procedure Laterality Date    ECTOPIC PREGNANCY SURGERY      FOOT SURGERY Right 4/9/13    HAMMERTOE CORRECTION 2ND TOE RIGHT FOOT    KIDNEY STONE SURGERY      TUMOR REMOVAL      fatty  arm       Social History:    Social History     Tobacco Use    Smoking status: Former Smoker    Smokeless tobacco: Never Used    Tobacco comment: quitx26 years   Substance Use Topics    Alcohol use: No                                Counseling given: Not Answered  Comment: quitx26 years      Vital Signs (Current):   Vitals:    09/12/19 0900 09/12/19 1100 09/12/19 1200 09/12/19 1217   BP: (!) 176/73 (!) 148/105 136/75    Pulse: 112 101 97 97   Resp: 21 21 24 17   Temp: 37.4 °C (99.3 °F)  37.9 °C (100.3 °F)    TempSrc: Core  Core    SpO2: 99%      Weight:       Height:                                                  BP Readings from Last 3 Encounters:   09/12/19 136/75   06/09/19 137/74   04/30/19 126/80       NPO Status:                                                                                 BMI:   Wt Readings from Last 3 Encounters:   09/12/19 143 lb 11.8 oz (65.2 kg)   06/08/19 135 lb (61.2 kg)   04/30/19 143 lb (64.9 kg)     Body mass index is 26.29 kg/m².     CBC:   Lab Results   Component Value Date    WBC 6.5 09/12/2019    RBC 3.41 09/12/2019    HGB 9.9 09/12/2019    HCT 30.5 09/12/2019    MCV 89.2 09/12/2019    RDW 15.3 09/12/2019     09/12/2019       CMP:   Lab Results   Component

## 2019-09-12 NOTE — PROGRESS NOTES
pt extubate and put on NC. Immediately began having audible stridor with O2 sat in the 70s. Dr. Carlota Meadows brought to bedside for reintubation. Intubated with etomidate and succs. 7.5 @ 22. Restarted propfol gtt. Call made to pt daughter Mariola Oswald to update of event. Spoke with Su's  Nargis Kang. He states that she will want her to be trached asked that Mariola Oswald call when she wakes from her nap to give consent.  Puneet Dixon RN, BSN

## 2019-09-12 NOTE — PROGRESS NOTES
--   09/12/19 1100 (!) 148/105 -- -- 101 21 --   09/12/19 0900 (!) 176/73 99.3 °F (37.4 °C) CORE 112 21 99 %   09/12/19 0843 -- -- -- 102 26 100 %     I/O last 3 completed shifts: In: 3121 [I.V.:2978; NG/GT:60; IV Piggyback:680]  Out: 2454 [Urine:3950; Emesis/NG output:300; Drains:230]  No intake/output data recorded. General Appearance: Intubated but alert, in no acute distress  Skin: warm and dry, no rash or erythema  Head: normocephalic and atraumatic  Eyes: pupils equal, round, and reactive to light, extraocular eye movements intact, conjunctivae normal  ENT: external ear and ear canal normal bilaterally, nose without deformity, nasal mucosa and turbinates normal  Neck: supple and non-tender without mass, no cervical lymphadenopathy  Pulmonary/Chest: clear to auscultation bilaterally- no wheezes, rales or rhonchi, normal air movement, no respiratory distress  Cardiovascular: normal rate, regular rhythm,  no murmurs, rubs, distal pulses intact, no carotid bruits  Abdomen: soft, non-tender, non-distended, normal bowel sounds, no masses or organomegaly  Lymph Nodes: Cervical, supraclavicular normal  Extremities: no cyanosis, clubbing or edema  Musculoskeletal: normal range of motion, no joint swelling, deformity or tenderness  Neurologic: Alert but not following commands, no focal neurologic deficits    Data Review:  CBC:   Lab Results   Component Value Date    WBC 6.5 09/12/2019    RBC 3.41 09/12/2019     BMP:   Lab Results   Component Value Date    GLUCOSE 142 09/12/2019    CO2 22 09/12/2019    BUN 20 09/12/2019    CREATININE 0.6 09/12/2019    CALCIUM 8.8 09/12/2019     ABG:   Lab Results   Component Value Date    RUV4XKW 24.2 09/10/2019    BEART 0.7 09/10/2019    [id number] 99.7 09/10/2019    PHART 7.464 09/10/2019    ARX5FBS 33.8 09/10/2019    PO2ART 136.0 09/10/2019    THX9JJA 56.6 09/10/2019       Radiology: All pertinent images / reports were reviewed as a part of this visit.     Narrative   EXAMINATION: ONE XRAY VIEW OF THE CHEST       9/9/2019 7:11 am       COMPARISON:   CT chest, abdomen and pelvis 09/06/2019.  Chest x-ray 09/05/2019.       HISTORY:   ORDERING SYSTEM PROVIDED HISTORY: Acute Respiratory Failure   TECHNOLOGIST PROVIDED HISTORY:   Reason for exam:->Acute Respiratory Failure   Reason for Exam: Acute Respiratory Failure   Acuity: Unknown   Type of Exam: Unknown       FINDINGS:   Lines and tubes remain in place.       Improved aeration to the lung bases with still likely a small left pleural   effusion and some left basilar atelectasis or infiltrate.  Right lung appears   to be clear.  No new airspace disease is seen.  No pneumothoraces are seen. Cardiac and mediastinal silhouettes are stable.  Stable appearance to bony   structures.           Impression   Improved aeration to the lung bases with still likely a small left pleural   effusion and some left basilar atelectasis or infiltrate. Problem List:     Acute hypoxic respiratory failure  Severe sepsis from acute cholecystitis/pancreatitis  E. coli bacteremia  Altered mental status  Atrial fibrillation  Persistent fever  Assessment/Plan:     Acute hypoxic respiratory failure, requiring intubation since admission. No significant O2 needs noted on the ventilator. Chest x-ray is clear. No significant oxygenation or gas exchange issues noted. Poor air leak treated with Solu-Medrol, air leak has somewhat improved today. Need to wean down propofol to see if patient would be appropriate for extubation. Altered mental status possibly related to severe sepsis from acute cholecystitis and pancreatitis. Still not following commands. Currently on IV ciprofloxacin and Flagyl as per ID. Still has persistent low-grade fevers. Persistent atrial fibrillation-on subcutaneous Lovenox. Switch to oral Coumadin tomorrow if extubation is successful today. On Pepcid and Lovenox. On TPN as per surgery.     Critical care team will

## 2019-09-12 NOTE — PROGRESS NOTES
ABDOMEN AND PELVIS WITHOUT CONTRAST 8/31/2019 2:09 pm TECHNIQUE: CT of the abdomen and pelvis was performed without the administration of intravenous contrast. Multiplanar reformatted images are provided for review. Dose modulation, iterative reconstruction, and/or weight based adjustment of the mA/kV was utilized to reduce the radiation dose to as low as reasonably achievable. COMPARISON: 08/30/2019 HISTORY: ORDERING SYSTEM PROVIDED HISTORY: suspected bleeding in drain site area TECHNOLOGIST PROVIDED HISTORY: Additional Contrast?->None Reason for Exam: suspected bleeding in drain site area Acuity: Unknown Type of Exam: Ongoing FINDINGS: Lower Chest: There are small bilateral pleural effusions with adjacent collapsing consolidation within the lower lungs, increased when compared to the previous exam.  Emphysema within the lower lungs again noncontrast imaging of the base of the heart is unremarkable. Organs: Patient has undergone interval transhepatic cholecystostomy, with a drain remaining in place. The gallbladder is now decompressed. There continues to be foci of hyperdensity within the gallbladder, compatible with the gallstones seen previously. No hematoma is detected. No abnormal fluid is identified along the lateral extrahepatic course of the drain. No significant edema is identified within the fat adjacent to the drain. Noncontrast imaging the remainder of the liver is otherwise unremarkable. Noncontrast imaging of the spleen and pancreas unremarkable. Bilateral chronic adrenal calcifications again noted. Bilateral perinephric fat stranding is seen. Contrast is being excreted within the renal collecting system from the contrast enhanced scan from yesterday. GI/Bowel: Large bowel is unremarkable. Appendix is mildly dilated, but without periappendiceal fat stranding to suggest acute appendicitis. Nasogastric tube is present within the stomach.   The stomach, duodenal sweep, and the remainder of the silhouettes are unchanged. Calcification of aorta. Right upper quadrant catheter is again demonstrated. Left upper extremity PICC line extends to the expected location of superior vena cava. Bilateral edema or pneumonia, similar to prior, with small bilateral pleural effusions. Support apparatus as above. Xr Chest Portable    Result Date: 9/2/2019  EXAMINATION: ONE XRAY VIEW OF THE CHEST 9/2/2019 5:27 am COMPARISON: Prior studies most recent 9/1/2019 HISTORY: ORDERING SYSTEM PROVIDED HISTORY: intubated TECHNOLOGIST PROVIDED HISTORY: Reason for exam:->intubated FINDINGS: Expiratory AP portable view of the chest has been obtained. Endotracheal tube remains in place with distal tip located approximately 3 cm above the julian. Nasogastric tube remains in place with distal tip located within the distal aspect of the stomach. Percutaneous cholecystostomy tube again identified in the right upper quadrant. Cardiopericardial silhouette is enlarged but unchanged. There is fullness/ill definition of pulmonary vascularity. There is increasing parenchymal disease at both lung bases. Density in the region of the cardiophrenic angles raises possibility of underlying pleural effusions. Findings suggestive of worsening pleural-parenchymal disease when compared to the study of 9/1/2019 as described above. Xr Chest Portable    Result Date: 8/31/2019  EXAMINATION: ONE XRAY VIEW OF THE CHEST 8/31/2019 5:17 am COMPARISON: 08/30/2019 HISTORY: ORDERING SYSTEM PROVIDED HISTORY: Vent TECHNOLOGIST PROVIDED HISTORY: Reason for exam:->Vent FINDINGS: Lung volumes are low accentuating heart size and bronchovascular markings at the lung bases. Tip of ET tube is unchanged. NG tube has been advanced. Tip is now seen in the stomach Scattered opacities are seen throughout the lungs bilaterally, right greater than left, slightly increased in the left lower lobe.      Bilateral airspace disease, increased in the left retrocardiac region     Xr Chest Portable    Result Date: 8/30/2019  EXAMINATION: ONE XRAY VIEW OF THE CHEST 8/30/2019 5:10 pm COMPARISON: None. HISTORY: ORDERING SYSTEM PROVIDED HISTORY: et placement TECHNOLOGIST PROVIDED HISTORY: Reason for exam:->et placement Reason for Exam: et placement and ng placement Acuity: Acute Type of Exam: Initial FINDINGS: The tip of the endotracheal tube is in the expected position, superimposed approximately 3 cm above the julian. The nasogastric tube tip and side port are superimposed over the distal esophagus. That tube should be advanced approximately 12 additional cm prior to use. The pulmonary vasculature is congested, with patchy airspace disease bilaterally. No pneumothorax is found. No free air is seen. No acute bony abnormality. Endotracheal tube is in the expected position. Nasogastric tube appears to be within the distal esophagus. That should be advanced approximately 12 additional cm. Location of this was discussed with Dr. Ling Reyes at 8:20 p.m. on 08/30/2019. Pulmonary vascular congestion with patchy bilateral airspace disease, concerning for pulmonary edema. Xr Chest Portable    Result Date: 8/30/2019  EXAMINATION: ONE XRAY VIEW OF THE CHEST 8/30/2019 6:30 pm COMPARISON: August 26, 2015 HISTORY: ORDERING SYSTEM PROVIDED HISTORY: pain or SOB TECHNOLOGIST PROVIDED HISTORY: Reason for exam:->pain or SOB Reason for Exam: sob Acuity: Unknown Type of Exam: Unknown FINDINGS: The cardiomediastinal silhouette is mildly enlarged. There are patchy airspace opacities bilaterally, may be related to pulmonary edema versus pneumonia. There is mild left pleural effusion. There is no pneumothorax. There is no acute osseous abnormality. Mild cardiomegaly. Patchy airspace opacities bilaterally, may be related to pulmonary edema versus pneumonia. Mild left pleural effusion.      Xr Chest 1 Vw    Result Date: 9/2/2019  EXAMINATION: ONE XRAY VIEW OF THE CHEST 9/1/2019

## 2019-09-12 NOTE — FLOWSHEET NOTE
Paged per nurse to talk with family re: Advance Directives. Daughter, Nikita Peter, says she has received conflicting information about [facility] and LW and thought she should get them filled out. Explained that only the patient can sign those forms and she must be able to show or indicated in some way that she understands what she is signing. Daughter is understanding of this. Currently patient is on ventilator and does not seem oriented or able to follow commands. The daughter is patient's next of kin and patient has lived with r and s-I-l for past 12 years. Explained to daughter that as next of kin, medical staff would approach her with questions and to make necessary decisions. Spiritual support provided through conversation, encouragement, allowing daughter to vent emotion around current situation, prayer and pastoral presence.

## 2019-09-12 NOTE — PROGRESS NOTES
Pt extubated and placed on 4lpm nasal cannula. Pt immediately began having upper airway stridor and SPO2 was in the 70's. Pt placed on non-rebreather mask. Pt reintubated by Dr Anali Esteban.   Size 7.5 ETT secured at 22cm@ lip

## 2019-09-12 NOTE — OP NOTE
Endotracheal Intubation Procedure Note    Indication for endotracheal intubation: Stridor  Mallampati score: 1  ASA: 5  Sedation: etomidate 20MG  Paralytic: succinylcholine 100MG  Lidocaine: no  Atropine: no  Equipment: Kwame 3 laryngoscope blade. and 8.0mm cuffed endotracheal tube. Cricoid Pressure: no  Number of attempts: 1, vocal cord edema noted  ETT location confirmed by by auscultation, by CXR and ETCO2 monitor.

## 2019-09-12 NOTE — PROGRESS NOTES
([facility]     ARDS (adult respiratory distress syndrome) ([facility] [facility]     Calculus of gallbladder with acute cholecystitis without obstruction     Sepsis ([facility] [address] 08/30/2019    Essential hypertension 10/07/2018    Atrial fibrillation with rapid ventricular response ([address] 09/06/2018    Otalgia of both ears 11/13/2017    Mass of right forearm 07/12/2017    Hiatal hernia 02/05/2015    Emphysema lung ([facility] [address] 02/05/2015    COPD (chronic obstructive pulmonary disease) ([facility] [address] 02/05/2015    Respiratory failure with hypercapnia ([facility] [address] 01/20/2015    Hammertoe 04/09/2013    Migraine 04/09/2013    Hypertriglyceridemia 04/09/2013    Asthma 04/09/2013    Chronic back pain 04/09/2013      Assessment and Plan:     1. Persistent Atrial Fibrillation  - S/p failed DCCV on 9/2018  - Currently in afib, rate 90s. Rate faster today likely due to d/c of precedex gtt, but plans to use precedex again which will lower her rate (60s last several days while on it)  - Continue metoprolol 50 mg BID   ~ Modest rate control given acute illness is acceptable given acute illness and sepsis. HR <115 is reasonable    - [id number] score: 4 (Age, Gender, HTN, CVA); [id number] Vasc score and anticoagulation discussed. High risk for stroke and thromboembolism. Anticoagulation is recommended.   ~ On weight based lovenox; plan to resume coumadin after trach ?    - Afib risk factors including age, HTN, obesity, inactivity and SHA were discussed with patient. Risk factor modification recommended   ~ TSH 2.43 (9/3/19)    2. Acute cholecystitis   - S/p percutaneous drain on 8/31/19   - On IV ABX   - Surgery following    3. Sepsis, fevers   - + blood cx for E coli   - No fevers overnight   - On IV ABX   - Remains off pressors, BP controlled   - ID following    4. Acute hypoxic respiratory failure   - Remains intubated, sedated on precedex   - Difficulty with attempts to wean to extubate due to agitation and inability to follow commands.  May need trach if no

## 2019-09-12 NOTE — PLAN OF CARE
Problem: Falls - Risk of:  Goal: Absence of physical injury  Description  Absence of physical injury  Outcome: Ongoing   Patient placed on fall Precautions per [facility] Fall Risk Assessment Scale (Score> 45). Fall risk armband on, yellow blanket placed on foot of bed, S.A.F.E. sign or orange light displayed at door. Bed in locked and low position, bed alarm armed and audible, call light and bedside table in reach. Patient acknowledges the need to call before getting out of bed. Q2 monitoring, and toileting performed. Problem: Risk for Impaired Skin Integrity  Goal: Tissue integrity - skin and mucous membranes  Description  Structural intactness and normal physiological function of skin and  mucous membranes. Outcome: Ongoing   At risk for skin breakdown. See Francisco J scale. Remains on bedrest.  Unable to position self in bed. Turn and reposition every two hours and as needed. Heels elevated off bed. Protective barrier placed as needed. Patient kept clean and dry. Pillows used for positioning. Will continue to monitor for skin breakdown. Problem: Infection, Septic Shock:  Goal: Will show no infection signs and symptoms  Description  Will show no infection signs and symptoms  Outcome: Ongoing   Surgical site with no s/s of infection. Problem: Serum Glucose Level - Abnormal:  Goal: Ability to maintain appropriate glucose levels will improve  Description  Ability to maintain appropriate glucose levels will improve  Outcome: Ongoing   - no coverage needed at this time. Will continue to monitor q4 and provide coverage when needed.

## 2019-09-13 PROBLEM — E44.0 MODERATE MALNUTRITION (HCC): Chronic | Status: ACTIVE | Noted: 2019-01-01

## 2019-09-13 NOTE — BRIEF OP NOTE
Tube Feeding Status Continuous 9/8/2019  6:00 AM   Rate/Schedule 20 mL/hr 9/8/2019  6:00 AM   Tube Feeding Supplement (Product) Protein Modular 9/5/2019  2:00 PM   Tube Feeding Supplement Amount (mL) 30 9/5/2019  2:00 PM   Tube Feeding Intake (mL) 469 ml 9/7/2019  3:28 PM   Free Water Flush (mL) 40 mL 9/12/2019  3:40 PM   Free Water Rate 30 Q 6hr 9/7/2019  4:45 AM   Residual Volume (ml) 0 ml 9/10/2019 10:00 PM   Output (mL) 100 ml 9/12/2019  6:00 AM       [REMOVED] Urethral Catheter Temperature probe 18 fr (Removed)   Catheter Indications Need for fluid management in critically ill patients in a critical care setting not able to be managed by other means such as BSC with hat, bedpan, urinal, condom catheter, or short term intermittent urethral catherization 9/5/2019  8:00 AM   Site Assessment No urethral drainage 9/5/2019  8:00 AM   Urine Color Yellow 9/5/2019  8:00 AM   Urine Appearance Hazy 9/5/2019  8:00 AM   Urine Odor Other (Comment) 9/5/2019  8:00 AM   Output (mL) 1600 mL 9/5/2019 11:00 AM       Findings: 8 Fr cuffed tube placed    Tatiana Cleaning MD  Date: 9/13/2019  Time: 3:57 PM

## 2019-09-13 NOTE — PROGRESS NOTES
Workup:    · Continue to follow  fever curve, WBC count and blood cultures  · Follow up on liver and renal function    Antimicrobials:    · The patient has been on gram-negative anaerobic coverage since 8/30/2019 and has a completed a 2-week course of antibiotic for E. coli bacteremia as well. All follow-up cultures have been negative. · Will stop ciprofloxacin and Flagyl today and see how she does  · Will continue empiric IV anidulafungin given TPN, over the weekend, while following fungal cultures from the blood sent yesterday. Fever seems to responded to addition of Anoro function  · Endotracheal tube care and pulmonary toilet  · Pressure ulcer prevention precaution  · Continue ventilator support  · Will likely need tracheostomy. Follow-up on pulmonary plans  · DVT prophylaxis  · Discussed all above with patient's sister at bedside  · Discussed with ICU team    Drug Monitoring:    · Continue monitoring for antibiotic toxicity as follows: CMP  · Continue to watch for following: new or worsening fever, new hypotension, hives, lip swelling and redness or purulence at vascular access sites. I/v access Management:    · Continue to monitor i.v access sites for erythema, induration, discharge or tenderness. · As always, continue efforts to minimize tubes/lines/drains as clinically appropriate to reduce chances of line associated infections. Risk of Complications/Morbidity: High     TIME SPENT TODAY:     - Spent over  36 minutes on visit (including interval history, physical exam, review of data including labs, cultures, imaging, development and implementation of treatment plan and coordination of complex care). - Over 50% of time spent with patient face to face on counseling and education. Thank you for involving me in the care of your patient. I will continue to follow. If you have anyadditional questions, please do not hesitate to contact me. Subjective:      Interval history: Patient was seen side  Neuro: sedated, PERRL, moves all extremities when off sedation per RN  Skin: no skin tears or ulcers, no rash   Musculoskeletal:  No joint swelling, redness. No limitation of range of passive motion  Genitourinary: Fuller's catheter in place  Psych: could not assess  Lymphatic/Immunologic: No obvious bruising, no cervical lymphadenopathy    Lines: All vascular access sites are healthy with no local erythema, discharge or tenderness  Intake and output:    I/O last 3 completed shifts: In: 6773 [I.V.:2972; NG/GT:40]  Out: 3220 [Urine:2925; Drains:295]    Lab Data:   All available labs and old records have been reviewed by me. CBC:  Recent Labs     09/11/19  0530 09/11/19  1825 09/12/19  0520 09/13/19  0430   WBC 5.7  --  6.5 10.0   RBC 2.77*  --  3.41* 3.26*   HGB 8.0* 9.3* 9.9* 9.3*   HCT 24.5* 28.2* 30.5* 28.7*     --  529* 616*   MCV 88.7  --  89.2 87.9   MCH 28.9  --  28.9 28.6   MCHC 32.6  --  32.4 32.5   RDW 14.9  --  15.3 15.3        BMP:  Recent Labs     09/11/19  0530  09/12/19  0520 09/13/19  0429     --  145 147*   K 3.2*   < > 4.4 4.1  4.1   *  --  115* 115*   CO2 24  --  22 23   BUN 23*  --  20 22*   CREATININE 0.6  --  0.6 0.6   CALCIUM 8.3  --  8.8 8.9   GLUCOSE 128*  --  142* 150*    < > = values in this interval not displayed. Hepatic Function Panel:   Lab Results   Component Value Date    ALKPHOS 229 09/13/2019    ALT 53 09/13/2019    AST 70 09/13/2019    PROT 6.6 09/13/2019    BILITOT 0.6 09/13/2019    BILIDIR 2.7 08/31/2019    IBILI -0.2 08/31/2019    LABALBU 3.2 09/13/2019       CPK: No results found for: CKTOTAL  ESR: No results found for: SEDRATE  CRP: No results found for: CRP        Imaging: All pertinent images and reports for the current visit were reviewed by me during this visit. XR CHEST PORTABLE   Final Result   Mild cardiomegaly and chronic pulmonary change with a mild degree of   congestion.          XR CHEST PORTABLE   Final Result   No acute focal infiltrate is identified. CT HEAD WO CONTRAST   Final Result   Motion limited examination, demonstrating atrophy and small vessel ischemic   disease. Persistent left mastoid effusion. Redemonstration of mass involving the foramen ovale extending to the left   parapharyngeal space. Some considerations include schwannoma or neurofibroma. XR CHEST PORTABLE   Final Result   Improved aeration to the lung bases with still likely a small left pleural   effusion and some left basilar atelectasis or infiltrate. CT CHEST ABDOMEN PELVIS W CONTRAST   Final Result   Small to moderate left and small right pleural effusions with partial   consolidation in the lower lobes. Findings may be related to infection or   atelectasis. Mild soft tissue edema in the abdomen and pelvis as well as small volume   ascites in the pelvis. External drain terminating in the gallbladder. No biliary dilatation. XR CHEST PORTABLE   Final Result   Increased pulmonary edema. No significant change of bilateral pleural effusions and bibasilar airspace   disease         XR CHEST PORTABLE   Final Result   Bilateral edema or pneumonia, similar to prior, with small bilateral pleural   effusions. Support apparatus as above. XR CHEST PORTABLE   Final Result   Findings suggestive of worsening pleural-parenchymal disease when compared to   the study of 9/1/2019 as described above. XR CHEST 1 VW   Final Result   1. Tip of the endotracheal tube is above the julian. Additional support   hardware, as detailed above. 2. Enlarged cardiomediastinal silhouette with findings suggestive of   pulmonary interstitial edema. Correlation with volume status is recommended. CT ABDOMEN PELVIS WO CONTRAST Additional Contrast? None   Final Result   Expected appearance of the cholecystostomy drain, with no significant   hematoma or edema along the extrahepatic course of the drain.   The coil of   the drain is located within the gallbladder lumen, and the gallbladder is now   decompressed. Mildly hyperdense gallstones are again detected. Evidence of increasing anasarca, with increasing bilateral pleural effusions   with adjacent collapsing consolidation, increasing mesenteric and   retroperitoneal fat stranding, and with increasing subcutaneous fat stranding. CT ABSCESS DRAINAGE W CATH PLACEMENT S&I   Final Result   Findings consistent with acute, calculous cholecystitis. Successful percutaneous cholecystostomy tube by transhepatic approach. XR CHEST PORTABLE   Final Result   Bilateral airspace disease, increased in the left retrocardiac region         XR CHEST PORTABLE   Final Result   Endotracheal tube is in the expected position. Nasogastric tube appears to be within the distal esophagus. That should be   advanced approximately 12 additional cm. Location of this was discussed with   Dr. Robin Zuluaga at 8:20 p.m. on 08/30/2019. Pulmonary vascular congestion with patchy bilateral airspace disease,   concerning for pulmonary edema. XR CHEST PORTABLE   Final Result   Mild cardiomegaly. Patchy airspace opacities bilaterally, may be related to pulmonary edema   versus pneumonia. Mild left pleural effusion. CT ABDOMEN PELVIS W IV CONTRAST Additional Contrast? None   Final Result   Moderate motion degradation. The gallbladder is dilated, with cholelithiasis noted. Common duct also is   dilated, which is new when compared to the previous exam.  There is suspected   mild pericholecystic edema, but motion is present. All-in-all, cholecystitis   is a concern. Very small bilateral pleural effusions. Patulous fluid-filled distal esophagus. Correlate with clinical evidence of   reflux and possible esophagitis. Nonobstructing right-sided nephrolithiasis.       Small inguinal hernia contains several short knuckles of small bowel, but   without evidence obstruction. Medications: All current and past medications were reviewed.      anidulafungin  100 mg Intravenous Q24H    scopolamine  1 patch Transdermal Q72H    oxyCODONE  5 mg Oral TID    methylPREDNISolone  40 mg Intravenous Q6H    [START ON 9/19/2019] fat emulsion  250 mL Intravenous Once per day on Mon Thu    insulin lispro  0-6 Units Subcutaneous Q4H    ciprofloxacin  400 mg Intravenous Q12H    metroNIDAZOLE  500 mg Intravenous Q8H    lactobacillus  1 capsule Oral BID WC    enoxaparin  70 mg Subcutaneous BID    metoprolol tartrate  50 mg Oral BID    warfarin (COUMADIN) daily dosing (placeholder)   Other RX Placeholder    ciprofloxacin  1 drop Right Eye 4 times per day    famotidine (PEPCID) injection  20 mg Intravenous BID    sodium chloride flush  10 mL Intravenous 2 times per day    topiramate  50 mg Oral BID    sodium chloride flush  10 mL Intravenous 2 times per day        PN-Adult Premix 5/20 - Standard Electrolytes - Central Line      PN-Adult Premix 5/20 - Standard Electrolytes - Central Line 40 mL/hr at 09/12/19 1830    propofol 45 mcg/kg/min (09/13/19 0619)    sodium chloride 70 mL/hr at 09/12/19 1840    dextrose      dexmedetomidine (PRECEDEX) IV infusion Stopped (09/12/19 0920)    fentaNYL (SUBLIMAZE) infusion Stopped (09/09/19 0800)       metoprolol, glucose, dextrose, glucagon (rDNA), dextrose, albuterol sulfate HFA, ipratropium, sodium chloride flush, haloperidol lactate, potassium chloride, magnesium sulfate, morphine, magnesium hydroxide, ondansetron, acetaminophen      Problem list:       Patient Active Problem List   Diagnosis Code    Rena M20.40    Migraine G43.909    Hypertriglyceridemia E78.1    Asthma J45.909    Chronic back pain M54.9, G89.29    Respiratory failure with hypercapnia ([address] J96.92    Hiatal hernia K44.9    Emphysema lung ([address] J43.9    COPD (chronic obstructive pulmonary disease) ([address] J44.9    Mass of

## 2019-09-13 NOTE — PROGRESS NOTES
No acute changes noted to assessment. See flowsheets for complete details. VSS. Morning labs collected via PICC line. Oral and blaire care provided, bed pad changed, and patient repositioned in bed. No needs at this time. Will continue to monitor.

## 2019-09-13 NOTE — PROGRESS NOTES
Nutrition Assessment (Parenteral Nutrition)    Type and Reason for Visit: Reassess    Nutrition Recommendations:   1. Advance Clinimix 5/20 to goal rate 75 mL per hour  2. Physician/LIP to monitor closely and correct lytes (Phos,Mg,K+) d/t risk of refeeding syndrome  3. Recommend FSBS, monitor glucose, need for insulin  4. Pharmacy to adjust MVI and Trace Elements as needed     Nutrition Assessment: Pt's nutrition status is improving. Pt tolerating Clinimix 5/20 at 40 mL per hour. K+, Mg+ and Phos WNL. Na+ 147 being treated by MD. Pt remains intubated and sedated. Propofol infusing at 19.4 mL per hour to provide 512 calories from fat daily. Planning for tracheostomy today. Re-evaluated TPN goal rate as propofol has resumed; d/w Pharmacy and plan to increase to reduced goal rate 75 mL per hour this evening. Pt will be at risk for further nutrition compromise until demonstrating tolerance to TPN at goal.     Malnutrition Assessment:  · Malnutrition Status: Meets the criteria for moderate malnutrition  · Context: Acute illness or injury  · Findings of the 6 clinical characteristics of malnutrition (Minimum of 2 out of 6 clinical characteristics is required to make the diagnosis of moderate or severe Protein Calorie Malnutrition based on AND/ASPEN Guidelines):  1. Energy Intake-Less than or equal to 50% of estimated energy requirement, Greater than or equal to 5 days    2. Weight Loss-2% loss or greater(3.5% loss), (in 14 days )  3. Fat Loss-No significant subcutaneous fat loss,    4. Muscle Loss-No significant muscle mass loss,    5. Fluid Accumulation-Moderate to severe fluid accumulation, Extremities  6.  Strength-Not measured    Nutrition Risk Level: High    Nutrient Needs:  · Estimated Daily Total Kcal: 7046-9857  · Estimated Daily Protein (g):  grams   · Estimated Daily Total Fluid (ml/day): 1 mL per kcal     Nutrition Diagnosis:   · Problem:  Moderate malnutrition  · Etiology: related to Insufficient

## 2019-09-13 NOTE — PROGRESS NOTES
continues to drink milk and consume dairy products    Mometasone Furoate      Other reaction(s): dry mouth    Penicillins Hives     CAN TAKE KEFLEX  Other reaction(s): Rash    Procaine Hcl      Inc heart rate    Tiotropium      Other reaction(s): dry mouth     Home Meds:  Prior to Visit Medications    Medication Sig Taking? Authorizing Provider   amitriptyline (ELAVIL) 10 MG tablet Take 10 mg by mouth nightly Yes Historical Provider, MD   warfarin (COUMADIN) 4 MG tablet TAKE 1 TABLET BY MOUTH EVERY DAY Yes James Proper, APRN - CNP   propranolol (INDERAL LA) 60 MG extended release capsule Take 60 mg by mouth every morning Yes Historical Provider, MD   oxyCODONE (OXY-IR) 15 MG immediate release tablet Take 15 mg by mouth 3 times daily. . Yes Historical Provider, MD   propranolol (INDERAL LA) 120 MG extended release capsule  Yes Historical Provider, MD   vitamin D (ERGOCALCIFEROL) 07836 UNITS CAPS capsule 50,000 Units once a week  Yes Historical Provider, MD   Tiotropium Bromide Monohydrate (SPIRIVA RESPIMAT) 2.5 MCG/ACT AERS Inhale 2 puffs into the lungs daily Yes Pema Clarke MD   venlafaxine (EFFEXOR-XR) 150 MG XR capsule Take 150 mg by mouth daily. XR Yes Historical Provider, MD   albuterol (PROVENTIL HFA;VENTOLIN HFA) 108 (90 BASE) MCG/ACT inhaler Inhale 2 puffs into the lungs every 6 hours as needed. Yes Historical Provider, MD   topiramate (TOPAMAX) 50 MG tablet Take 50 mg by mouth 2 times daily. Yes Historical Provider, MD      Past Medical History:  Past Medical History:   Diagnosis Date    Arthritis     BACK    Asthma     Atrial fibrillation ([facility] [address]     Hypertension     Migraine       Past Surgical History:    has a past surgical history that includes Ectopic pregnancy surgery; Kidney stone surgery; tumor removal; and Foot surgery (Right, 4/9/13). Social History:  Reviewed. reports that she has quit smoking. She has never used smokeless tobacco. She reports that she does not drink alcohol. 19  0520 19  0430   WBC 5.7  --  6.5 10.0   HGB 8.0* 9.3* 9.9* 9.3*   HCT 24.5* 28.2* 30.5* 28.7*   MCV 88.7  --  89.2 87.9     --  529* 616*     Thyroid: No results found for: TSH, A8MHGXZ, H4AINNP, THYROIDAB    LFTS:   Lab Results   Component Value Date    ALT 53 2019    AST 70 2019    ALKPHOS 229 2019    PROT 6.6 2019    AGRATIO 0.9 2019    BILITOT 0.6 2019     Cardiac Enzymes:   Lab Results   Component Value Date    TROPONINI <0.01 2019    TROPONINI <0.01 2019    TROPONINI <0.01 2019     Coags:   Lab Results   Component Value Date    PROTIME 12.6 2019    PROTIME 29.2 2018    INR 1.11 2019     EC19  AF RVR, rate 148, QRS 74, QTc 486    ECHO: 2018  Normal left ventricle size, wall thickness and systolic function with an EF of 55%. Mild aortic regurgitation.   Moderate mitral regurgitation. Mild tricuspid regurgitation with a estimated RVSP of 36 mmHg. Cath: None    CT Head: 19  Motion limited examination, demonstrating atrophy and small vessel ischemic disease. Persistent left mastoid effusion.   Redemonstration of mass involving the foramen ovale extending to the left  parapharyngeal space.  Some considerations include schwannoma or neurofibroma.     Problem List:   Patient Active Problem List    Diagnosis Date Noted    Acute encephalopathy     Atelectasis     Hypomagnesemia 2019    Fever     High fever     E coli bacteremia     [facility] care ([facility] [address]     Endotracheally intubated     On mechanically assisted ventilation (HCC)     Overweight     Chronic atrial fibrillation (HCC)     Permanent atrial fibrillation (HCC)     Acute abdomen     Agitation     Common bile duct dilatation     Septic shock due to Escherichia coli (HCC)     Septic shock (HCC)     Alveolar pneumonopathy (HCC)     Right upper quadrant abdominal pain     Acute respiratory failure with hypoxia (HCC)     ARDS

## 2019-09-13 NOTE — PROGRESS NOTES
Shift assessment complete. Pt remains sedated on vent, tolerating ventilation. Edema noted in both upper and lower extremities. Pt has no OG and plan for surgery unable to give oral meds.

## 2019-09-13 NOTE — PROGRESS NOTES
XRAY VIEW OF THE CHEST       9/9/2019 7:11 am       COMPARISON:   CT chest, abdomen and pelvis 09/06/2019.  Chest x-ray 09/05/2019.       HISTORY:   ORDERING SYSTEM PROVIDED HISTORY: Acute Respiratory Failure   TECHNOLOGIST PROVIDED HISTORY:   Reason for exam:->Acute Respiratory Failure   Reason for Exam: Acute Respiratory Failure   Acuity: Unknown   Type of Exam: Unknown       FINDINGS:   Lines and tubes remain in place.       Improved aeration to the lung bases with still likely a small left pleural   effusion and some left basilar atelectasis or infiltrate.  Right lung appears   to be clear.  No new airspace disease is seen.  No pneumothoraces are seen. Cardiac and mediastinal silhouettes are stable.  Stable appearance to bony   structures.           Impression   Improved aeration to the lung bases with still likely a small left pleural   effusion and some left basilar atelectasis or infiltrate. Problem List:     Acute hypoxic respiratory failure  Severe sepsis from acute cholecystitis/pancreatitis  E. coli bacteremia  Altered mental status  Atrial fibrillation  Persistent fever  Assessment/Plan:     Acute hypoxic respiratory failure, required reintubation yesterday for stridor and vocal cord edema-noted during reintubation. Plans for tracheostomy today. Continue with IV Solu-Medrol for swelling. Altered mental status possibly related to severe sepsis from acute cholecystitis and pancreatitis. Status post percutaneous cholecystostomy-draining green bile. Mild worsening of alkaline phosphatase noted. Currently on IV ciprofloxacin and Flagyl as per ID. Still has persistent low-grade fevers. Sodium 147-continues on TPN-adjust sodium. Persistent atrial fibrillation-on subcutaneous Lovenox, continue. Rate controlled    On Pepcid and Lovenox. On TPN as per surgery. Will need PEG tube at some point. Critical care team will follow.     Coreen Clinton MD     Critical care time of

## 2019-09-13 NOTE — PLAN OF CARE
Pt sedated on vent. Repositioned every 2 hours. Oxygen saturations remain greater than 90%. Plan for tracheostomy later this shift. Fingertips remain cold but good radial pulses.

## 2019-09-13 NOTE — PROGRESS NOTES
Staff in the OR concerned about the patients edema in upper extremities and the patient currently has a bracelet and 2 rings that were unable to be removed in the ICU prior to coming to the OR. Requested to speak to daughter Michelle along with RN Rafa Ventura and family is agreeable to have the rings and bracelet cut off if necessary to prevent continued swelling and potential future issues. Rafa Vetnura states he addressed previously and family was agreeable. I personally spoke with the daughter and she verbalized agreement.  We will try alternative methods to attempt to get the rings and bracelet off without cutting them first.

## 2019-09-14 NOTE — PROGRESS NOTES
Na+ level drawn as ordered. NGT #16 FR placed in L nare without difficulty. Awaiting CXR to verify placement. Detail Level: Simple Detail Level: Generalized Detail Level: Detailed

## 2019-09-14 NOTE — FLOWSHEET NOTE
See flow sheets for assessment. VSS. Sedated on vent per trach with Propofol. Monitor atrial fib with controlled VR. Opens eyes to tactile stimuli, does not follow commands, grimaces with oral care. Resists pupil check, assists vent, positive cough and gag reflexes. Trach intact, site with small amount bloody drainage. Lungs diminished throughout. Fuller with adequate urine.

## 2019-09-14 NOTE — PROGRESS NOTES
SpO2 90%   BMI 25.81 kg/m²      Gen/overall appearance: intubated, alert but somnolent  Head: Normocephalic, atraumatic  ENT: dark output from OG  CVS: regular rate and rhythm, Normal S1S2  Pulm: Clear to auscultation bilaterally. No crackles/wheezes  Gastrointestinal: Soft, nontender, nondistended, no guarding or rebound  Extremities: No edema. No erythema or warmth  Neuro: confused  Skin: Warm, dry    Labs:   Recent Labs     09/12/19  0520 09/13/19  0430 09/14/19  0555   WBC 6.5 10.0 10.7   HGB 9.9* 9.3* 9.4*   HCT 30.5* 28.7* 29.0*   * 616* 602*     Recent Labs     09/12/19  0520 09/13/19  0429 09/14/19  0555    147* 146*   K 4.4 4.1  4.1 4.1   * 115* 112*   CO2 22 23 24   BUN 20 22* 25*   CREATININE 0.6 0.6 0.6   CALCIUM 8.8 8.9 8.6   PHOS 3.2 2.8 2.9     Recent Labs     09/12/19  0520 09/13/19  0429 09/14/19  0555   AST 48* 70* 58*   ALT 29 53* 57*   BILITOT 0.6 0.6 0.6   ALKPHOS 219* 229* 201*     Recent Labs     09/12/19  0520 09/13/19 0429 09/14/19  0555   INR 1.06 1.11 0.97     No results for input(s): CKTOTAL, TROPONINI in the last 72 hours.     Assessment/Plan:    Active Hospital Problems    Diagnosis Date Noted    Moderate malnutrition ([facility] [address] [E44.0] 09/13/2019    Acute encephalopathy [G93.40]     Atelectasis [J98.11]     Hypomagnesemia [E83.42] 09/06/2019    Fever [R50.9]     High fever [R50.9]     E coli bacteremia [R78.81]     Cholecystostomy care ([facility] [address] [Z43.4]     Endotracheally intubated [Z97.8]     On mechanically assisted ventilation (HCC) [Z99.11]     Overweight [E66.3]     Chronic atrial fibrillation (HCC) [I48.2]     Permanent atrial fibrillation ([facility] [address] [I48.2]     Acute abdomen [R10.0]     Septic shock due to Escherichia coli ([facility] [address] [A41.51, R65.21]     Alveolar pneumonopathy ([facility] [address] [J84.09]     Right upper quadrant abdominal pain [R10.11]     Acute respiratory failure with hypoxia (HCC) [J96.01]     ARDS (adult respiratory distress syndrome) ([address] Katt Harvey

## 2019-09-14 NOTE — PLAN OF CARE
Problem: Falls - Risk of:  Goal: Will remain free from falls  Description  Will remain free from falls  Outcome: Ongoing  Goal: Absence of physical injury  Description  Absence of physical injury  Outcome: Ongoing     Problem: Risk for Impaired Skin Integrity  Goal: Tissue integrity - skin and mucous membranes  Description  Structural intactness and normal physiological function of skin and  mucous membranes.   Outcome: Ongoing     Problem: Discharge Planning:  Goal: Discharged to appropriate level of care  Description  Discharged to appropriate level of care  Outcome: Ongoing     Problem: Gas Exchange - Impaired:  Goal: Levels of oxygenation will improve  Description  Levels of oxygenation will improve  Outcome: Ongoing     Problem: Infection, Septic Shock:  Goal: Will show no infection signs and symptoms  Description  Will show no infection signs and symptoms  Outcome: Ongoing     Problem: Infection - Ventilator-Associated Pneumonia:  Goal: Absence of pulmonary infection  Description  Absence of pulmonary infection  Outcome: Ongoing     Problem: Serum Glucose Level - Abnormal:  Goal: Ability to maintain appropriate glucose levels will improve  Description  Ability to maintain appropriate glucose levels will improve  Outcome: Ongoing     Problem: Tissue Perfusion, Altered:  Goal: Circulatory function within specified parameters  Description  Circulatory function within specified parameters  Outcome: Ongoing     Problem: Venous Thromboembolism:  Goal: Will show no signs or symptoms of venous thromboembolism  Description  Will show no signs or symptoms of venous thromboembolism  Outcome: Ongoing  Goal: Absence of signs or symptoms of impaired coagulation  Description  Absence of signs or symptoms of impaired coagulation  Outcome: Ongoing     Problem: MECHANICAL VENTILATION  Goal: Patient will maintain patent airway  Outcome: Ongoing  Goal: Oral health is maintained or improved  Outcome: Ongoing  Goal: ET tube will be managed safely  Outcome: Ongoing  Goal: Ability to express needs and understand communication  Outcome: Ongoing     Problem: Nutrition  Goal: Optimal nutrition therapy  Outcome: Ongoing     Problem: Pain:  Description  Pain management should include both nonpharmacologic and pharmacologic interventions.   Goal: Pain level will decrease  Description  Pain level will decrease  Outcome: Ongoing  Goal: Control of acute pain  Description  Control of acute pain  Outcome: Ongoing  Goal: Control of chronic pain  Description  Control of chronic pain  Outcome: Ongoing

## 2019-09-14 NOTE — CONSULTS
Tiotropium Bromide Monohydrate (SPIRIVA RESPIMAT) 2.5 MCG/ACT AERS Inhale 2 puffs into the lungs daily 1 Inhaler 6    venlafaxine (EFFEXOR-XR) 150 MG XR capsule Take 150 mg by mouth daily. XR      albuterol (PROVENTIL HFA;VENTOLIN HFA) 108 (90 BASE) MCG/ACT inhaler Inhale 2 puffs into the lungs every 6 hours as needed.  topiramate (TOPAMAX) 50 MG tablet Take 50 mg by mouth 2 times daily. Patient denies ASA, NSAID use. Allergies  Allergies   Allergen Reactions    Alendronate Sodium      Other reaction(s): jaw pain---concern 4 thad necro, jaw pain---concern 4 thad necro (mild to moderate)    Medroxyprogesterone Acetate      Other reaction(s): depressed mood, depressed mood (mild to moderate)    Acetaminophen     Formoterol Fumarate      Other reaction(s): dry mouth    Lactose Intolerance (Gi)      pts gets diarrhea with dairy products but Daughter states pt continues to drink milk and consume dairy products    Mometasone Furoate      Other reaction(s): dry mouth    Penicillins Hives     CAN TAKE KEFLEX  Other reaction(s): Rash    Procaine Hcl      Inc heart rate    Tiotropium      Other reaction(s): dry mouth      Social   Social History     Tobacco Use    Smoking status: Former Smoker    Smokeless tobacco: Never Used    Tobacco comment: quitx26 years   Substance Use Topics    Alcohol use: No        Family History   Problem Relation Age of Onset    Heart Disease Mother     Cancer Father         kidney      No family history of colon cancer, Crohn's disease, or ulcerative colitis. Review of Systems  Pertinent items are noted in HPI. Physical Exam  Blood pressure (!) 153/77, pulse 98, temperature 99.9 °F (37.7 °C), temperature source Core, resp. rate 27, height 5' 2\" (1.575 m), weight 141 lb 1.5 oz (64 kg), SpO2 97 %.     General appearance: lethargic/sedated on Vent, tracheostomy in place, no distress, appears stated age  Eyes: Anicteric  Head: Normocephalic, without obvious

## 2019-09-14 NOTE — FLOWSHEET NOTE
Remains sedated on vent, arouses to tactile stimuli, grimaces with oral care, does not follow commands. VSS. Assessment unchanged.

## 2019-09-15 NOTE — PROGRESS NOTES
Physical/Occupational Therapy  [facility]    Orders received for PT/OT evaluation. Chart reviewed. Patient on vent support with trach. Not following any commands with intermittent agitation. Will hold therapy at this time and will follow up with pt tomorrow as medically appropriate.  Thanks, Lesly Salmon [place], DPT [id number]; Augustine Marquez, [facility] OTR/L [id number]

## 2019-09-15 NOTE — PROGRESS NOTES
Epic downtime. During downtime, patient became increasingly agitated. Precedex increase (See MAR). PRN morphine given x1 which helped significantly. Tolerating TF at goal. No other changes. Renetta Mcbride.  Chris HERRERA, RN, CCRN, CNL

## 2019-09-15 NOTE — PLAN OF CARE
be managed safely  Outcome: Ongoing  Goal: Ability to express needs and understand communication  Outcome: Ongoing     Problem: Nutrition  Goal: Optimal nutrition therapy  Outcome: Ongoing     Problem: Pain:  Goal: Pain level will decrease  Description  Pain level will decrease  Outcome: Ongoing  Goal: Control of acute pain  Description  Control of acute pain  Outcome: Ongoing  Goal: Control of chronic pain  Description  Control of chronic pain  Outcome: Ongoing

## 2019-09-15 NOTE — PROGRESS NOTES
Hospitalist Progress Note      PCP: Karlos Goldberg    Date of Admission: 8/30/2019    Chief Complaint: Abdominal pain      Subjective: Intubated and sedated. Trach in place  Arousable at times, but fairly somnolent and confused. Does not follow commands.     Medications:  Reviewed    Infusion Medications    dextrose      PN-Adult Premix 5/20 - Standard Electrolytes - Central Line 75 mL/hr at 09/14/19 1809    propofol Stopped (09/14/19 1103)    dextrose      dexmedetomidine (PRECEDEX) IV infusion 0.3 mcg/kg/hr (09/15/19 1056)    fentaNYL (SUBLIMAZE) infusion Stopped (09/09/19 0800)     Scheduled Medications    sodium chloride flush  10 mL Intravenous 2 times per day    [START ON 9/16/2019] vancomycin (VANCOCIN) IV  1,000 mg Intravenous On Call to OR    anidulafungin  100 mg Intravenous Q24H    scopolamine  1 patch Transdermal Q72H    oxyCODONE  5 mg Oral TID    methylPREDNISolone  40 mg Intravenous Q6H    [START ON 9/19/2019] fat emulsion  250 mL Intravenous Once per day on Mon Thu    insulin lispro  0-6 Units Subcutaneous Q4H    lactobacillus  1 capsule Oral BID WC    enoxaparin  70 mg Subcutaneous BID    metoprolol tartrate  50 mg Oral BID    warfarin (COUMADIN) daily dosing (placeholder)   Other RX Placeholder    ciprofloxacin  1 drop Right Eye 4 times per day    famotidine (PEPCID) injection  20 mg Intravenous BID    topiramate  50 mg Oral BID     PRN Meds: sodium chloride (PF), metoprolol, glucose, dextrose, glucagon (rDNA), dextrose, albuterol sulfate HFA, ipratropium, haloperidol lactate, potassium chloride, magnesium sulfate, morphine, magnesium hydroxide, ondansetron, acetaminophen      Intake/Output Summary (Last 24 hours) at 9/15/2019 1314  Last data filed at 9/14/2019 2200  Gross per 24 hour   Intake 1616 ml   Output 1335 ml   Net 281 ml       Exam:    BP (!) 111/58   Pulse 84   Temp 98.5 °F (36.9 °C) (Core)   Resp 13   Ht 5' 2\" (1.575 m)   Wt 141 lb 1.5 oz

## 2019-09-15 NOTE — PROGRESS NOTES
dexmedetomidine (PRECEDEX) IV infusion 0.3 mcg/kg/hr (09/15/19 1056)    fentaNYL (SUBLIMAZE) infusion Stopped (09/09/19 0800)       Data Review. Labs reviewed by me       CBC:   Recent Labs     09/13/19  0430 09/14/19  0555   WBC 10.0 10.7   HGB 9.3* 9.4*   HCT 28.7* 29.0*   MCV 87.9 89.6   * 602*     BMP:   Recent Labs     09/13/19  0429 09/14/19  0555 09/14/19  1630   * 146* 146*   K 4.1  4.1 4.1  --    * 112*  --    CO2 23 24  --    PHOS 2.8 2.9  --    BUN 22* 25*  --    CREATININE 0.6 0.6  --      Magnesium:   Lab Results   Component Value Date    MG 2.10 09/14/2019    MG 2.10 09/13/2019    MG 2.10 09/12/2019     Lab Results   Component Value Date    CREATININE 0.6 09/14/2019       Arterial Blood Gasses  No results for input(s): PH, PCO2, PO2 in the last 72 hours. Invalid input(s): Adela Arellano    UA:  No results for input(s): NITRITE, COLORU, PHUR, LABCAST, WBCUA, RBCUA, MUCUS, TRICHOMONAS, YEAST, BACTERIA, CLARITYU, SPECGRAV, LEUKOCYTESUR, UROBILINOGEN, BILIRUBINUR, BLOODU, GLUCOSEU, AMORPHOUS in the last 72 hours.     Invalid input(s): Holly Evans    LIVER PROFILE:   Recent Labs     09/13/19  0420 09/13/19  0429 09/14/19  0555   AST  --  70* 58*   ALT  --  53* 57*   LIPASE 157.0*  --   --    BILITOT  --  0.6 0.6   ALKPHOS  --  229* 201*     PT/INR:    Lab Results   Component Value Date    PROTIME 11.1 09/14/2019    PROTIME 12.6 09/13/2019    PROTIME 12.1 09/12/2019    PROTIME 29.2 09/06/2018    INR 0.97 09/14/2019    INR 1.11 09/13/2019    INR 1.06 09/12/2019     PTT:    Lab Results   Component Value Date    APTT 24.4 09/14/2019    APTT 34.3 09/13/2019    APTT 32.0 09/12/2019     NETTA:  No results found for: ANATITERNETTA  CHEMISTRY COMMON GROUP :   Lab Results   Component Value Date    GLUCOSE 165 09/14/2019     Recent Labs     09/13/19  0429 09/14/19  0555   GLUCOSE 150* 165*   CALCIUM 8.9 8.6

## 2019-09-16 NOTE — ANESTHESIA PRE PROCEDURE
Department of Anesthesiology  Preprocedure Note       Name:  Joan Edmondson   Age:  67 y.o.  :  1940                                          MRN:  [id number]         Date:  2019      Surgeon: Paris Litten):  Ishmael Frye MD    Procedure: EGD PEG TUBE PLACEMENT (N/A Abdomen)    Medications prior to admission:   Prior to Admission medications    Medication Sig Start Date End Date Taking? Authorizing Provider   oxyCODONE (OXY-IR) 15 MG immediate release tablet Take 7.5 mg by mouth nightly. Yes Historical Provider, MD   amitriptyline (ELAVIL) 10 MG tablet Take 10 mg by mouth nightly   Yes Historical Provider, MD   warfarin (COUMADIN) 4 MG tablet TAKE 1 TABLET BY MOUTH EVERY DAY 18  Yes HALEY Nicolas - CNP   propranolol (INDERAL LA) 60 MG extended release capsule Take 60 mg by mouth every morning   Yes Historical Provider, MD   oxyCODONE (OXY-IR) 15 MG immediate release tablet Take 15 mg by mouth 3 times daily. .   Yes Historical Provider, MD   propranolol (INDERAL LA) 120 MG extended release capsule  17  Yes Historical Provider, MD   vitamin D (ERGOCALCIFEROL) 04931 UNITS CAPS capsule 50,000 Units once a week  16  Yes Historical Provider, MD   Tiotropium Bromide Monohydrate (SPIRIVA RESPIMAT) 2.5 MCG/ACT AERS Inhale 2 puffs into the lungs daily 9/8/15  Yes Laureano Lewis MD   venlafaxine (EFFEXOR-XR) 150 MG XR capsule Take 150 mg by mouth daily. XR   Yes Historical Provider, MD   albuterol (PROVENTIL HFA;VENTOLIN HFA) 108 (90 BASE) MCG/ACT inhaler Inhale 2 puffs into the lungs every 6 hours as needed. Yes Historical Provider, MD   topiramate (TOPAMAX) 50 MG tablet Take 50 mg by mouth 2 times daily.    Yes Historical Provider, MD       Current medications:    Current Facility-Administered Medications   Medication Dose Route Frequency Provider Last Rate Last Dose    sodium chloride flush 0.9 % injection 10 mL  10 mL Intravenous 2 times per day Ishmael Frye MD   10 mL at 19 (placeholder)   Other Leopold Palomino, MD        ciprofloxacin (CILOXAN) 0.3 % ophthalmic solution 1 drop  1 drop Right Eye 4 times per day Nitza Quinteros MD   1 drop at 09/16/19 1207    fentaNYL (SUBLIMAZE) 1,000 mcg in sodium chloride 0.9 % 100 mL infusion  0.5 mcg/kg/hr Intravenous Continuous Nitza Quinteros MD   Stopped at 09/09/19 0800    albuterol sulfate  (90 Base) MCG/ACT inhaler 6 puff  6 puff Inhalation Q4H PRN Nitza Quinteros MD   6 puff at 09/05/19 0808    ipratropium (ATROVENT HFA) 17 MCG/ACT inhaler 6 puff  6 puff Inhalation Q4H PRN Nitza Quinteros MD   6 puff at 09/05/19 0808    famotidine (PEPCID) injection 20 mg  20 mg Intravenous BID Nitza Quinteros MD   20 mg at 09/16/19 0853    haloperidol lactate (HALDOL) injection 3 mg  3 mg Intravenous Q4H PRN Nitza Quinteros MD   3 mg at 09/01/19 1021    potassium chloride 20 mEq/50 mL IVPB (Central Line)  20 mEq Intravenous PRN Nitza Quinteros MD 50 mL/hr at 09/11/19 0656 20 mEq at 09/11/19 0656    magnesium sulfate 1 g in dextrose 5% 100 mL IVPB  1 g Intravenous PRN Nitza Quinteros MD        topiramate (TOPAMAX) tablet 50 mg  50 mg Oral BID Nitza Quinteros MD   50 mg at 09/16/19 0853    magnesium hydroxide (MILK OF MAGNESIA) 400 MG/5ML suspension 30 mL  30 mL Oral Daily PRN Nitza Quinteros MD        ondansetron [facility]) injection 4 mg  4 mg Intravenous Q6H PRN Nitza Quinteros MD        acetaminophen (TYLENOL) tablet 650 mg  650 mg Oral Q4H PRN Nitza Quinteros MD   650 mg at 09/08/19 1112       Allergies:     Allergies   Allergen Reactions    Alendronate Sodium      Other reaction(s): jaw pain---concern 4 thad necro, jaw pain---concern 4 thad necro (mild to moderate)    Medroxyprogesterone Acetate      Other reaction(s): depressed mood, depressed mood (mild to moderate)    Acetaminophen     Formoterol Fumarate 60 09/16/2019       POC Tests:   Recent Labs     09/16/19  1206   POCGLU 109*       Coags:   Lab Results   Component Value Date    PROTIME 10.6 09/16/2019    PROTIME 29.2 09/06/2018    INR 0.93 09/16/2019    APTT 26.6 09/16/2019       HCG (If Applicable): No results found for: PREGTESTUR, PREGSERUM, HCG, HCGQUANT     ABGs:   Lab Results   Component Value Date    PHART 7.464 09/10/2019    PO2ART 136.0 09/10/2019    MRX7KCC 33.8 09/10/2019    EUS1OTL 24.2 09/10/2019    BEART 0.7 09/10/2019    N1DXFZXH 99.7 09/10/2019        Type & Screen (If Applicable):  No results found for: LABABO, [address]    Anesthesia Evaluation  Patient summary reviewed no history of anesthetic complications:   Airway: Mallampati: II  TM distance: >3 FB     Mouth opening: > = 3 FB Dental:      Comment: Missing teeth    Pulmonary:   (+) COPD:  asthma:     (-) wheezes                          ROS comment: Respiratory failure  ARDS  On ventilator with trach   Cardiovascular:    (+) hypertension:, dysrhythmias (h/o afib with RVR): atrial fibrillation,         Rhythm: regular  Rate: normal                    Neuro/Psych:      (-) seizures, TIA and CVA            ROS comment: H/o encephalopathy. GI/Hepatic/Renal:   (+) hiatal hernia,           Endo/Other:                      ROS comment: sepsis Abdominal:           Vascular:                                        Anesthesia Plan      general     ASA 4       Induction: intravenous. Anesthetic plan and risks discussed with healthcare power of . Plan discussed with CRNA.                   Terrence Green MD   9/16/2019

## 2019-09-16 NOTE — ANESTHESIA POSTPROCEDURE EVALUATION
Department of Anesthesiology  Postprocedure Note    Patient: Nino Keith  MRN: [id number]  YOB: 1940  Date of evaluation: 9/16/2019  Time:  6:12 PM     Procedure Summary     Date:  09/16/19 Room / Location:  [facility] VIRTUAL ENDO / [facility] ENDOSCOPY    Anesthesia Start:  1700 Anesthesia Stop:  1395    Procedure:  EGD PEG TUBE PLACEMENT (N/A Abdomen) Diagnosis:  (MS/ Dysphagia)    Surgeon:  Kimberlee Carreon MD Responsible Provider:  Adia Culver MD    Anesthesia Type:  general ASA Status:  4          Anesthesia Type: general    Darek Phase I: Darek Score: 3    Darek Phase II:      Last vitals: Reviewed and per EMR flowsheets.        Anesthesia Post Evaluation    Patient location during evaluation: ICU  Patient participation: complete - patient cannot participate  Level of consciousness: sedated and ventilated, responsive to light touch and responsive to physical stimuli  Airway patency: patent  Nausea & Vomiting: no vomiting  Complications: no  Cardiovascular status: hemodynamically stable  Respiratory status: acceptable and ventilator  Hydration status: euvolemic

## 2019-09-16 NOTE — PROGRESS NOTES
pulmonary edema. No significant change of bilateral pleural effusions and bibasilar airspace   disease         XR CHEST PORTABLE   Final Result   Bilateral edema or pneumonia, similar to prior, with small bilateral pleural   effusions. Support apparatus as above. XR CHEST PORTABLE   Final Result   Findings suggestive of worsening pleural-parenchymal disease when compared to   the study of 9/1/2019 as described above. XR CHEST 1 VW   Final Result   1. Tip of the endotracheal tube is above the julian. Additional support   hardware, as detailed above. 2. Enlarged cardiomediastinal silhouette with findings suggestive of   pulmonary interstitial edema. Correlation with volume status is recommended. CT ABDOMEN PELVIS WO CONTRAST Additional Contrast? None   Final Result   Expected appearance of the cholecystostomy drain, with no significant   hematoma or edema along the extrahepatic course of the drain. The coil of   the drain is located within the gallbladder lumen, and the gallbladder is now   decompressed. Mildly hyperdense gallstones are again detected. Evidence of increasing anasarca, with increasing bilateral pleural effusions   with adjacent collapsing consolidation, increasing mesenteric and   retroperitoneal fat stranding, and with increasing subcutaneous fat stranding. CT ABSCESS DRAINAGE W CATH PLACEMENT S&I   Final Result   Findings consistent with acute, calculous cholecystitis. Successful percutaneous cholecystostomy tube by transhepatic approach. XR CHEST PORTABLE   Final Result   Bilateral airspace disease, increased in the left retrocardiac region         XR CHEST PORTABLE   Final Result   Endotracheal tube is in the expected position. Nasogastric tube appears to be within the distal esophagus. That should be   advanced approximately 12 additional cm. Location of this was discussed with   Dr. John Gorman at 8:20 p.m. on 08/30/2019.

## 2019-09-16 NOTE — PLAN OF CARE
Patient repositioned every 2 hours. Patient remains on vent. Pts respirations decreased, vent switched back to full support. Pt tolerating well.

## 2019-09-16 NOTE — PROGRESS NOTES
[address]  Patient has size # 8 Shiley. Trach Kit of same size on standby  Yes, Obturator at head of the bed  Yes. Inner cannula cleaned/replaced Yes, drain sponge changed  Yes, Trach tie replaced is soiled No, Flange cleaned  Yes. [address] performed without complications.  Comment: lots of thick drainage

## 2019-09-16 NOTE — PROGRESS NOTES
Shift assessment complete. Pt responds to name but unable to follow commands. Pt remains on light sedation. Vent continues on spontaneous settings. Plan for PEG later this shift.

## 2019-09-16 NOTE — PROGRESS NOTES
acetaminophen      Assessment:  Acute cholecystitis with possible cholangitis and choledocholithiasis, status-post percutaneous cholecystostomy   Status-post tracheostomy tube placement on 9/13/2019 for respiratory failure with ventilator dependence  Sepsis  Respiratory failure  Pancreatitis  Encephalopathy  Electrolyte derangement       Plan:  1. No indication for surgery at this time, abdomen remains stable, drain output is clear/bilious without signs of infection, labs stable; LFT stable  2. Cholecystomy tube care/monitoring  3. NPO for PEG tube placement; monitor and correct electrolytes  4. Antibiotics per ID  5. Activity as tolerated, Q2 hour turning  6. Ventilator management and weaning per ICU team  7. PRN analgesics and antiemetics--per protocol while on ventilator, minimizing narcotics as tolerated  8. DVT prophylaxis with Lovenox  9. Management of medical comorbid etiologies per primary team and consulting services  10. Disposition: Discharge planning    EDUCATION:  Deferred as patient remains intubated/sedated, no family at bedside. Reviewed and discussed with Dr. Dio Hernandez.       Signed:  HALEY Burgess - CNP  9/16/2019 1:13 PM     Surg Staff:   Pt seen and examined with NP  See full note above  Trach site looks fine  Abd is benign to exam, and PEG being placed today  Cont drain and atbx for cholecystitis    Ping Nuñez

## 2019-09-16 NOTE — PROGRESS NOTES
Physical Therapy  Deanne Delgado PT/OT evaluation today per discussion with nursing, Vladimir Anthony. Will follow up tomorrow.   Thanks, Artie Payment, [address]

## 2019-09-16 NOTE — CARE COORDINATION
Discharge Planning Assessment  SW met with pt's daughter, Ramon Carrrea, to discuss reason for admission, current living situation, and potential needs at the time of discharge    Demographics/Insurance verified Yes/yes    Current type of dwelling: Private home    Patient from ECF/SW confirmed with: n/a    Living arrangements: Lives with daughter Ramon Carrera and Su's spouse. Bedroom on 2nd floor    Level of function/Support: Independent prior to admission, pt was walking short distances with daughter in neighborhood on regular basis. PCP:Dorina Salcido    Last Visit to PCP:    DME: None reported    Active with any community resources/agencies/skilled home care: no    Medication compliance issues: no    Financial issues that could impact healthcare: no    Transportation at the time of discharge: Will need medical transport    Tentative discharge plan: Per rounds with team, Palliative care VS LTAC VS SNF. Pt may need Trach. Daughter requesting referral to [facility]. Referral initiated. Jamel Goodell with [facility] to follow.     Lesia Zuniga MSW, [address]
Maryjo Osler with [facility] states that patient can come to [facility] after she has PEG tube and is weaned off Precedex drip. Maryjo Osler states he already spoke with daughter.      [facility]  [phone]
Penicillins Hives     CAN TAKE KEFLEX  Other reaction(s): Rash    Procaine Hcl      Inc heart rate    Tiotropium      Other reaction(s): dry mouth       MEDICATIONS    No current facility-administered medications on file prior to encounter. Current Outpatient Medications on File Prior to Encounter   Medication Sig Dispense Refill    amitriptyline (ELAVIL) 10 MG tablet Take 10 mg by mouth nightly      warfarin (COUMADIN) 4 MG tablet TAKE 1 TABLET BY MOUTH EVERY DAY 90 tablet 0    propranolol (INDERAL LA) 60 MG extended release capsule Take 60 mg by mouth every morning      oxyCODONE (OXY-IR) 15 MG immediate release tablet Take 15 mg by mouth 3 times daily. Inis Ducking propranolol (INDERAL LA) 120 MG extended release capsule       vitamin D (ERGOCALCIFEROL) 73082 UNITS CAPS capsule 50,000 Units once a week   2    Tiotropium Bromide Monohydrate (SPIRIVA RESPIMAT) 2.5 MCG/ACT AERS Inhale 2 puffs into the lungs daily 1 Inhaler 6    venlafaxine (EFFEXOR-XR) 150 MG XR capsule Take 150 mg by mouth daily. XR      albuterol (PROVENTIL HFA;VENTOLIN HFA) 108 (90 BASE) MCG/ACT inhaler Inhale 2 puffs into the lungs every 6 hours as needed.  topiramate (TOPAMAX) 50 MG tablet Take 50 mg by mouth 2 times daily.          Objective    BP (!) 118/53   Pulse 86   Temp 99.1 °F (37.3 °C) (Core)   Resp 18   Ht 5' 2\" (1.575 m)   Wt 140 lb 6.9 oz (63.7 kg)   SpO2 100%   BMI 25.69 kg/m²     LABS:  WBC:    Lab Results   Component Value Date    WBC 7.5 09/10/2019     H/H:    Lab Results   Component Value Date    HGB 9.4 09/10/2019    HCT 28.9 09/10/2019     PTT:    Lab Results   Component Value Date    APTT 51.3 09/10/2019   [APTT}  PT/INR:    Lab Results   Component Value Date    PROTIME 12.7 09/10/2019    PROTIME 29.2 09/06/2018    INR 1.11 09/10/2019     HgBA1c:    Lab Results   Component Value Date    LABA1C 5.5 01/21/2015       Assessment   Francisco J Risk Score: Francisco J Scale Score: 12    Patient Active Problem List

## 2019-09-16 NOTE — PROGRESS NOTES
day    famotidine (PEPCID) injection  20 mg Intravenous BID    topiramate  50 mg Oral BID      propofol Stopped (09/14/19 1103)    dextrose      dexmedetomidine (PRECEDEX) IV infusion 0.5 mcg/kg/hr (09/16/19 0631)    fentaNYL (SUBLIMAZE) infusion Stopped (09/09/19 0800)       Data Review. Labs reviewed by me       CBC:   Recent Labs     09/14/19  0555 09/16/19  0533   WBC 10.7 9.9   HGB 9.4* 8.9*   HCT 29.0* 27.5*   MCV 89.6 89.2   * 453*     BMP:   Recent Labs     09/14/19  0555 09/14/19  1630 09/15/19  1609 09/16/19  0533   * 146* 142 142   K 4.1  --  4.2 4.9   *  --  111* 108   CO2 24  --  23 24   PHOS 2.9  --   --   --    BUN 25*  --  42* 42*   CREATININE 0.6  --  0.6 0.6     Magnesium:   Lab Results   Component Value Date    MG 2.40 09/16/2019    MG 2.10 09/14/2019    MG 2.10 09/13/2019     Lab Results   Component Value Date    CREATININE 0.6 09/16/2019       Arterial Blood Gasses  No results for input(s): PH, PCO2, PO2 in the last 72 hours. Invalid input(s): Amado Hart    UA:  No results for input(s): NITRITE, COLORU, PHUR, LABCAST, WBCUA, RBCUA, MUCUS, TRICHOMONAS, YEAST, BACTERIA, CLARITYU, SPECGRAV, LEUKOCYTESUR, UROBILINOGEN, BILIRUBINUR, BLOODU, GLUCOSEU, AMORPHOUS in the last 72 hours.     Invalid input(s): Chidi Heredia    LIVER PROFILE:   Recent Labs     09/14/19  0555 09/16/19  0533   AST 58* 50*   ALT 57* 60*   BILITOT 0.6 0.6   ALKPHOS 201* 178*     PT/INR:    Lab Results   Component Value Date    PROTIME 10.6 09/16/2019    PROTIME 11.1 09/14/2019    PROTIME 12.6 09/13/2019    PROTIME 29.2 09/06/2018    INR 0.93 09/16/2019    INR 0.97 09/14/2019    INR 1.11 09/13/2019     PTT:    Lab Results   Component Value Date    APTT 26.6 09/16/2019    APTT 24.4 09/14/2019    APTT 34.3 09/13/2019     NETTA:  No results found for: ANATITER, NETTA  CHEMISTRY COMMON GROUP :   Lab Results   Component Value Date    GLUCOSE 163 09/16/2019     Recent Labs     09/14/19  0555

## 2019-09-16 NOTE — PROGRESS NOTES
09/16/19 1956   Vent Patient Data   Plateau Pressure 17 [id number]   Static Compliance 28 mL/cmH2O   Dynamic Compliance 25.5 mL/cmH2O

## 2019-09-16 NOTE — PROGRESS NOTES
crackles/wheezes  Gastrointestinal: Soft, nontender, nondistended, no guarding or rebound  Extremities: No edema. No erythema or warmth  Neuro: sedated  Skin: Warm, dry    Labs:   Recent Labs     09/14/19  0555 09/16/19  0533   WBC 10.7 9.9   HGB 9.4* 8.9*   HCT 29.0* 27.5*   * 453*     Recent Labs     09/14/19  0555 09/14/19  1630 09/15/19  1609 09/16/19  0533   * 146* 142 142   K 4.1  --  4.2 4.9   *  --  111* 108   CO2 24  --  23 24   BUN 25*  --  42* 42*   CREATININE 0.6  --  0.6 0.6   CALCIUM 8.6  --  8.3 8.7   PHOS 2.9  --   --   --      Recent Labs     09/14/19  0555 09/16/19  0533   AST 58* 50*   ALT 57* 60*   BILITOT 0.6 0.6   ALKPHOS 201* 178*     Recent Labs     09/14/19  0555 09/16/19  0533   INR 0.97 0.93     No results for input(s): Toutle Sink in the last 72 hours.     Assessment/Plan:    Active Hospital Problems    Diagnosis Date Noted    Moderate malnutrition ([facility] [E44.0] 09/13/2019    Acute encephalopathy [G93.40]     Atelectasis [J98.11]     Hypomagnesemia [E83.42] 09/06/2019    Fever [R50.9]     High fever [R50.9]     E coli bacteremia [R78.81]     Cholecystostomy care ([facility] [Z43.4]     Endotracheally intubated [Z97.8]     On mechanically assisted ventilation (HCC) [Z99.11]     Overweight [E66.3]     Chronic atrial fibrillation (HCC) [I48.2]     Permanent atrial fibrillation (HCC) [I48.2]     Acute abdomen [R10.0]     Septic shock due to Escherichia coli ([facility] [A41.51, R65.21]     Alveolar pneumonopathy ([facility] [J84.09]     Right upper quadrant abdominal pain [R10.11]     Acute respiratory failure with hypoxia (HCC) [J96.01]     ARDS (adult respiratory distress syndrome) ([facility] [J80]     Calculus of gallbladder with acute cholecystitis without obstruction [K80.00]     Sepsis ([facility] [A41.9] 08/30/2019    Essential hypertension [I10] 10/07/2018    Atrial fibrillation with rapid ventricular response (HCC) [I48.91] 09/06/2018    COPD (chronic obstructive

## 2019-09-16 NOTE — OP NOTE
[facility]  Endoscopy Note    Patient: Vinita Ulloa   : 1940  [facility]#: [id number]    Procedure: Esophagogastroduodenoscopy with percutaneous endoscopic gastrostomy tube placement. Date:  2019     Surgeon: Renee Ly MD    Referring Physician:   DAWNA Bland    Preoperative Diagnosis:   Oropharyngeal dysphagia    Postoperative Diagnosis:   Oropharyngeal dysphagia    Anesthesia:  Diprivan/MAC per Anesthesia. EBL: <50 mL    Indications: This is a 67y.o. year old female who presents today with oropharyngeal dysphagia. Procedure: Informed consent was obtained from the patient and the patient's power of  after explanation of indications, benefits, possible risks and complications of the procedure. The patient was then taken to the endoscopy suite, placed in the supine position with the head of the bed elevated at 45 degrees, and the above IV anesthesia was administered by the Anesthesia Department. An Olympus video endoscope was place in the patient's mouth, and under direct visualization passed into the esophagus. Visualization of the esophagus demonstrated normal mucosa. Scope was then advanced into the stomach. Visualization of the gastric body and antrum demonstrated normal mucosa. The pylorus was patent, and the scope was advance into the duodenum. Visualization of the duodenal bulb demonstrated normal mucosa. The second portion of the duodenum was also normal.  The scope was then withdrawn back into the stomach. Transillumination and finger denting were accomplished successfully through the skin of the anterior abdominal wall in the left upper quadrant. This area was marked, prepped and draped in sterile fashion and infiltrated with 1% Xylocaine solution. A 1 cm transverse skin incision was made with #11 scalpel blade. A #14 gauge Angiocath was then passed through the skin incision puncturing the gastric lumen under endoscopic visualization.   The

## 2019-09-17 NOTE — PROGRESS NOTES
dry mouth    Lactose Intolerance (Gi)      pts gets diarrhea with dairy products but Daughter states pt continues to drink milk and consume dairy products    Mometasone Furoate      Other reaction(s): dry mouth    Penicillins Hives     CAN TAKE KEFLEX  Other reaction(s): Rash    Procaine Hcl      Inc heart rate    Tiotropium      Other reaction(s): dry mouth         Assessment:     The patient is a 67 y.o. old female who  has a past medical history of Arthritis, Asthma, Atrial fibrillation ([address] Hypertension, and Migraine. with following problems:      · E. coli bacteremia--this has been treated adequately  · Acute cholecystitis with cholelithiasis with common bile duct dilatation status post cholecystostomy by IR-cholecystostomy drain fluid cell cultures negative-no indication of antibiotic  · Fever-this is waxing and waning, nonspecific in nature  · Transaminitis-resolved  · Acute respiratory failure with hypoxia--improving  · Endotracheally intubated  · Requiring invasive mechanical ventilation-now status post tracheostomy  · Overweight due to excess calorie intake : Body mass index is 23.87 kg/m². Discussion:      I had stopped her antifungal yesterday. White cell count is 13,100 today. It is likely postprocedural after PEG tube placement. She has been having on and off nonspecific fever spikes. No real source of fever at this time. Cholecystostomy drain fluid has remained negative. Repeat blood cultures have been sent today by primary team.  Chest x-ray reviewed. No pneumonia. Plan:     Diagnostic Workup:    · Continue to follow  fever curve, WBC count and blood cultures  · Follow up on liver and renal function  · Old PICC line being removed today due to malfunction. Send tip for culture    Antimicrobials:    · Fever spikes have been nonspecific  · No evidence of any ongoing infectious process at this time  · No indication of antibiotics at this time.   The patient has already received a prolonged antibiotic course without much changes in her fever with that  · We will continue to monitor her off antibiotics  · Neurological status has not shown much progress  · Cholecystostomy drain site care  · Tracheostomy site care and pulmonary toilet  · PEG tube site care  · Discussed all above with medical ICU team      Drug Monitoring:    · Continue monitoring for antibiotic toxicity as follows: CMP  · Continue to watch for following: new or worsening fever, new hypotension, hives, lip swelling and redness or purulence at vascular access sites. I/v access Management:    · Continue to monitor i.v access sites for erythema, induration, discharge or tenderness. · As always, continue efforts to minimize tubes/lines/drains as clinically appropriate to reduce chances of line associated infections. Level of complexity: High     TIME SPENT TODAY:     - Spent over  35  minutes on visit (including interval history, physical exam, review of data including labs, cultures, imaging, development and implementation of treatment plan and coordination of complex care). - Over 50% of time spent with patient face to face on counseling and education. Thank you for involving me in the care of your patient. I will continue to follow. If you have anyadditional questions, please do not hesitate to contact me. Subjective: Interval history: Patient was seen and examined at bedside. Interval history was obtained. The patient is again having fever today. She had a PEG tube placement done yesterday. She has tolerated the procedure well okay otherwise      REVIEW OF SYSTEMS:      Review of Systems   Unable to perform ROS: Mental status change       Past Medical History: All past medical history reviewed today.     Past Medical History:   Diagnosis Date    Arthritis     BACK    Asthma     Atrial fibrillation ([facility] [address]     Hypertension     Migraine        Past Surgical History: All past surgical history was

## 2019-09-17 NOTE — PROGRESS NOTES
Shift assessment complete. Patient is alert but unable to follow commands. Patient continues to make non-purposeful movements. Pt remains on Precedex. Pt respiratory rate tachy. Vent switched to spontaneous setting. Pt tolerating well.

## 2019-09-17 NOTE — PROGRESS NOTES
Information:  · Nutrition-Focused Physical Findings: +2 pitting BUE edema. +1 RLE edema. +2 LLE edema. -6.6 liters. Rounded, soft abdomen. Active bowel sounds. · Wound Type: None  · Current Nutrition Therapies:  · Oral Diet Orders: NPO   · Oral Diet intake: NPO  · Oral Nutrition Supplement (ONS) Orders: None  · ONS intake: NPO  · Tube Feeding (TF) Orders:   · Feeding Route: Gastrostomy  · Formula: Standard without Fiber  · Rate (ml/hr):60 mL per hour    · Volume (ml/day): 1440 mL  · Duration: Continuous  · Water Flushes: 200 mL q 4 hours   · Current TF & Flush Orders Provides: As below  · Goal TF & Flush Orders Provides: Osmolite 1.2 at 60 mL per hour to provide 1728 calories, 80 grams protein, 1181 mL free water.   · Additional Calories: None  · Anthropometric Measures:  · Ht: 5' 2\" (157.5 cm)   · Current Body Wt: 146 lb (66.2 kg)(Continue to use weight 146 lbs as RN does not believe weight 130 lbs is correct )  · Admission Body Wt: 147 lb (66.7 kg)  · Ideal Body Wt: 110 lb (49.9 kg)   · BMI Classification: BMI 18.5 - 24.9 Normal Weight    Nutrition Interventions:   Continue current Tube Feeding  Continued Inpatient Monitoring, Education Not Indicated    Nutrition Evaluation:   · Evaluation: Goals set   · Goals: Pt will tolerate EN at goal    · Monitoring: TF Intake, TF Tolerance, I&O, Weight, Pertinent Labs      Electronically signed by Gretel Maloney RD, LD on 9/17/19 at 9:43 AM    Contact Number: [phone]

## 2019-09-17 NOTE — PROGRESS NOTES
opacities bilaterally, may be related to pulmonary edema   versus pneumonia. Mild left pleural effusion. CT ABDOMEN PELVIS W IV CONTRAST Additional Contrast? None   Final Result   Moderate motion degradation. The gallbladder is dilated, with cholelithiasis noted. Common duct also is   dilated, which is new when compared to the previous exam.  There is suspected   mild pericholecystic edema, but motion is present. All-in-all, cholecystitis   is a concern. Very small bilateral pleural effusions. Patulous fluid-filled distal esophagus. Correlate with clinical evidence of   reflux and possible esophagitis. Nonobstructing right-sided nephrolithiasis. Small inguinal hernia contains several short knuckles of small bowel, but   without evidence obstruction. CT ABDOMEN PELVIS W IV CONTRAST Additional Contrast? Oral    (Results Pending)           Assessment/Plan:    Active Hospital Problems    Diagnosis    Acute encephalopathy [G93.40]    Atelectasis [J98.11]    Hypomagnesemia [E83.42]    Fever [R50.9]    E coli bacteremia [R78.81]    Chronic atrial fibrillation (HCC) [I48.2]    Permanent atrial fibrillation (HCC) [I48.2]    Acute abdomen [R10.0]    Septic shock due to Escherichia coli ([facility]) [A41.51, R65.21]    Alveolar pneumonopathy ([address] [J84.09]    Acute respiratory failure with hypoxia ([facility]) [J96.01]    ARDS (adult respiratory distress syndrome) ([facility]) [J80]    Acute cholecystitis [K81.0]    Essential hypertension [I10]    Atrial fibrillation with rapid ventricular response ([facility]) [I48.91]    COPD (chronic obstructive pulmonary disease) ([facility]) [J44.9]    Hypertriglyceridemia [E78.1]     PLAN:    Acute cholecystitis  Has cholecystostomy   Poor candidate for cholecystectomy  Antibiotic course completed yesterday. Infectious disease and gastroenterology following    Fever  Low-grade fever has recurred since yesterday  Continue to monitor.   Defer to infectious

## 2019-09-17 NOTE — PROGRESS NOTES
Reassessment complete. Patient remains lightly sedated on the vent. She is awake and calm in bed. Has not gone to sleep yet. Still unable to follow commands. PEG tube residual 10 mL. All bowel sounds now active. No other acute changes. Breath sounds still clear, but diminished. Still in A fib. Does not appear to be in any pain at this time.

## 2019-09-17 NOTE — PROGRESS NOTES
09/17/19 0730   Vent Patient Data   Plateau Pressure 17 [id number]   Static Compliance 47.5 mL/cmH2O   Dynamic Compliance 35.6 mL/cmH2O

## 2019-09-17 NOTE — PROGRESS NOTES
Labs     09/15/19  1609 09/16/19  0533 09/17/19  0500   GLUCOSE 164* 163* 121*   CALCIUM 8.3 8.7 8.8

## 2019-09-17 NOTE — PROGRESS NOTES
Spoke with Dr. Gibran Hernandez concerning PICC line, okay for guidewire exchange or to have new PICC placed.

## 2019-09-18 NOTE — PROGRESS NOTES
Bed bath given. Soap, water, lotion, deodorant. Face, leggett, and blaire-care. Bed pad and gown changed. Patient expresses distress/discomfort when her feet are touched. Precedex turned off after bath and repositioning, to see how patient tolerates. Will continue to monitor.

## 2019-09-18 NOTE — PROGRESS NOTES
Tech at bedside to perform arterial doppler studies due to toes cyanotic at time of MD assessment. Perfusion to LE improved without further cyanosis. Feet remain cool but pulses remain palpable. Awaiting doppler results.

## 2019-09-18 NOTE — PROGRESS NOTES
Hospitalist Progress Note      PCP: Charlesetta Carrel    Date of Admission: 8/30/2019    Chief Complaint: Confusion, weakness    Hospital Course: Admitted with confusion. Diagnosed with sepsis. Cholecystostomy tube placed for acute cholecystitis not amenable to surgical treatment. PEG tube had to be inserted because patient has dysphasia. Patient recently completed his antibiotic course. Could not tolerate TF. Held and Reglan started  New onset of bilateral toe cyanosis noted    Subjective: Review of systems is impossible because patient cannot respond.       Medications:  Reviewed    Infusion Medications    sodium chloride 100 mL/hr at 09/18/19 1002    propofol Stopped (09/14/19 1103)    dextrose      dexmedetomidine (PRECEDEX) IV infusion Stopped (09/18/19 0252)    fentaNYL (SUBLIMAZE) infusion Stopped (09/09/19 0800)     Scheduled Medications    methylPREDNISolone  40 mg Intravenous Daily    LORazepam  0.5 mg Intravenous Q4H    warfarin  2 mg Oral Once per day on Mon Wed Fri    And    warfarin  4 mg Oral Once per day on Sun Tue Thu Sat    lidocaine 1 % injection  5 mL Intradermal Once    sodium chloride flush  10 mL Intravenous 2 times per day    sodium chloride flush  10 mL Intravenous 2 times per day    sodium chloride flush  10 mL Intravenous 2 times per day    scopolamine  1 patch Transdermal Q72H    oxyCODONE  5 mg Oral TID    [START ON 9/19/2019] fat emulsion  250 mL Intravenous Once per day on Mon Thu    insulin lispro  0-6 Units Subcutaneous Q4H    lactobacillus  1 capsule Oral BID WC    enoxaparin  70 mg Subcutaneous BID    metoprolol tartrate  50 mg Oral BID    ciprofloxacin  1 drop Right Eye 4 times per day    famotidine (PEPCID) injection  20 mg Intravenous BID    topiramate  50 mg Oral BID     PRN Meds: morphine, sodium chloride flush, sodium chloride flush, sodium chloride (PF), metoprolol, glucose, dextrose, glucagon (rDNA), dextrose, albuterol sulfate HFA, still likely a small left pleural   effusion and some left basilar atelectasis or infiltrate. CT CHEST ABDOMEN PELVIS W CONTRAST   Final Result   Small to moderate left and small right pleural effusions with partial   consolidation in the lower lobes. Findings may be related to infection or   atelectasis. Mild soft tissue edema in the abdomen and pelvis as well as small volume   ascites in the pelvis. External drain terminating in the gallbladder. No biliary dilatation. XR CHEST PORTABLE   Final Result   Increased pulmonary edema. No significant change of bilateral pleural effusions and bibasilar airspace   disease         XR CHEST PORTABLE   Final Result   Bilateral edema or pneumonia, similar to prior, with small bilateral pleural   effusions. Support apparatus as above. XR CHEST PORTABLE   Final Result   Findings suggestive of worsening pleural-parenchymal disease when compared to   the study of 9/1/2019 as described above. XR CHEST 1 VW   Final Result   1. Tip of the endotracheal tube is above the julian. Additional support   hardware, as detailed above. 2. Enlarged cardiomediastinal silhouette with findings suggestive of   pulmonary interstitial edema. Correlation with volume status is recommended. CT ABDOMEN PELVIS WO CONTRAST Additional Contrast? None   Final Result   Expected appearance of the cholecystostomy drain, with no significant   hematoma or edema along the extrahepatic course of the drain. The coil of   the drain is located within the gallbladder lumen, and the gallbladder is now   decompressed. Mildly hyperdense gallstones are again detected. Evidence of increasing anasarca, with increasing bilateral pleural effusions   with adjacent collapsing consolidation, increasing mesenteric and   retroperitoneal fat stranding, and with increasing subcutaneous fat stranding.          CT ABSCESS DRAINAGE W CATH PLACEMENT S&I   Final shock due to Escherichia coli ([facility]) [A41.51, R65.21]    Alveolar pneumonopathy [address] [address] [J84.09]    Acute respiratory failure with hypoxia ([facility]) [J96.01]    ARDS (adult respiratory distress syndrome) ([facility]) [J80]    Calculus of gallbladder with acute cholecystitis without obstruction [K80.00]    Essential hypertension [I10]    Atrial fibrillation with rapid ventricular response ([facility]) [I48.91]    COPD (chronic obstructive pulmonary disease) ([facility] [address] [J44.9]    Hypertriglyceridemia [E78.1]     PLAN:    Cyanosis of the toes  Acute. Did not have this condition earlier  Both feet are cold, with no pulses obtained manually  Off anticoagulation because of PEG insertion site bleeding  Discussed with the patient's daughter  Stat arterial dopplers of lower extremities ordered    Acute cholecystitis  Has cholecystostomy   Poor candidate for cholecystectomy  Antibiotic course completed   Infectious disease and gastroenterology following    Fever  Low-grade fever   Continue to monitor. Defer to infectious disease  Ct abdomen and pelvis repeated yesterday. Found with no significant changes    Oropharyngeal dysphagia  Tolerated PEG insertion, but had high residues when TF attempted  TF now on hold. Reglan started    Hyperglycemia  Continue sliding scale insulin    Prerenal acute kidney injury  BUN still elevated. Nephrology following. Daily basic metabolic panel ordered. Anemia  Stable. No signs of active blood loss. Likely chronic illness. Continue to monitor. Transfuse as needed. Chronic atrial fibrillation  Controlled heart rate. Lovenox on hold    Discussed with nursing  D/w patient's daughter at bedside    DVT Prophylaxis: SCD  Diet: Diet NPO Effective Now  Code Status: Full Code    PT/OT Eval Status: NA uncooperative    Dispo -inpatient.   Consider LTAC when medically stable     Bree Vance MD

## 2019-09-18 NOTE — PROGRESS NOTES
Infectious Diseases   Progress Note      Admission Date: 8/30/2019  Hospital Day: Hospital Day: 21 .cur  Attending: Kun Ray MD  Date of service: 9/3/19     Chief complaint/ Reason for consult: The patient was seen today for the following:    · Septic shock on admission  · E. coli bacteremia  · Acute cholecystitis with cholelithiasis with common bile duct dilatation status post cholecystostomy by IR  · Transaminitis    Microbiology:        I have reviewed all available micro lab data and cultures    · Blood culture (2/2) - collected on 8/30/2019: E. coli    Escherichia coli (2)     Antibiotic Interpretation RICHAR Status    ampicillin Sensitive 8 mcg/mL     ceFAZolin Sensitive <=4 mcg/mL     cefepime Sensitive <=0.12 mcg/mL     cefTRIAXone Sensitive <=0.25 mcg/mL     ciprofloxacin Sensitive <=0.25 mcg/mL     gentamicin Sensitive <=1 mcg/mL     levofloxacin Sensitive <=0.12 mcg/mL     piperacillin-tazobactam Sensitive <=4 mcg/mL     trimethoprim-sulfamethoxazole Sensitive <=20 mcg/mL           Antibiotics and immunizations:       Current antibiotics: All antibiotics and their doses were reviewed by me    Recent Abx Admin      No antibiotic orders with administrations found. Immunization History: All immunization history was reviewed by me today. Immunization History   Administered Date(s) Administered    Influenza Virus Vaccine 10/15/2014    Influenza Whole 10/01/2012    Pneumococcal Conjugate 13-valent ([id number]) 02/05/2015    Pneumococcal Polysaccharide ([id number]) 10/15/2012       Known drug allergies:      All allergies were reviewed and updated    Allergies   Allergen Reactions    Alendronate Sodium      Other reaction(s): jaw pain---concern 4 thad necro, jaw pain---concern 4 thad necro (mild to moderate)    Medroxyprogesterone Acetate      Other reaction(s): depressed mood, depressed mood (mild to moderate)    Acetaminophen     Formoterol Fumarate      Other reaction(s): Oral BID        sodium chloride 100 mL/hr at 09/18/19 1002    propofol Stopped (09/14/19 1103)    dextrose      dexmedetomidine (PRECEDEX) IV infusion Stopped (09/18/19 0252)    fentaNYL (SUBLIMAZE) infusion Stopped (09/09/19 0800)       morphine, sodium chloride flush, sodium chloride flush, sodium chloride (PF), metoprolol, glucose, dextrose, glucagon (rDNA), dextrose, albuterol sulfate HFA, ipratropium, haloperidol lactate, potassium chloride, magnesium sulfate, magnesium hydroxide, ondansetron, acetaminophen      Problem list:       Patient Active Problem List   Diagnosis Code    Rena M20.40    Migraine G43.909    Hypertriglyceridemia E78.1    Asthma J45.909    Chronic back pain M54.9, G89.29    Respiratory failure with hypercapnia ([facility] [address] J96.92    Hiatal hernia K44.9    Emphysema lung ([facility] [address] J43.9    COPD (chronic obstructive pulmonary disease) ([facility]) J44.9    Mass of right forearm R22.31    Otalgia of both ears H92.03    Atrial fibrillation with rapid ventricular response ([facility]) I48.91    Essential hypertension I10    Sepsis ([facility]) A41.9    Right upper quadrant abdominal pain R10.11    Acute respiratory failure with hypoxia ([facility]) J96.01    ARDS (adult respiratory distress syndrome) ([facility]) J80    Acute cholecystitis K81.0    Acute abdomen R10.0    Agitation R45.1    Common bile duct dilatation K83.8    Septic shock due to Escherichia coli ([facility]) A41.51, R65.21    Septic shock ([facility]) A41.9, R65.21    Alveolar pneumonopathy ([facility]) J84.09    E coli bacteremia R78.81    Cholecystostomy care ([facility] [address] Z43.4    Endotracheally intubated Z97.8    On mechanically assisted ventilation ([facility]) Z99.11    Overweight E66.3    Chronic atrial fibrillation (HCC) I48.2    Permanent atrial fibrillation ([facility]) I48.2    High fever R50.9    Hypomagnesemia E83.42    Fever R50.9    Atelectasis J98.11    Acute encephalopathy G93.40       Please note that this chart was generated using Dragon dictation

## 2019-09-18 NOTE — PROGRESS NOTES
32.3 32.2 32.0   RDW 14.8 14.8 14.7   BANDSPCT 2  --  1      BMP:  Recent Labs     09/16/19  0533 09/17/19  0500 09/18/19  0545    140 140   K 4.9 4.5 4.0  4.0    104 106   CO2 24 24 25   BUN 42* 41* 41*   CREATININE 0.6 0.6 0.6   CALCIUM 8.7 8.8 9.0   GLUCOSE 163* 121* 89      ABG:  No results for input(s): PHART, MRV7NKG, PO2ART, PQX1TWV, D8RFVHIG, BEART in the last 72 hours. Cultures:    Abx:    Radiology Review:  Pertinent images / reports were reviewed as a part of this visit. Assessment:     1. Acute hypoxemic respiratory failure  2. Severe sepsis/E. coli bacteremia  3. Acute cholecystitis/pancreatitis  4. Atrial fibrillation    Tube feeding was stopped over concern for high residuals although she had been on tube feeding at goal for several days without difficulty. CT abdomen pelvis yesterday did not reveal significant change  At the moment receiving no tube feeding, no TPN and no IV fluid  Resume half-normal saline at 100 cc/h  Would favor resuming tube feeding at a lower rate  Will discuss with surgery on intermittent low-grade temperatures have persisted. She is not on antibiotics at this point. Procalcitonin was low  ID is following  Remains on Solu-Medrol once daily  We will consider stopping that in another 24 hours.

## 2019-09-19 NOTE — PROGRESS NOTES
follow along with you. Aisha Garcia MD       [facility] [address]   Phone: [phone] or [address]  Fax: [phone]               Subjective:   Remains in ICU , s/p trach and on ventilator. hyperNA remains improved  HTN better controlled   RAO remains improved. Ongoing Pending dispo to LTACH       Objective:      EXAM  Vitals:    09/19/19 0727   BP:    Pulse: 118   Resp: 21   Temp:    SpO2: 100%       Intake/Output Summary (Last 24 hours) at 9/19/2019 0902  Last data filed at 9/18/2019 2200  Gross per 24 hour   Intake 1634 ml   Output 1630 ml   Net 4 ml     HEENT - s/p trach and on vent  CVS.  Heart sounds are normal.  RS. Coarse b/l  PA soft , bowel sounds are normal no distension and no tenderness to palpation. Ext-1+ edema        Principal Problem:    Acute respiratory failure with hypoxia (HCC)  Active Problems:    Hypertriglyceridemia    COPD (chronic obstructive pulmonary disease) (HCC)    Atrial fibrillation with rapid ventricular response (HCC)    Essential hypertension    ARDS (adult respiratory distress syndrome) (HCC)    Acute cholecystitis    Acute abdomen    Septic shock due to Escherichia coli (HCC)    Alveolar pneumonopathy (HCC)    E coli bacteremia    Chronic atrial fibrillation (HCC)    Permanent atrial fibrillation (HCC)    Hypomagnesemia    Fever    Atelectasis    Acute encephalopathy  Resolved Problems:    * No resolved hospital problems.  *        Medications Reviewed by troy Balderas methylPREDNISolone  40 mg Intravenous Daily    LORazepam  0.5 mg Intravenous Q4H    warfarin  2 mg Oral Once per day on Mon Wed Fri    And    warfarin  4 mg Oral Once per day on Sun Tue Thu Sat    lidocaine 1 % injection  5 mL Intradermal Once    sodium chloride flush  10 mL Intravenous 2 times per day    sodium chloride flush  10 mL Intravenous 2 times per day    sodium chloride flush  10 mL Intravenous 2 times per day    scopolamine  1 patch Transdermal Q72H    oxyCODONE 5 mg Oral TID    fat emulsion  250 mL Intravenous Once per day on Mon Thu    insulin lispro  0-6 Units Subcutaneous Q4H    lactobacillus  1 capsule Oral BID WC    enoxaparin  70 mg Subcutaneous BID    metoprolol tartrate  50 mg Oral BID    ciprofloxacin  1 drop Right Eye 4 times per day    famotidine (PEPCID) injection  20 mg Intravenous BID    topiramate  50 mg Oral BID      sodium chloride 100 mL/hr at 09/18/19 2012    propofol Stopped (09/14/19 1103)    dextrose      dexmedetomidine (PRECEDEX) IV infusion Stopped (09/18/19 0252)    fentaNYL (SUBLIMAZE) infusion Stopped (09/09/19 0800)       Data Review. Labs reviewed by me       CBC:   Recent Labs     09/17/19  0500 09/18/19  0545 09/19/19  0505   WBC 13.1* 11.8* 10.8   HGB 9.3* 8.8* 8.1*   HCT 28.9* 27.4* 24.5*   MCV 88.7 87.2 87.7   * 494* 403     BMP:   Recent Labs     09/17/19  0500 09/18/19  0545 09/19/19  0505    140 138   K 4.5 4.0  4.0 3.9    106 107   CO2 24 25 21   PHOS 4.1 3.6 3.0   BUN 41* 41* 31*   CREATININE 0.6 0.6 0.7     Magnesium:   Lab Results   Component Value Date    MG 2.10 09/19/2019    MG 2.20 09/18/2019    MG 2.30 09/17/2019     Lab Results   Component Value Date    CREATININE 0.7 09/19/2019       Arterial Blood Gasses  No results for input(s): PH, PCO2, PO2 in the last 72 hours. Invalid input(s): Adela Arellano    UA:  No results for input(s): NITRITE, COLORU, PHUR, LABCAST, WBCUA, RBCUA, MUCUS, TRICHOMONAS, YEAST, BACTERIA, CLARITYU, SPECGRAV, LEUKOCYTESUR, UROBILINOGEN, BILIRUBINUR, BLOODU, GLUCOSEU, AMORPHOUS in the last 72 hours.     Invalid input(s): Holly Evans    LIVER PROFILE:   Recent Labs     09/17/19  0500 09/17/19  1018 09/18/19  0545 09/19/19  0505   AST 78*  --  53* 50*   ALT 81*  --  72* 67*   LIPASE  --  103.0*  --   --    BILITOT 0.9  --  0.8 0.9   ALKPHOS 197*  --  204* 224*     PT/INR:    Lab Results   Component Value Date    PROTIME 12.0 09/19/2019    PROTIME 11.2 09/18/2019    PROTIME 11.2 09/17/2019    PROTIME 29.2 09/06/2018    INR 1.05 09/19/2019    INR 0.98 09/18/2019    INR 0.98 09/17/2019     PTT:    Lab Results   Component Value Date    APTT 22.3 09/19/2019    APTT 22.1 09/18/2019    APTT 24.7 09/17/2019     NETTA:  No results found for: ANATITER, NETTA  CHEMISTRY COMMON GROUP :   Lab Results   Component Value Date    GLUCOSE 91 09/19/2019     Recent Labs     09/17/19  0500 09/18/19  0545 09/19/19  0505   GLUCOSE 121* 89 91   CALCIUM 8.8 9.0 8.1*

## 2019-09-19 NOTE — PROGRESS NOTES
Pt assessment completed and documented- see doc flowsheet. Pt trac/vent tolerating well. Lungs clear and dimished, abdomen soft with active bowel sounds. VSS, medications given per MAR. Updated on POC. White board updated. Denies further needs at this time. Will continue to monitor.

## 2019-09-19 NOTE — PROGRESS NOTES
Infectious Diseases   Progress Note      Admission Date: 8/30/2019  Hospital Day: Hospital Day: 24 .cur  Attending: Antione Noble MD  Date of service: 9/3/19     Chief complaint/ Reason for consult: The patient was seen today for the following:    · Septic shock on admission  · E. coli bacteremia  · Acute cholecystitis with cholelithiasis with common bile duct dilatation status post cholecystostomy by IR  · Transaminitis    Microbiology:        I have reviewed all available micro lab data and cultures    · Blood culture (2/2) - collected on 8/30/2019: E. coli    Escherichia coli (2)     Antibiotic Interpretation RICHAR Status    ampicillin Sensitive 8 mcg/mL     ceFAZolin Sensitive <=4 mcg/mL     cefepime Sensitive <=0.12 mcg/mL     cefTRIAXone Sensitive <=0.25 mcg/mL     ciprofloxacin Sensitive <=0.25 mcg/mL     gentamicin Sensitive <=1 mcg/mL     levofloxacin Sensitive <=0.12 mcg/mL     piperacillin-tazobactam Sensitive <=4 mcg/mL     trimethoprim-sulfamethoxazole Sensitive <=20 mcg/mL           Antibiotics and immunizations:       Current antibiotics: All antibiotics and their doses were reviewed by me    Recent Abx Admin      No antibiotic orders with administrations found. Immunization History: All immunization history was reviewed by me today. Immunization History   Administered Date(s) Administered    Influenza Virus Vaccine 10/15/2014    Influenza Whole 10/01/2012    Pneumococcal Conjugate 13-valent ([id number]) 02/05/2015    Pneumococcal Polysaccharide ([id number]) 10/15/2012       Known drug allergies:      All allergies were reviewed and updated    Allergies   Allergen Reactions    Alendronate Sodium      Other reaction(s): jaw pain---concern 4 thad necro, jaw pain---concern 4 thad necro (mild to moderate)    Medroxyprogesterone Acetate      Other reaction(s): depressed mood, depressed mood (mild to moderate)    Acetaminophen     Formoterol Fumarate      Other reaction(s): dry mouth    Lactose Intolerance (Gi)      pts gets diarrhea with dairy products but Daughter states pt continues to drink milk and consume dairy products    Mometasone Furoate      Other reaction(s): dry mouth    Penicillins Hives     CAN TAKE KEFLEX  Other reaction(s): Rash    Procaine Hcl      Inc heart rate    Tiotropium      Other reaction(s): dry mouth         Assessment:     The patient is a 67 y.o. old female who  has a past medical history of Arthritis, Asthma, Atrial fibrillation ([address] Hypertension, and Migraine. with following problems:      · E. coli bacteremia--this has been treated  · Acute cholecystitis with cholelithiasis with common bile duct dilatation status post cholecystostomy by IR-cholecystostomy drain fluid cell cultures negative-had a cholangiogram done today  · Fever-fever curve coming down  · Transaminitis-resolved  · Acute respiratory failure with hypoxia--chronic, ongoing  · Endotracheally intubated  · Requiring invasive mechanical ventilation-tracheostomy site okay  · Overweight due to excess calorie intake : Body mass index is 23.2 kg/m². Discussion:      The patient had a fever up to 100.5 yesterday morning and fever curve has been coming down after that. White cell count has also come down to 10,800 with taper of steroids, which is reassuring. Lumbar puncture was done today to rule out any occult meningoencephalitis given encephalopathy and fever. CSF white cell count was 0, glucose was normal at 57. Protein slightly elevated at 72 in the CSF.   These findings are reassuring that are not suggestive of an infectious process there    Blood cultures, tracheal aspirate cultures are against a negative    Plan:     Diagnostic Workup:    · Follow-up on pending CSF labs  · Continue to follow  fever curve, WBC count and blood cultures  · Follow up on liver and renal function    Antimicrobials:    · CSF findings are reassuring  · No indications of any ongoing infectious Additional Contrast? None   Final Result   Expected appearance of the cholecystostomy drain, with no significant   hematoma or edema along the extrahepatic course of the drain. The coil of   the drain is located within the gallbladder lumen, and the gallbladder is now   decompressed. Mildly hyperdense gallstones are again detected. Evidence of increasing anasarca, with increasing bilateral pleural effusions   with adjacent collapsing consolidation, increasing mesenteric and   retroperitoneal fat stranding, and with increasing subcutaneous fat stranding. CT ABSCESS DRAINAGE W CATH PLACEMENT S&I   Final Result   Findings consistent with acute, calculous cholecystitis. Successful percutaneous cholecystostomy tube by transhepatic approach. XR CHEST PORTABLE   Final Result   Bilateral airspace disease, increased in the left retrocardiac region         XR CHEST PORTABLE   Final Result   Endotracheal tube is in the expected position. Nasogastric tube appears to be within the distal esophagus. That should be   advanced approximately 12 additional cm. Location of this was discussed with   Dr. Yulissa Anderson at 8:20 p.m. on 08/30/2019. Pulmonary vascular congestion with patchy bilateral airspace disease,   concerning for pulmonary edema. XR CHEST PORTABLE   Final Result   Mild cardiomegaly. Patchy airspace opacities bilaterally, may be related to pulmonary edema   versus pneumonia. Mild left pleural effusion. CT ABDOMEN PELVIS W IV CONTRAST Additional Contrast? None   Final Result   Moderate motion degradation. The gallbladder is dilated, with cholelithiasis noted. Common duct also is   dilated, which is new when compared to the previous exam.  There is suspected   mild pericholecystic edema, but motion is present. All-in-all, cholecystitis   is a concern. Very small bilateral pleural effusions. Patulous fluid-filled distal esophagus.   Correlate with

## 2019-09-19 NOTE — PROGRESS NOTES
[facility]        Progress Note    Pt Name: Tremaine Estes  MRN: [id number]  YOB: 1940  Date of evaluation: 9/19/2019    Chief Complaint: Abdominal pain      Subjective:    No acute events overnight  No issues with trach  Does not follow commands  TF recently restarted at 20 mL/hour      Post-Op Day #5      Vital Signs:  Patient Vitals for the past 24 hrs:   BP Temp Temp src Pulse Resp SpO2 Height Weight   09/19/19 1139 -- -- -- 94 18 100 % -- --   09/19/19 0727 -- -- -- 118 21 100 % -- --   09/19/19 0600 (!) 120/56 -- -- 113 21 100 % -- --   09/19/19 0500 137/72 -- -- 140 20 100 % -- --   09/19/19 0400 (!) 153/99 98.3 °F (36.8 °C) Temporal 134 13 100 % 5' 4\" (1.626 m) 135 lb 2.3 oz (61.3 kg)   09/19/19 0336 -- -- -- 113 21 100 % -- --   09/19/19 0300 (!) 150/83 -- -- 111 22 100 % -- --   09/19/19 0201 (!) 157/79 -- -- 129 16 -- -- --   09/19/19 0100 (!) 144/65 -- -- 104 17 -- -- --   09/19/19 0000 (!) 139/59 97.1 °F (36.2 °C) Temporal 98 19 98 % -- --   09/18/19 2358 -- -- -- 99 18 96 % -- --   09/18/19 2357 -- -- -- 118 19 -- -- --   09/18/19 2300 120/68 -- -- 110 11 -- -- --   09/18/19 2200 136/65 -- -- 94 21 100 % -- --   09/18/19 2135 -- -- -- 90 19 100 % -- --   09/18/19 2100 135/85 -- -- 88 15 100 % -- --   09/18/19 2000 134/63 96.9 °F (36.1 °C) Temporal 114 14 100 % -- --   09/18/19 1959 -- -- Temporal -- -- -- -- --   09/18/19 1900 (!) 150/89 -- -- 110 12 100 % -- --   09/18/19 1800 139/64 98.7 °F (37.1 °C) Temporal 112 14 100 % -- --   09/18/19 1700 122/60 -- -- 112 16 99 % -- --   09/18/19 1626 116/78 98.7 °F (37.1 °C) Temporal 121 15 99 % -- --   09/18/19 1605 -- -- -- -- 14 100 % -- --   09/18/19 1600 116/78 99.1 °F (37.3 °C) Temporal 117 24 100 % -- --   09/18/19 1500 118/63 -- -- 116 21 100 % -- --   09/18/19 1401 135/76 99.6 °F (37.6 °C) Temporal 107 17 100 % -- --   09/18/19 1400 135/76 -- -- 107 17 100 % -- --   09/18/19 1300 139/60 -- -- rectum. BONES/SOFT TISSUES: There is no acute osseous abnormality. Impression:     Cholecystostomy tube is position within the gallbladder.  No abnormality  noted of the pancreas. Scheduled Meds:   LORazepam  0.5 mg Intravenous Q4H    warfarin  2 mg Oral Once per day on Mon Wed Fri    And    warfarin  4 mg Oral Once per day on Sun Tue Thu Sat    lidocaine 1 % injection  5 mL Intradermal Once    sodium chloride flush  10 mL Intravenous 2 times per day    sodium chloride flush  10 mL Intravenous 2 times per day    sodium chloride flush  10 mL Intravenous 2 times per day    scopolamine  1 patch Transdermal Q72H    oxyCODONE  5 mg Oral TID    insulin lispro  0-6 Units Subcutaneous Q4H    lactobacillus  1 capsule Oral BID WC    enoxaparin  70 mg Subcutaneous BID    metoprolol tartrate  50 mg Oral BID    famotidine (PEPCID) injection  20 mg Intravenous BID    topiramate  50 mg Oral BID     Continuous Infusions:   sodium chloride 100 mL/hr at 09/18/19 2012    propofol Stopped (09/14/19 1103)    dextrose      dexmedetomidine (PRECEDEX) IV infusion Stopped (09/18/19 0252)    fentaNYL (SUBLIMAZE) infusion Stopped (09/09/19 0800)     PRN Meds:.morphine, sodium chloride flush, sodium chloride flush, sodium chloride (PF), metoprolol, glucose, dextrose, glucagon (rDNA), dextrose, albuterol sulfate HFA, ipratropium, haloperidol lactate, potassium chloride, magnesium sulfate, magnesium hydroxide, ondansetron, acetaminophen      Assessment:  Acute cholecystitis with possible cholangitis and choledocholithiasis, status-post percutaneous cholecystostomy   Status-post tracheostomy tube placement on 9/13/2019 for respiratory failure with ventilator dependence  Sepsis  Respiratory failure  Pancreatitis  Encephalopathy  Electrolyte derangement       Plan:  Doing OK on TF at 20 ml / hr today - restart at 30 after LPdone.   Will check flouro study today for cholecystostomy and patency of CBD  Load of stool in rectum on CT. No BM recorded.  Will do enema    Ya Peralta

## 2019-09-19 NOTE — PROGRESS NOTES
Physical/Occupational Therapy  Kerry Agosto  Orders received, chart reviewed. Attempted PT/OT evaluation this date. Pt with LP scheduled for this date. Per discussion with RN, Terrence Urbano, therapy to hold evaluations and re-attempt tomorrow.    [id number] [facility] PT, DPT [id number]   Dony Delatorre, OTR/L, [id number]

## 2019-09-19 NOTE — PROGRESS NOTES
using accessory muscles. Good inspiratory effort. Clear to auscultation bilaterally, no wheeze or crackles. GI: Abdomen soft, no tenderness, not distended, normal bowel sounds, PEG tube intact in epigastric area  Musculoskeletal: Normal muscle tone DTRs intact    Neurology: CN 2-12 grossly intact. No speech or motor deficits  Psych: Normal affect. On ventilator not following commands   Skin: Warm, dry, normal turgor    Labs and Tests:  CBC:   Recent Labs     09/17/19  0500 09/18/19  0545 09/19/19  0505   WBC 13.1* 11.8* 10.8   HGB 9.3* 8.8* 8.1*   * 494* 403     BMP:  Recent Labs     09/17/19  0500 09/18/19  0545 09/19/19  0505    140 138   K 4.5 4.0  4.0 3.9    106 107   CO2 24 25 21   BUN 41* 41* 31*   CREATININE 0.6 0.6 0.7   GLUCOSE 121* 89 91     Hepatic: Recent Labs     09/17/19  0500 09/18/19  0545 09/19/19  0505   AST 78* 53* 50*   ALT 81* 72* 67*   BILITOT 0.9 0.8 0.9   ALKPHOS 197* 204* 224*         Problem List  Principal Problem:    Acute respiratory failure with hypoxia (HCC)  Active Problems:    Hypertriglyceridemia    COPD (chronic obstructive pulmonary disease) (HCC)    Atrial fibrillation with rapid ventricular response (HCC)    Essential hypertension    ARDS (adult respiratory distress syndrome) (HCC)    Acute cholecystitis    Acute abdomen    Septic shock due to Escherichia coli (HCC)    Alveolar pneumonopathy (HCC)    E coli bacteremia    Chronic atrial fibrillation (HCC)    Permanent atrial fibrillation (HCC)    Hypomagnesemia    Fever    Atelectasis    Acute encephalopathy  Resolved Problems:    * No resolved hospital problems. *       Assessment & Plan: 1. Cyanosis of the toes Acute. Did not have this condition earlier  Both feet are cold, with no pulses obtained manually  Off anticoagulation because of PEG insertion site bleeding Discussed with the patient's daughter Stat arterial dopplers of lower extremities ordered  2. Acute cholecystitis  Has cholecystostomy  Poor

## 2019-09-20 NOTE — PROGRESS NOTES
Max:99      Intake/Output:    I/O last 3 completed shifts: In: 9166 [I.V.:2185; NG/GT:364]  Out: 3780 [Urine:2730; Drains:165]  No intake/output data recorded. Drain/tube Output:    Closed/Suction Drain Right RUQ Bulb 8.5 Spanish-Output (ml): 55 ml      Physical Exam:  General: awake, alert, on vent, unable to answer questions  Cardiac: regular rate and rhythm   Pulmonary: clear to auscultation bilaterally   Abdomen: soft, non-distended, non-tender drain in place with appropriate biliary output  Wound: trach site clean, no drainage or signs of infection    Labs:  CBC:    Recent Labs     09/18/19  0545 09/19/19  0505 09/20/19  0455   WBC 11.8* 10.8 9.3   HGB 8.8* 8.1* 8.0*   HCT 27.4* 24.5* 24.1*   * 403 370     BMP:    Recent Labs     09/18/19  0545 09/19/19  0505 09/20/19  0455    138 142   K 4.0  4.0 3.9 4.1    107 111*   CO2 25 21 21   BUN 41* 31* 25*   CREATININE 0.6 0.7 0.7   GLUCOSE 89 91 116*     Hepatic:   Recent Labs     09/18/19  0545 09/19/19  0505 09/20/19  0455   AST 53* 50* 63*   ALT 72* 67* 79*   BILITOT 0.8 0.9 0.8   ALKPHOS 204* 224* 245*     Amylase: No results for input(s): AMYLASE in the last 72 hours. Lipase:   No results for input(s): LIPASE in the last 72 hours.   Mag:      Recent Labs     09/18/19  0545 09/19/19  0505 09/20/19  0455   MG 2.20 2.10 2.10      Phos:     Recent Labs     09/18/19  0545 09/19/19  0505 09/20/19  0455   PHOS 3.6 3.0 3.6      Coags:   Recent Labs     09/18/19  0545 09/19/19  0505 09/20/19  0455   INR 0.98 1.05 1.18*   APTT 22.1* 22.3* 28.4       Cultures:  Relevant cultures documented under results section     Pathology:   No relevant pathology     Imaging:  I have personally reviewed the following films:    CT ABDOMEN PELVIS W IV CONTRAST Additional Contrast? Oral [[id number]]    Collected: 09/17/19 2055    Updated: 09/17/19 2116    Narrative:     EXAMINATION:  CT OF THE ABDOMEN AND PELVIS WITH CONTRAST 9/17/2019 6:49 pm    TECHNIQUE:  CT of the abdomen and pelvis was performed with the administration of  intravenous contrast. Multiplanar reformatted images are provided for review. Dose modulation, iterative reconstruction, and/or weight based adjustment of  the mA/kV was utilized to reduce the radiation dose to as low as reasonably  achievable. COMPARISON:  None. HISTORY:  ORDERING SYSTEM PROVIDED HISTORY: Follow up eval gallbladder and pancreas  TECHNOLOGIST PROVIDED HISTORY:  Additional Contrast?->Oral  Reason for Exam: Follow up eval gallbladder and pancreas  Acuity: Acute  Type of Exam: Initial  Relevant Medical/Surgical History: Follow up eval gallbladder and pancreas    FINDINGS:  LUNG BASES: There are no focal consolidations or pleural effusions. HEPATOBILIARY: The liver is normal in size. There are no focal hepatic  lesions. There is no biliary ductal dilatation.  A cholecystostomy tube is  again noted within the gallbladder. SPLEEN: Multiple calcifications are again noted throughout the spleen from  prior granulomatous disease. Ayden Rodriguez PANCREAS: Unremarkable    ADRENAL GLANDS: Unremarkable. KIDNEYS: Kidneys are normal in size and contour and demonstrate symmetric  enhancement.  There is no hydronephrosis.  A few nonobstructing calculi are  again seen in the right kidney, grossly unchanged    ABDOMINAL NODES: No adenopathy is appreciated. PELVIC ORGANS: The urinary bladder is collapsed around a Fuller catheter. PERITONEUM/MESENTERY/BOWEL: The stomach is unremarkable. Espiridion Amel is no bowel  obstruction. There is no bowel wall thickening. Espiridion Amel is a large amount of  stool seen within the rectum. BONES/SOFT TISSUES: There is no acute osseous abnormality. Impression:     Cholecystostomy tube is position within the gallbladder.  No abnormality  noted of the pancreas.        Scheduled Meds:   LORazepam  0.5 mg Intravenous Q4H    warfarin  2 mg Oral Once per day on Mon Wed Fri    And    warfarin  4 mg Oral Once per day on Sun Tue Thu

## 2019-09-20 NOTE — PROGRESS NOTES
142   K 4.0  4.0 3.9 4.1    107 111*   CO2 25 21 21   BUN 41* 31* 25*   CREATININE 0.6 0.7 0.7   CALCIUM 9.0 8.1* 8.1*   GLUCOSE 89 91 116*        Hepatic Function Panel:   Lab Results   Component Value Date    ALKPHOS 245 09/20/2019    ALT 79 09/20/2019    AST 63 09/20/2019    PROT 5.6 09/20/2019    BILITOT 0.8 09/20/2019    BILIDIR 2.7 08/31/2019    IBILI -0.2 08/31/2019    LABALBU 2.9 09/20/2019       CPK: No results found for: CKTOTAL  ESR: No results found for: SEDRATE  CRP: No results found for: CRP        Imaging: All pertinent images and reports for the current visit were reviewed by me during this visit. IR INJ PERC CHOLA EXIST ACCESS W IMAGING   Final Result   Patent cystic duct and common bile duct. Percutaneous cholecystostomy catheter is in appropriate position. Several   stones are seen within the decompressed gallbladder. IR LUMBAR PUNCTURE FOR DIAGNOSIS   Final Result   Successful fluoroscopic-guided lumbar puncture. VL CARLA BILATERAL LIMITED 1-2 LEVELS   Final Result      CT ABDOMEN PELVIS W IV CONTRAST Additional Contrast? Oral   Final Result   Cholecystostomy tube is position within the gallbladder. No abnormality   noted of the pancreas. XR CHEST PORTABLE   Final Result   Interval placement of a right PICC line in good position. Unchanged malpositioned left PICC line with its distal end coiled within the   right brachiocephalic vein. XR CHEST PORTABLE   Final Result   Persistent malposition of the PICC line coiled on itself with the tip in the   right innominate vein. XR CHEST PORTABLE   Final Result   Malpositioned left upper extremity PICC, looping in the right brachiocephalic   vein. Repositioning is suggested. Grossly clear lungs. XR CHEST PORTABLE   Final Result   No acute process.       NG tube tip and side-port in the region of the gastric fundus         XR CHEST PORTABLE   Final Result   Mild cardiomegaly and

## 2019-09-20 NOTE — PROGRESS NOTES
[facility] PROGRESS NOTE    9/20/2019 11:21 AM        Name: Tremaine Estes . Admitted: 8/30/2019  Primary Care Provider: Mariposa Rodrigez (Tel: [phone])                        Hospital Course: Admitted with confusion.  Diagnosed with sepsis.  Cholecystostomy tube placed for acute cholecystitis not amenable to surgical treatment.  PEG tube had to be inserted because patient has dysphasia. Patient recently completed his antibiotic course. Could not tolerate TF on 9/18, held and Reglan started. New onset of bilateral toe cyanosis noted, no evidence of significant arterial insufficiency noted  In BLE. Lumbar puncture done on 9/19     Subjective:  . No acute events overnight. Resting well. Pain control. Diet ok. Labs reviewed  Denies any chest pain sob.      Reviewed interval ancillary notes    Current Medications    0.45 % sodium chloride infusion Continuous   LORazepam (ATIVAN) injection 0.5 mg Q4H   morphine (PF) injection 5 mg Q4H PRN   warfarin (COUMADIN) tablet 2 mg Once per day on Mon Wed Fri   And    warfarin (COUMADIN) tablet 4 mg Once per day on Sun Tue Thu Sat   lidocaine PF 1 % injection 5 mL Once   sodium chloride flush 0.9 % injection 10 mL 2 times per day   sodium chloride flush 0.9 % injection 10 mL PRN   sodium chloride flush 0.9 % injection 10 mL 2 times per day   sodium chloride flush 0.9 % injection 10 mL PRN   metoprolol (LOPRESSOR) injection 5 mg Q6H PRN   scopolamine (TRANSDERM-SCOP) transdermal patch 1 patch Q72H   oxyCODONE (ROXICODONE) immediate release tablet 5 mg TID   propofol injection Titrated   insulin lispro (HUMALOG) injection pen 0-6 Units Q4H   glucose (GLUTOSE) 40 % oral gel 15 g PRN   dextrose 50 % IV solution PRN   glucagon (rDNA) injection 1 mg PRN   dextrose 5 % solution PRN   lactobacillus (CULTURELLE) capsule 1 capsule

## 2019-09-20 NOTE — PROGRESS NOTES
MD Li Holt MD Clayborne Pro, MD                                  Office: [phone]                 Fax: [phone]          [email or web address]                    NEPHROLOGY ICU PROGRESS NOTE:     PATIENT NAME: Janessa Lion  : 1940  MRN: [id number]    Assessment and Plan   Acute kidney injury-improved. Likely prerenal/ischemic versus toxic- ATN in the setting of severe sepsis, cholecystitis, bacteremia, and hypernatremia with dehydration,  Estimated Creatinine Clearance: 52 mL/min (based on SCr of 0.7 mg/dL). Remains improved     Azotemia - improving now after below   -  likely due to steroids, ? GIB w/ lower Hb, some component of pre-renal -- as has been net negative   - tapering off steroids   - started IVFs w/ hypotonic - 0.45% Saline   - continue to monitor for now     Hypernatremia- due to free water deficit  - Persistent. Now has tube feedings. Free water 200ml every 4 hours thru feeding tube. --- keep same   Improved    Polyuria : >7L UOP / 24 hour. Has now improved. -Uosm 154 and urine lytes noted. Partial DI like picture. Vs post-ATN diuresis  S/P Desmopressin. Some improvement in polyuria. No need for further Desmopressin at this time. Remains improved now       Encephalopathy --- not uremic, mx per ID, [facility], medicine team   LP noted. BP is acceptable. Hypokalemia - resolved  Hypomagnesemia- resolved  Hypophosphatemia- resolved  Hypocalcemia - replace per protocol    Severe sepsis , E. coli bacteremia with acute cholecystitis status post drain. No plans for OR per surgery  Acute hypoxic severe sepsis with respiratory failure. Intubated on ventilator. Now has trach  AMS - encephalopathy -- not improving  Guarded prognosis       Stable from renal perspective, will continue to follow peripherally  Pending dispo to [facility], [facility]        Discussed with ICU team   High complexity. Multiple medical problems.     Thank you for allowing me to participate in this

## 2019-09-21 NOTE — PLAN OF CARE
Problem: Falls - Risk of:  Goal: Will remain free from falls  Description  Will remain free from falls  Outcome: Ongoing  Goal: Absence of physical injury  Description  Absence of physical injury  Outcome: Ongoing     Problem: Risk for Impaired Skin Integrity  Goal: Tissue integrity - skin and mucous membranes  Description  Structural intactness and normal physiological function of skin and  mucous membranes.   Outcome: Ongoing     Problem: Discharge Planning:  Goal: Discharged to appropriate level of care  Description  Discharged to appropriate level of care  Outcome: Ongoing     Problem: Gas Exchange - Impaired:  Goal: Levels of oxygenation will improve  Description  Levels of oxygenation will improve  Outcome: Ongoing     Problem: Infection, Septic Shock:  Goal: Will show no infection signs and symptoms  Description  Will show no infection signs and symptoms  Outcome: Ongoing     Problem: Infection - Ventilator-Associated Pneumonia:  Goal: Absence of pulmonary infection  Description  Absence of pulmonary infection  Outcome: Ongoing     Problem: Serum Glucose Level - Abnormal:  Goal: Ability to maintain appropriate glucose levels will improve  Description  Ability to maintain appropriate glucose levels will improve  Outcome: Ongoing     Problem: Tissue Perfusion, Altered:  Goal: Circulatory function within specified parameters  Description  Circulatory function within specified parameters  Outcome: Ongoing     Problem: Venous Thromboembolism:  Goal: Will show no signs or symptoms of venous thromboembolism  Description  Will show no signs or symptoms of venous thromboembolism  Outcome: Ongoing  Goal: Absence of signs or symptoms of impaired coagulation  Description  Absence of signs or symptoms of impaired coagulation  Outcome: Ongoing     Problem: MECHANICAL VENTILATION  Goal: Patient will maintain patent airway  Outcome: Ongoing  Goal: Oral health is maintained or improved  Outcome: Ongoing  Goal: ET tube will

## 2019-09-21 NOTE — PROGRESS NOTES
[facility]        Progress Note    Pt Name: Byron Licea  MRN: [id number]  YOB: 1940  Date of evaluation: 9/21/2019    Chief Complaint: Abdominal pain      Subjective:    No acute events overnight  No issues with trach  Does not follow commands  TF on at 40 mL/hour, tolerating without high residuals      Post-Op Day #8--trach      Vital Signs:  Patient Vitals for the past 24 hrs:   BP Temp Temp src Pulse Resp SpO2 Height Weight   09/21/19 0800 (!) 154/92 98.3 °F (36.8 °C) Temporal -- -- -- -- --   09/21/19 0728 -- -- -- 107 -- -- -- --   09/21/19 0600 (!) 106/95 -- -- 97 22 100 % -- --   09/21/19 0500 128/63 -- -- 110 24 100 % -- --   09/21/19 0423 -- -- -- 91 21 100 % -- --   09/21/19 0422 -- -- -- 100 18 100 % 5' 2\" (1.575 m) 126 lb 1.7 oz (57.2 kg)   09/21/19 0400 133/65 97.7 °F (36.5 °C) Temporal 100 20 100 % -- --   09/21/19 0350 -- -- -- 101 18 100 % -- --   09/21/19 0300 (!) 122/56 -- -- 107 19 99 % -- --   09/21/19 0200 137/72 -- -- 116 22 99 % -- --   09/21/19 0100 (!) 141/71 -- -- 103 19 100 % -- --   09/21/19 0000 122/61 98.3 °F (36.8 °C) Temporal 102 16 100 % -- --   09/20/19 2331 -- -- -- 96 14 100 % -- --   09/20/19 2300 (!) 111/53 -- -- 90 19 100 % -- --   09/20/19 2200 (!) 115/49 -- -- 89 16 99 % -- --   09/20/19 2100 (!) 154/82 -- -- 126 19 100 % -- --   09/20/19 2000 137/69 97.7 °F (36.5 °C) Temporal 112 24 100 % -- --   09/20/19 1955 -- -- -- -- 21 100 % -- --   09/20/19 1954 -- -- -- 109 20 100 % -- --   09/20/19 1900 128/85 -- -- 129 22 100 % -- --   09/20/19 1800 (!) 120/57 -- -- 111 19 100 % -- --   09/20/19 1700 (!) 122/51 -- -- 112 21 100 % -- --   09/20/19 1600 124/67 -- -- 129 29 100 % -- --   09/20/19 1500 (!) 121/58 -- -- 99 26 100 % -- --   09/20/19 1400 132/64 -- -- 121 23 100 % -- --   09/20/19 1300 (!) 115/57 -- -- 105 22 100 % -- --   09/20/19 1200 (!) 125/47 -- -- 91 23 100 % -- --   09/20/19 1100 (!) 110/59 -- -- 87 21 Cholecystostomy tube is position within the gallbladder.  No abnormality  noted of the pancreas. Scheduled Meds:   LORazepam  0.5 mg Intravenous Q4H    warfarin  2 mg Oral Once per day on Mon Wed Fri    And    warfarin  4 mg Oral Once per day on Sun Tue Thu Sat    lidocaine 1 % injection  5 mL Intradermal Once    sodium chloride flush  10 mL Intravenous 2 times per day    sodium chloride flush  10 mL Intravenous 2 times per day    scopolamine  1 patch Transdermal Q72H    oxyCODONE  5 mg Oral TID    insulin lispro  0-6 Units Subcutaneous Q4H    lactobacillus  1 capsule Oral BID WC    enoxaparin  70 mg Subcutaneous BID    metoprolol tartrate  50 mg Oral BID    famotidine (PEPCID) injection  20 mg Intravenous BID    topiramate  50 mg Oral BID     Continuous Infusions:   sodium chloride 100 mL/hr at 09/21/19 0004    propofol Stopped (09/14/19 1103)    dextrose      dexmedetomidine (PRECEDEX) IV infusion Stopped (09/18/19 0252)    fentaNYL (SUBLIMAZE) infusion Stopped (09/09/19 0800)     PRN Meds:.morphine, sodium chloride flush, sodium chloride flush, metoprolol, glucose, dextrose, glucagon (rDNA), dextrose, albuterol sulfate HFA, ipratropium, haloperidol lactate, potassium chloride, magnesium sulfate, magnesium hydroxide, ondansetron, acetaminophen    Cholecystostomy injection reviewed    Assessment:  Acute cholecystitis with possible cholangitis and choledocholithiasis, status-post percutaneous cholecystostomy   Status-post tracheostomy tube placement on 9/13/2019 for respiratory failure with ventilator dependence  Sepsis  Respiratory failure  Pancreatitis  Encephalopathy  Electrolyte derangement       Plan:  1. No indication for surgery at this time, abdomen remains stable, drain output remains clear/bilious without signs of infection  2. Cholecystomy tube care/monitoring  3. Increase TF to 50 mL/hour as tolerated; monitor and correct electrolytes  4. Antibiotics per ID  5.  Activity as

## 2019-09-21 NOTE — DISCHARGE SUMMARY
108 (90 BASE) MCG/ACT inhaler Inhale 2 puffs into the lungs every 6 hours as needed. topiramate (TOPAMAX) 50 MG tablet Take 50 mg by mouth 2 times daily. Current Discharge Medication List      STOP taking these medications       oxyCODONE (OXY-IR) 15 MG immediate release tablet Comments:   Reason for Stopping:         oxyCODONE (OXY-IR) 15 MG immediate release tablet Comments:   Reason for Stopping:               Discharge ROS:  A complete review of systems was asked and negative for    Discharge Exam:    BP (!) 154/92   Pulse 107   Temp 98.3 °F (36.8 °C) (Temporal)   Resp 22   Ht 5' 2\" (1.575 m)   Wt 126 lb 1.7 oz (57.2 kg)   SpO2 100%   BMI 23.06 kg/m²   General appearance: Intubated, NAD  HEENT:   Normal cephalic, atraumatic, moist mucous membranes, no oropharyngeal erythema or exudate  Neck: Supple, tracheostomy intact no anterior cervical or SC LAD  Heart[de-identified] Normal S1/S2, no S3 or S4 RRR, no murmurs or rubs. Lungs:  Clear to auscultation bilaterally, No wheeze, rales or rhonchi noted. Abdomen: Soft, non-tender, non-distended,no organomegaly noted,  bowel sounds present, no masses  Musculoskeletal: Grossly intact,muscle strength equal and moves all four extremities 5/5 strength  Extremities: No clubbing, no cyanosis,no peripheral edema noted  Skin: No lesion or masses  Neurologic:  Neurovascularly intact without any focal sensory/motor deficits. Cranial nerves: II-XII intact, grossly non-focal.  Psychiatric:  Not oriented to date, Not oriented to person and Not oriented to place, intubated with tracheostomy        Labs:  For convenience and continuity at follow-up the following most recent labs are provided:    Lab Results   Component Value Date    WBC 8.1 09/21/2019    HGB 7.2 09/21/2019    HCT 22.1 09/21/2019    MCV 87.9 09/21/2019     09/21/2019     09/21/2019    K 3.9 09/21/2019     09/21/2019    CO2 22 09/21/2019    BUN 15 09/21/2019    CREATININE 0.6 09/21/2019 upper extremity PICC line extends to the expected location of superior vena cava. Bilateral edema or pneumonia, similar to prior, with small bilateral pleural effusions. Support apparatus as above. Xr Chest Portable    Result Date: 9/2/2019  EXAMINATION: ONE XRAY VIEW OF THE CHEST 9/2/2019 5:27 am COMPARISON: Prior studies most recent 9/1/2019 HISTORY: ORDERING SYSTEM PROVIDED HISTORY: intubated TECHNOLOGIST PROVIDED HISTORY: Reason for exam:->intubated FINDINGS: Expiratory AP portable view of the chest has been obtained. Endotracheal tube remains in place with distal tip located approximately 3 cm above the julian. Nasogastric tube remains in place with distal tip located within the distal aspect of the stomach. Percutaneous cholecystostomy tube again identified in the right upper quadrant. Cardiopericardial silhouette is enlarged but unchanged. There is fullness/ill definition of pulmonary vascularity. There is increasing parenchymal disease at both lung bases. Density in the region of the cardiophrenic angles raises possibility of underlying pleural effusions. Findings suggestive of worsening pleural-parenchymal disease when compared to the study of 9/1/2019 as described above. Xr Chest Portable    Result Date: 8/31/2019  EXAMINATION: ONE XRAY VIEW OF THE CHEST 8/31/2019 5:17 am COMPARISON: 08/30/2019 HISTORY: ORDERING SYSTEM PROVIDED HISTORY: Vent TECHNOLOGIST PROVIDED HISTORY: Reason for exam:->Vent FINDINGS: Lung volumes are low accentuating heart size and bronchovascular markings at the lung bases. Tip of ET tube is unchanged. NG tube has been advanced. Tip is now seen in the stomach Scattered opacities are seen throughout the lungs bilaterally, right greater than left, slightly increased in the left lower lobe.      Bilateral airspace disease, increased in the left retrocardiac region     Xr Chest Portable    Result Date: 8/30/2019  EXAMINATION: ONE XRAY VIEW OF THE CHEST 8/30/2019 5:10 pm COMPARISON: None. HISTORY: ORDERING SYSTEM PROVIDED HISTORY: et placement TECHNOLOGIST PROVIDED HISTORY: Reason for exam:->et placement Reason for Exam: et placement and ng placement Acuity: Acute Type of Exam: Initial FINDINGS: The tip of the endotracheal tube is in the expected position, superimposed approximately 3 cm above the julian. The nasogastric tube tip and side port are superimposed over the distal esophagus. That tube should be advanced approximately 12 additional cm prior to use. The pulmonary vasculature is congested, with patchy airspace disease bilaterally. No pneumothorax is found. No free air is seen. No acute bony abnormality. Endotracheal tube is in the expected position. Nasogastric tube appears to be within the distal esophagus. That should be advanced approximately 12 additional cm. Location of this was discussed with Dr. Geneva Villalpando at 8:20 p.m. on 08/30/2019. Pulmonary vascular congestion with patchy bilateral airspace disease, concerning for pulmonary edema. Xr Chest Portable    Result Date: 8/30/2019  EXAMINATION: ONE XRAY VIEW OF THE CHEST 8/30/2019 6:30 pm COMPARISON: August 26, 2015 HISTORY: ORDERING SYSTEM PROVIDED HISTORY: pain or SOB TECHNOLOGIST PROVIDED HISTORY: Reason for exam:->pain or SOB Reason for Exam: sob Acuity: Unknown Type of Exam: Unknown FINDINGS: The cardiomediastinal silhouette is mildly enlarged. There are patchy airspace opacities bilaterally, may be related to pulmonary edema versus pneumonia. There is mild left pleural effusion. There is no pneumothorax. There is no acute osseous abnormality. Mild cardiomegaly. Patchy airspace opacities bilaterally, may be related to pulmonary edema versus pneumonia. Mild left pleural effusion. Xr Chest 1 Vw    Result Date: 9/2/2019  EXAMINATION: ONE XRAY VIEW OF THE CHEST 9/1/2019 7:45 am COMPARISON: August 31, 2019.  HISTORY: ORDERING SYSTEM PROVIDED HISTORY: intubated TECHNOLOGIST PROVIDED HISTORY: Reason for exam:->intubated Reason for Exam: Intubated Acuity: Unknown Type of Exam: Unknown FINDINGS: Tip of the endotracheal tube is above the julian. Nasogastric tube tip projects over distal stomach. Right-sided catheter is noted within right mid abdomen. Cardiac and mediastinal contours enlarged but unchanged. Unchanged perihilar markings with prominence of interstitial lung markings. Trace bilateral pleural effusions. No evidence of pneumothorax. No evidence of new osseous abnormalities. 1. Tip of the endotracheal tube is above the julian. Additional support hardware, as detailed above. 2. Enlarged cardiomediastinal silhouette with findings suggestive of pulmonary interstitial edema. Correlation with volume status is recommended. Vl Cindy Bilateral Limited 1-2 Levels    Result Date: 9/18/2019  Lower Extremities Arterial Plethysmography  Demographics   Patient Name       Lisbeth Abarca   Date of Study      09/18/2019        Gender              Female   Patient Number     [id number]        Date of Birth       1940   Visit Number       [id number]         Age                 67 year(s)   Accession Number   [id number]         Room Number         2717   Corporate ID       [id number]          Sonographer         Lazarus Kyle, Jeppie Barrack RVT,                                                           RDMS, AB, OB/GYN   Ordering Physician Kasey Martínez  Interpreting        [facility]                                       Physician           Christell Leventhal MD,                                                           [facility]  Procedure Type of Study:   Extremities Arteries:Lower Arterial Plethysmography, VL ANKLE / BRACHIAL  INDICES EXTREMITY COMPLETE. Vascular Sonographer Report  Additional Indications:Pt had an acute onset of cyanotic toes, but when I got to her room, the nurse had put warm blankets on her legs and feet and she no longer had blue toes.  Impressions Right Impression Limited study due to pt's constant moving and inability to follow commands. Right CARLA: 1.17. This is consistent with no significant PAD at rest. Right first toe/brachial index 0.72: This is within normal range. Right pulse volume recordings are normal . Continuous wave Doppler of the right PTA, DPA is triphasic. Unable to obtain PPG's due to constant movement. Left Impression Limited study due to pt's constant moving and inability to follow commands. Left CALRA: 1.17. This is consistent with no significant PAD at rest. Left first toe/brachial index 0.74: This is within normal range . Left pulse volume recordings are normal . Continuous wave Doppler of the left DPA is triphasic. Unable to obtain PPG's due to constant movement. Conclusions   Summary   No evidence of significant arterial insufficiency at rest in the bilateral  lower extremities. Signature   ------------------------------------------------------------------  Electronically signed by Regina Melissa MD, [facility] (Interpreting  physician) on 09/18/2019 at 05:40 PM  ------------------------------------------------------------------  Patient Status:STAT. Study Location:Portable. Velocities are measured in cm/s ; Diameters are measured in mm Pressures +---------++--------+-----+----+--------+-----+ ! ! !Right   ! ! Left!        !     ! +---------++--------+-----+----+--------+-----+ ! Location ! !Pressure! Ratio! !Pressure! Ratio! +---------++--------+-----+----+--------+-----+ ! DP       !!154     !1.11 !    !163     !1.17 ! +---------++--------+-----+----+--------+-----+ ! Ankle PT !!163     !1.17 !    !        !     ! +---------++--------+-----+----+--------+-----+ ! Great Toe!!100     !0.72 !    !103     ! 0.74 ! +---------++--------+-----+----+--------+-----+   - Brachial Pressure:Left:139.   - CARLA:Right: 1.17. Left: 1.17. Right Plethysmographic Results   - Right CARLA:1.17. Left Plethysmographic Results   - Left Brachial Pressure:139. - Left CARLA:1.17.  Plethysmographic Digit Evaluation +---------++--------+-----+-------------++--------+-----+-------------+ ! ! !Right   ! ! Left         !!        !     !             ! +---------++--------+-----+-------------++--------+-----+-------------+ ! Location ! !Pressure! Ratio! PPG Wave Form! !Pressure! Ratio! PPG Wave Form! +---------++--------+-----+-------------++--------+-----+-------------+ ! Great Toe!!100     !0.72 !             !!103     !0.74 !             ! +---------++--------+-----+-------------++--------+-----+-------------+    Ir Lumbar Puncture For Diagnosis    Result Date: 9/19/2019  EXAMINATION: FLUOROSCOPIC GUIDED LUMBAR PUNCTURE 9/19/2019 12:23 pm HISTORY: ORDERING SYSTEM PROVIDED HISTORY: persistent encephalopathy TECHNOLOGIST PROVIDED HISTORY: Reason for exam:->persistent encephalopathy FLUOROSCOPY DOSE AND TYPE OR TIME AND EXPOSURES: 0.4 minutes, 2 images, 5.8 mGy PROCEDURE: :  Yudelka Morse MD Informed consent was obtained after the risks and benefits of the procedure were discussed with the patient and all questions were answered fully. Mather protocol was observed and a standard timeout was performed. The patient was positioned prone and the back was prepped and draped in the normal sterile fashion. 1% lidocaine was used for local anesthesia. The subarachnoid space was accessed with a 22-gauge 3.5\" spinal needle at the L2/L3 level. Free flow of clear CSF was noted. Approximately 14 ml of CSF was removed and sent for analysis. The stylet was reinserted, spinal needle was removed and brief pressure was applied at the puncture site. There were no immediate complications and the patient tolerated the procedure well. Successful fluoroscopic-guided lumbar puncture.      Ct Chest Abdomen Pelvis W Contrast    Result Date: 9/6/2019  EXAMINATION: CT OF THE CHEST, ABDOMEN, AND PELVIS WITH CONTRAST 9/6/2019 4:45 pm TECHNIQUE: CT of the chest, abdomen and pelvis was performed with the administration of intravenous

## 2019-09-27 NOTE — TELEPHONE ENCOUNTER
Please call Radha Gómez back. Drain study has been done before transfer and the bile duct is open so the gallbladder tube can be clamped off or pulled out anytime at this point. Would not transport pt in that state to the office. No surgery planned for her unless she gets off vent and better.

## 2019-10-19 PROBLEM — Z93.0 TRACHEOSTOMY PRESENT (HCC): Status: ACTIVE | Noted: 2019-01-01

## 2019-10-19 PROBLEM — I48.91 ATRIAL FIBRILLATION WITH RVR (HCC): Status: ACTIVE | Noted: 2019-01-01

## 2019-10-28 PROBLEM — I48.91 ATRIAL FIBRILLATION WITH RAPID VENTRICULAR RESPONSE (HCC): Status: RESOLVED | Noted: 2018-09-06 | Resolved: 2019-01-01

## 2020-01-22 ENCOUNTER — CARE COORDINATION (OUTPATIENT)
Dept: CASE MANAGEMENT | Age: 80
End: 2020-01-22

## 2024-06-04 NOTE — PROGRESS NOTES
P Pulmonary and Critical Care    Follow Up Note    Subjective:   CHIEF COMPLAINT / HPI:   Chief Complaint   Patient presents with    Abdominal Pain     pt arrived via EMS.  abdominal pain started yesterday. n/v. Trach and PEG are in place. Mental status is about the same. Afebrile times the last 24 hours. LP was negative. Tolerating tube feed at 30 cc/h. Past Medical History:    Reviewed; no changes    Social History:    Reviewed; no changes    REVIEW OF SYSTEMS:    CONSTITUTIONAL:  negative for fevers and chills  RESPIRATORY:  See HPI  CARDIOVASCULAR:  negative for chest pain, palpitations, edema  GASTROINTESTINAL:  negative for nausea, vomiting, diarrhea, constipation and abdominal pain    Objective:   PHYSICAL EXAM:        VITALS:  /75   Pulse 121   Temp 98.3 °F (36.8 °C) (Temporal)   Resp (!) 33   Ht 5' 2\" (1.575 m)   Wt 125 lb 14.1 oz (57.1 kg)   SpO2 100%   BMI 23.02 kg/m²  on tracheostomy and mechanical ventilation. 24HR INTAKE/OUTPUT:      Intake/Output Summary (Last 24 hours) at 9/20/2019 0940  Last data filed at 9/20/2019 0500  Gross per 24 hour   Intake 2549 ml   Output 2895 ml   Net -346 ml       CONSTITUTIONAL: Arousable,,  no apparent distress, and appears stated age  LUNGS:  No increased work of breathing and clear to auscultation, no crackles or wheezes  CARDIOVASCULAR: S1 and S2 and no JVD  ABDOMEN:  normal bowel sounds, non-distended and non-tender to palpation  EXT: No edema, no calf tenderness. Pulses are present bilaterally. NEUROLOGIC: She is more alert. She did not really follow commands for me but has indicated to the nurse that she is having pain at times.   SKIN:  normal skin color, texture, turgor, no redness, warmth, or swelling at IV sites    DATA:    CBC:  Recent Labs     09/18/19  0545 09/19/19  0505 09/20/19  0455   WBC 11.8* 10.8 9.3   RBC 3.14* 2.79* 2.74*   HGB 8.8* 8.1* 8.0*   HCT 27.4* 24.5* 24.1*   * 403 370   MCV 87.2 87.7 87.9   MCH 27.9 No

## (undated) DEVICE — Device

## (undated) DEVICE — GEL US 20GM NONIRRITATING OVERWRAPPED FILE PCH TRNSMIT

## (undated) DEVICE — ELECTRODE PT RET AD L9FT HI MOIST COND ADH HYDRGEL CORDED

## (undated) DEVICE — TUBE PERC ENDO GASTSTMY 20FR

## (undated) DEVICE — CHLORAPREP 26ML ORANGE

## (undated) DEVICE — SEALER LAP SM L18.8CM OPN JAW HAND/FOOT SWCH FORCETRIAD

## (undated) DEVICE — KIT OR ROOM TURNOVER W/STRAP

## (undated) DEVICE — AMD ANTIMICROBIAL DRAIN SPONGES, 6 PLY, 0.2% POLYHEXAMETHYLENE BIGUANIDE HCI (PHMB): Brand: EXCILON

## (undated) DEVICE — SOLUTION IV IRRIG POUR BRL 0.9% SODIUM CHL 2F7124

## (undated) DEVICE — Device: Brand: FABCO

## (undated) DEVICE — BLADE ES ELASTOMERIC COAT INSUL DURABLE BEND UPTO 90DEG

## (undated) DEVICE — RESTRAINT EXT ANK WRST LT BLU FOAM 2 STRP SIDE BCKL HK AND

## (undated) DEVICE — TUBE VENT DEAD SPACE 30ML OD15MM ID15MM FLX SMOOTH FLO

## (undated) DEVICE — INTENDED FOR TISSUE SEPARATION, AND OTHER PROCEDURES THAT REQUIRE A SHARP SURGICAL BLADE TO PUNCTURE OR CUT.: Brand: BARD-PARKER ® STAINLESS STEEL BLADES

## (undated) DEVICE — SUTURE PROL SZ 2-0 L36IN NONABSORBABLE BLU SH L26MM 1/2 CIR 8523H

## (undated) DEVICE — Z DISCONTINUED BY MEDLINE USE 2280062 TUBE TRACH SZ 8 L79MM OD12.2MM ID7.6MM CUF DISP INNR CANN

## (undated) DEVICE — SOLUTION IV IRRIG WATER 500ML POUR BRL ST 2F7113

## (undated) DEVICE — SET VLV 3 PC AWS DISPOSABLE GRDIAN SCOPEVALET

## (undated) DEVICE — SPONGE SURG WHT KTNR DISECT RADPQ ST

## (undated) DEVICE — PROCEDURE KIT ENDOSCP CUST

## (undated) DEVICE — TRAY,CATHETER,SUCTION,14 FR,2 GLV,MINI: Brand: MEDLINE

## (undated) DEVICE — SYRINGE, LUER LOCK, 10ML: Brand: MEDLINE

## (undated) DEVICE — SUTURE VCRL SZ 3-0 L18IN ABSRB UD W/O NDL POLYGLACTIN 910 J110T

## (undated) DEVICE — MOUTHPIECE ENDOSCP L CTRL OPN AND SIDE PORTS DISP

## (undated) DEVICE — BW-412T DISP COMBO CLEANING BRUSH: Brand: SINGLE USE COMBINATION CLEANING BRUSH

## (undated) DEVICE — GLOVE SURG SZ 6.5 L11.2IN FNGR THK9.8MIL STRW LTX POLYMER

## (undated) DEVICE — SUTURE VCRL SZ 2-0 L18IN ABSRB UD POLYGLACTIN 910 BRAID TIE J111T

## (undated) DEVICE — SHEET,DRAPE,40X58,STERILE: Brand: MEDLINE

## (undated) DEVICE — DRAPE,T,LAPARO,TRANS,STERILE: Brand: MEDLINE

## (undated) DEVICE — 3M™ TEGADERM™ TRANSPARENT FILM DRESSING FRAME STYLE, 1626W, 4 IN X 4-3/4 IN (10 CM X 12 CM), 50/CT 4CT/CASE: Brand: 3M™ TEGADERM™

## (undated) DEVICE — GOWN SIRUS NONREIN LG W/TWL: Brand: MEDLINE INDUSTRIES, INC.